# Patient Record
Sex: FEMALE | Race: BLACK OR AFRICAN AMERICAN | Employment: FULL TIME | URBAN - NONMETROPOLITAN AREA
[De-identification: names, ages, dates, MRNs, and addresses within clinical notes are randomized per-mention and may not be internally consistent; named-entity substitution may affect disease eponyms.]

---

## 2017-09-17 ENCOUNTER — HOSPITAL ENCOUNTER (EMERGENCY)
Age: 47
Discharge: HOME | End: 2017-09-17
Attending: EMERGENCY MEDICINE
Payer: MEDICARE

## 2017-09-17 VITALS
TEMPERATURE: 98.8 F | RESPIRATION RATE: 18 BRPM | BODY MASS INDEX: 25.9 KG/M2 | OXYGEN SATURATION: 99 % | HEIGHT: 67 IN | HEART RATE: 101 BPM | DIASTOLIC BLOOD PRESSURE: 93 MMHG | WEIGHT: 165 LBS | SYSTOLIC BLOOD PRESSURE: 110 MMHG

## 2017-09-17 DIAGNOSIS — M54.2 NECK PAIN: ICD-10-CM

## 2017-09-17 DIAGNOSIS — M54.32 SCIATICA OF LEFT SIDE: Primary | ICD-10-CM

## 2017-09-17 DIAGNOSIS — J01.01 ACUTE RECURRENT MAXILLARY SINUSITIS: ICD-10-CM

## 2017-09-17 PROCEDURE — 99283 EMERGENCY DEPT VISIT LOW MDM: CPT

## 2017-09-17 PROCEDURE — 96372 THER/PROPH/DIAG INJ SC/IM: CPT

## 2017-09-17 PROCEDURE — 6360000002 HC RX W HCPCS: Performed by: EMERGENCY MEDICINE

## 2017-09-17 PROCEDURE — 6370000000 HC RX 637 (ALT 250 FOR IP): Performed by: EMERGENCY MEDICINE

## 2017-09-17 RX ORDER — ORPHENADRINE CITRATE 30 MG/ML
60 INJECTION INTRAMUSCULAR; INTRAVENOUS ONCE
Status: COMPLETED | OUTPATIENT
Start: 2017-09-17 | End: 2017-09-17

## 2017-09-17 RX ORDER — PREDNISONE 50 MG/1
50 TABLET ORAL DAILY
Qty: 4 TABLET | Refills: 0 | Status: SHIPPED | OUTPATIENT
Start: 2017-09-17 | End: 2017-09-21

## 2017-09-17 RX ORDER — AMOXICILLIN AND CLAVULANATE POTASSIUM 875; 125 MG/1; MG/1
1 TABLET, FILM COATED ORAL 2 TIMES DAILY
Qty: 20 TABLET | Refills: 0 | Status: SHIPPED | OUTPATIENT
Start: 2017-09-17 | End: 2017-09-27

## 2017-09-17 RX ORDER — OXYCODONE HYDROCHLORIDE AND ACETAMINOPHEN 5; 325 MG/1; MG/1
1 TABLET ORAL ONCE
Status: COMPLETED | OUTPATIENT
Start: 2017-09-17 | End: 2017-09-17

## 2017-09-17 RX ORDER — CYCLOBENZAPRINE HCL 10 MG
10 TABLET ORAL 3 TIMES DAILY PRN
Qty: 30 TABLET | Refills: 0 | Status: SHIPPED | OUTPATIENT
Start: 2017-09-17 | End: 2017-10-07

## 2017-09-17 RX ORDER — OXYCODONE HYDROCHLORIDE AND ACETAMINOPHEN 5; 325 MG/1; MG/1
1 TABLET ORAL EVERY 6 HOURS PRN
Qty: 12 TABLET | Refills: 0 | Status: SHIPPED | OUTPATIENT
Start: 2017-09-17 | End: 2017-09-29

## 2017-09-17 RX ADMIN — ORPHENADRINE CITRATE 60 MG: 30 INJECTION INTRAMUSCULAR; INTRAVENOUS at 13:11

## 2017-09-17 RX ADMIN — PREDNISONE 50 MG: 20 TABLET ORAL at 13:08

## 2017-09-17 RX ADMIN — OXYCODONE HYDROCHLORIDE AND ACETAMINOPHEN 1 TABLET: 5; 325 TABLET ORAL at 13:07

## 2017-09-17 ASSESSMENT — PAIN SCALES - GENERAL
PAINLEVEL_OUTOF10: 6
PAINLEVEL_OUTOF10: 6

## 2017-09-17 ASSESSMENT — PAIN DESCRIPTION - ORIENTATION: ORIENTATION: LEFT

## 2017-09-17 ASSESSMENT — PAIN DESCRIPTION - PAIN TYPE: TYPE: ACUTE PAIN

## 2017-09-17 ASSESSMENT — PAIN DESCRIPTION - FREQUENCY: FREQUENCY: CONTINUOUS

## 2017-10-02 ENCOUNTER — HOSPITAL ENCOUNTER (OUTPATIENT)
Age: 47
Discharge: HOME | End: 2017-10-02
Payer: MEDICARE

## 2017-10-02 LAB
ALBUMIN SERPL-MCNC: 3.9 G/DL (ref 3.5–5.1)
ALP BLD-CCNC: 43 U/L (ref 38–126)
ALT SERPL-CCNC: 36 U/L (ref 11–66)
ANION GAP SERPL CALCULATED.3IONS-SCNC: 12 MEQ/L (ref 8–16)
ANISOCYTOSIS: ABNORMAL
AST SERPL-CCNC: 24 U/L (ref 5–40)
BASOPHILS # BLD: 1.1 %
BASOPHILS ABSOLUTE: 0 THOU/MM3 (ref 0–0.1)
BILIRUB SERPL-MCNC: 0.7 MG/DL (ref 0.3–1.2)
BUN BLDV-MCNC: 12 MG/DL (ref 7–22)
CALCIUM SERPL-MCNC: 9 MG/DL (ref 8.5–10.5)
CHLORIDE BLD-SCNC: 100 MEQ/L (ref 98–111)
CHOLESTEROL, TOTAL: 180 MG/DL (ref 100–199)
CO2: 27 MEQ/L (ref 23–33)
CREAT SERPL-MCNC: 1.1 MG/DL (ref 0.4–1.2)
EOSINOPHIL # BLD: 2 %
EOSINOPHILS ABSOLUTE: 0.1 THOU/MM3 (ref 0–0.4)
GFR SERPL CREATININE-BSD FRML MDRD: 65 ML/MIN/1.73M2
GLUCOSE BLD-MCNC: 92 MG/DL (ref 70–108)
HCT VFR BLD CALC: 38 % (ref 37–47)
HDLC SERPL-MCNC: 66 MG/DL
HEMOGLOBIN: 12.4 GM/DL (ref 12–16)
LDL CHOLESTEROL CALCULATED: 98 MG/DL
LYMPHOCYTES # BLD: 23.4 %
LYMPHOCYTES ABSOLUTE: 1 THOU/MM3 (ref 1–4.8)
MCH RBC QN AUTO: 28.3 PG (ref 27–31)
MCHC RBC AUTO-ENTMCNC: 32.7 GM/DL (ref 33–37)
MCV RBC AUTO: 86.7 FL (ref 81–99)
MONOCYTES # BLD: 11.3 %
MONOCYTES ABSOLUTE: 0.5 THOU/MM3 (ref 0.4–1.3)
NUCLEATED RED BLOOD CELLS: 0 /100 WBC
PDW BLD-RTO: 16.5 % (ref 11.5–14.5)
PLATELET # BLD: 129 THOU/MM3 (ref 130–400)
PMV BLD AUTO: 9.9 MCM (ref 7.4–10.4)
POTASSIUM SERPL-SCNC: 4.8 MEQ/L (ref 3.5–5.2)
RBC # BLD: 4.38 MILL/MM3 (ref 4.2–5.4)
RBC # BLD: NORMAL 10*6/UL
SEG NEUTROPHILS: 62.2 %
SEGMENTED NEUTROPHILS ABSOLUTE COUNT: 2.7 THOU/MM3 (ref 1.8–7.7)
SODIUM BLD-SCNC: 139 MEQ/L (ref 135–145)
TOTAL PROTEIN: 6.8 G/DL (ref 6.1–8)
TRIGL SERPL-MCNC: 80 MG/DL (ref 0–199)
TSH SERPL DL<=0.05 MIU/L-ACNC: 2.3 UIU/ML (ref 0.4–4.2)
WBC # BLD: 4.3 THOU/MM3 (ref 4.8–10.8)

## 2017-10-02 PROCEDURE — 36415 COLL VENOUS BLD VENIPUNCTURE: CPT

## 2017-10-02 PROCEDURE — 80061 LIPID PANEL: CPT

## 2017-10-02 PROCEDURE — 80050 GENERAL HEALTH PANEL: CPT

## 2017-10-05 ENCOUNTER — HOSPITAL ENCOUNTER (OUTPATIENT)
Dept: WOMENS IMAGING | Age: 47
Discharge: HOME | End: 2017-10-05
Payer: MEDICARE

## 2017-10-05 DIAGNOSIS — Z12.31 VISIT FOR SCREENING MAMMOGRAM: ICD-10-CM

## 2017-10-05 PROCEDURE — G0202 SCR MAMMO BI INCL CAD: HCPCS

## 2017-10-07 ENCOUNTER — HOSPITAL ENCOUNTER (EMERGENCY)
Age: 47
Discharge: HOME | End: 2017-10-07
Attending: EMERGENCY MEDICINE
Payer: MEDICARE

## 2017-10-07 ENCOUNTER — APPOINTMENT (OUTPATIENT)
Dept: CT IMAGING | Age: 47
End: 2017-10-07
Payer: MEDICARE

## 2017-10-07 ENCOUNTER — APPOINTMENT (OUTPATIENT)
Dept: INTERVENTIONAL RADIOLOGY/VASCULAR | Age: 47
End: 2017-10-07
Payer: MEDICARE

## 2017-10-07 ENCOUNTER — APPOINTMENT (OUTPATIENT)
Dept: GENERAL RADIOLOGY | Age: 47
End: 2017-10-07
Payer: MEDICARE

## 2017-10-07 VITALS
SYSTOLIC BLOOD PRESSURE: 178 MMHG | HEIGHT: 67 IN | TEMPERATURE: 98.4 F | BODY MASS INDEX: 30.29 KG/M2 | OXYGEN SATURATION: 100 % | RESPIRATION RATE: 17 BRPM | DIASTOLIC BLOOD PRESSURE: 112 MMHG | HEART RATE: 76 BPM | WEIGHT: 193 LBS

## 2017-10-07 DIAGNOSIS — I10 UNCONTROLLED HYPERTENSION: ICD-10-CM

## 2017-10-07 DIAGNOSIS — R07.89 ATYPICAL CHEST PAIN: Primary | ICD-10-CM

## 2017-10-07 DIAGNOSIS — I71.21 ASCENDING AORTIC ANEURYSM: ICD-10-CM

## 2017-10-07 LAB
ANION GAP SERPL CALCULATED.3IONS-SCNC: 11 MEQ/L (ref 8–16)
ANISOCYTOSIS: ABNORMAL
BACTERIA: ABNORMAL /HPF
BASOPHILS # BLD: 1 %
BASOPHILS ABSOLUTE: 0 THOU/MM3 (ref 0–0.1)
BILIRUBIN URINE: NEGATIVE
BLOOD, URINE: NEGATIVE
BUN BLDV-MCNC: 19 MG/DL (ref 7–22)
CALCIUM SERPL-MCNC: 8.9 MG/DL (ref 8.5–10.5)
CASTS 2: ABNORMAL /LPF
CASTS UA: ABNORMAL /LPF
CHARACTER, URINE: CLEAR
CHLORIDE BLD-SCNC: 100 MEQ/L (ref 98–111)
CO2: 26 MEQ/L (ref 23–33)
COLOR: YELLOW
CREAT SERPL-MCNC: 1.3 MG/DL (ref 0.4–1.2)
CRYSTALS, UA: ABNORMAL
EKG ATRIAL RATE: 83 BPM
EKG P AXIS: 34 DEGREES
EKG P-R INTERVAL: 142 MS
EKG Q-T INTERVAL: 354 MS
EKG QRS DURATION: 78 MS
EKG QTC CALCULATION (BAZETT): 415 MS
EKG R AXIS: -71 DEGREES
EKG T AXIS: 74 DEGREES
EKG VENTRICULAR RATE: 83 BPM
EOSINOPHIL # BLD: 2.3 %
EOSINOPHILS ABSOLUTE: 0.1 THOU/MM3 (ref 0–0.4)
EPITHELIAL CELLS, UA: ABNORMAL /HPF
GFR SERPL CREATININE-BSD FRML MDRD: 53 ML/MIN/1.73M2
GLUCOSE BLD-MCNC: 80 MG/DL (ref 70–108)
GLUCOSE URINE: NEGATIVE MG/DL
HCT VFR BLD CALC: 38.3 % (ref 37–47)
HEMOGLOBIN: 12.5 GM/DL (ref 12–16)
KETONES, URINE: NEGATIVE
LEUKOCYTE ESTERASE, URINE: ABNORMAL
LYMPHOCYTES # BLD: 25.1 %
LYMPHOCYTES ABSOLUTE: 0.9 THOU/MM3 (ref 1–4.8)
MCH RBC QN AUTO: 28.2 PG (ref 27–31)
MCHC RBC AUTO-ENTMCNC: 32.7 GM/DL (ref 33–37)
MCV RBC AUTO: 86.2 FL (ref 81–99)
MISCELLANEOUS 2: ABNORMAL
MONOCYTES # BLD: 15.2 %
MONOCYTES ABSOLUTE: 0.5 THOU/MM3 (ref 0.4–1.3)
NITRITE, URINE: NEGATIVE
NUCLEATED RED BLOOD CELLS: 0 /100 WBC
OSMOLALITY CALCULATION: 275.1 MOSMOL/KG (ref 275–300)
PDW BLD-RTO: 16.6 % (ref 11.5–14.5)
PH UA: 7
PLATELET # BLD: 126 THOU/MM3 (ref 130–400)
PMV BLD AUTO: 10.5 MCM (ref 7.4–10.4)
POTASSIUM SERPL-SCNC: 4.6 MEQ/L (ref 3.5–5.2)
PROTEIN UA: NEGATIVE
RBC # BLD: 4.45 MILL/MM3 (ref 4.2–5.4)
RBC # BLD: NORMAL 10*6/UL
RBC URINE: ABNORMAL /HPF
RENAL EPITHELIAL, UA: ABNORMAL
SEG NEUTROPHILS: 56.4 %
SEGMENTED NEUTROPHILS ABSOLUTE COUNT: 1.9 THOU/MM3 (ref 1.8–7.7)
SODIUM BLD-SCNC: 137 MEQ/L (ref 135–145)
SPECIFIC GRAVITY, URINE: 1.03 (ref 1–1.03)
TROPONIN T: < 0.01 NG/ML
TROPONIN T: < 0.01 NG/ML
UROBILINOGEN, URINE: 0.2 EU/DL
WBC # BLD: 3.4 THOU/MM3 (ref 4.8–10.8)
WBC UA: ABNORMAL /HPF
YEAST: ABNORMAL

## 2017-10-07 PROCEDURE — 84484 ASSAY OF TROPONIN QUANT: CPT

## 2017-10-07 PROCEDURE — 93005 ELECTROCARDIOGRAM TRACING: CPT | Performed by: PHYSICIAN ASSISTANT

## 2017-10-07 PROCEDURE — 80048 BASIC METABOLIC PNL TOTAL CA: CPT

## 2017-10-07 PROCEDURE — 93971 EXTREMITY STUDY: CPT

## 2017-10-07 PROCEDURE — 71020 XR CHEST STANDARD TWO VW: CPT

## 2017-10-07 PROCEDURE — 85025 COMPLETE CBC W/AUTO DIFF WBC: CPT

## 2017-10-07 PROCEDURE — 81001 URINALYSIS AUTO W/SCOPE: CPT

## 2017-10-07 PROCEDURE — 71275 CT ANGIOGRAPHY CHEST: CPT

## 2017-10-07 PROCEDURE — 99285 EMERGENCY DEPT VISIT HI MDM: CPT

## 2017-10-07 PROCEDURE — 2580000003 HC RX 258: Performed by: EMERGENCY MEDICINE

## 2017-10-07 PROCEDURE — 87086 URINE CULTURE/COLONY COUNT: CPT

## 2017-10-07 PROCEDURE — 6360000004 HC RX CONTRAST MEDICATION: Performed by: EMERGENCY MEDICINE

## 2017-10-07 PROCEDURE — 36415 COLL VENOUS BLD VENIPUNCTURE: CPT

## 2017-10-07 RX ORDER — PAROXETINE 10 MG/1
20 TABLET, FILM COATED ORAL NIGHTLY
Status: ON HOLD | COMMUNITY
End: 2017-11-14 | Stop reason: DRUGHIGH

## 2017-10-07 RX ORDER — AMLODIPINE BESYLATE 2.5 MG/1
2.5 TABLET ORAL DAILY
COMMUNITY
End: 2017-10-07

## 2017-10-07 RX ORDER — SODIUM CHLORIDE 9 MG/ML
INJECTION, SOLUTION INTRAVENOUS CONTINUOUS
Status: DISCONTINUED | OUTPATIENT
Start: 2017-10-07 | End: 2017-10-07 | Stop reason: HOSPADM

## 2017-10-07 RX ORDER — AMLODIPINE BESYLATE 5 MG/1
5 TABLET ORAL DAILY
Qty: 30 TABLET | Refills: 0 | Status: ON HOLD | OUTPATIENT
Start: 2017-10-07 | End: 2017-11-14 | Stop reason: ALTCHOICE

## 2017-10-07 RX ADMIN — SODIUM CHLORIDE: 9 INJECTION, SOLUTION INTRAVENOUS at 19:30

## 2017-10-07 RX ADMIN — IOPAMIDOL 80 ML: 755 INJECTION, SOLUTION INTRAVENOUS at 16:53

## 2017-10-07 ASSESSMENT — ENCOUNTER SYMPTOMS
COUGH: 0
BACK PAIN: 0
VOMITING: 0
SORE THROAT: 0
WHEEZING: 0
RHINORRHEA: 0
ABDOMINAL PAIN: 0
DIARRHEA: 0
NAUSEA: 0
SHORTNESS OF BREATH: 1

## 2017-10-07 ASSESSMENT — PAIN DESCRIPTION - LOCATION: LOCATION: CHEST

## 2017-10-07 ASSESSMENT — PAIN DESCRIPTION - DESCRIPTORS: DESCRIPTORS: SQUEEZING

## 2017-10-07 ASSESSMENT — PAIN DESCRIPTION - FREQUENCY: FREQUENCY: CONTINUOUS

## 2017-10-07 ASSESSMENT — PAIN SCALES - GENERAL: PAINLEVEL_OUTOF10: 7

## 2017-10-07 ASSESSMENT — HEART SCORE: ECG: 0

## 2017-10-07 ASSESSMENT — PAIN DESCRIPTION - PAIN TYPE: TYPE: ACUTE PAIN

## 2017-10-07 NOTE — ED PROVIDER NOTES
[address] [address] [address]   eMERGENCY dEPARTMENT eNCOUnter        [address] [facility]    Chief Complaint   Patient presents with    Chest Pain    Shortness of Breath       Nurses Notes reviewed and I agree except as noted in the HPI. HPI    Soren Farrar is a 56 y.o. female who presents for evaluation of chest pain and shortness of breath. The patient states that earlier today she developed left sided chest pain and shortness of breath that began yesterday. She describes her chest pain as \"squeezing\" with occasional sharp pain that she currently rates as a 7/10. She states she is only able to walk approximately 10 feet before feeling short of breath. She states yesterday she also developed diaphoresis, fatigue, and decreased activity. She states for the past 3-4 nights she has been having increased frequency. She states she also has left calf tenderness. Additionally, she states she has a MediPort on her left upper chest pain that she had implanted 4 years ago that hasn't been removed since then. The use of Mediport for antibiotics as she had a bad case of mastoiditis. She states she smokes a pack of cigarettes a day. She denies having nausea, emesis, diarrhea, headache, fever, or any other symptoms. REVIEW OF SYSTEMS    Review of Systems   Constitutional: Positive for activity change (decrease), diaphoresis and fatigue. Negative for appetite change, chills and fever. HENT: Negative for congestion, ear pain, rhinorrhea and sore throat. Respiratory: Positive for shortness of breath. Negative for cough and wheezing. Cardiovascular: Positive for chest pain. Negative for palpitations and leg swelling. Gastrointestinal: Negative for abdominal pain, diarrhea, nausea and vomiting. Genitourinary: Positive for frequency. Negative for difficulty urinating, dysuria, hematuria, urgency, vaginal bleeding, vaginal discharge and vaginal pain.    Musculoskeletal: Positive for myalgias (left calf). Negative for arthralgias, back pain, joint swelling and neck pain. Skin: Negative for pallor and rash. Neurological: Negative for dizziness, syncope, weakness, light-headedness and headaches. Hematological: Negative for adenopathy. PAST MEDICAL HISTORY     has a past medical history of Eczema; Hypertension; and Thyroid disease. SURGICAL HISTORY     has a past surgical history that includes  section; salpingectomy; Tubal ligation; Tunneled venous port placement (Left); and Thyroidectomy, partial.    CURRENT MEDICATIONS    Previous Medications    AMLODIPINE (NORVASC) 2.5 MG TABLET    Take 2.5 mg by mouth daily    BUPROPION HCL (WELLBUTRIN PO)    Take 1 tablet by mouth 2 times daily    PAROXETINE (PAXIL) 10 MG TABLET    Take 20 mg by mouth nightly       ALLERGIES    is allergic to fish-derived products. FAMILY HISTORY    indicated that the status of her mother is unknown. She indicated that the status of her maternal grandfather is unknown. She indicated that the status of her paternal grandfather is unknown.    family history includes Cancer in her maternal grandfather and mother; Diabetes in her paternal grandfather; Heart Disease in her paternal grandfather. SOCIAL HISTORY     reports that she has been smoking Cigarettes. She has a 20.00 pack-year smoking history. She has never used smokeless tobacco. She reports that she drinks alcohol. She reports that she does not use drugs. PHYSICAL EXAM      INITIAL VITALS: BP (!) 178/112   Pulse 76   Temp 98.4 °F (36.9 °C) (Oral)   Resp 17   Ht 5' 7\" (1.702 m)   Wt 193 lb (87.5 kg)   LMP 2017   SpO2 100%   BMI 30.23 kg/m²  Estimated body mass index is 30.23 kg/m² as calculated from the following:    Height as of this encounter: 5' 7\" (1.702 m). Weight as of this encounter: 193 lb (87.5 kg). Physical Exam   Constitutional: She is oriented to person, place, and time.  She appears well-developed and

## 2017-10-07 NOTE — ED NOTES
Labs obtained with IV start. Sent to lab a this time.       [id number] [facility]  10/07/17 2167

## 2017-10-07 NOTE — ED TRIAGE NOTES
Patient to room 29 with c/o substernal chest pain radiating under left breast and shortness of breath that started yesterday. Lung sounds clear throughout. Respirations easy and unlabored at this time. Abdomin soft and non-tender. Bowel sounds active all quadrants. No edema noted. Skin warm and dry. Monitor applied. Assessment complete.

## 2017-10-09 LAB
ORGANISM: ABNORMAL
URINE CULTURE REFLEX: ABNORMAL

## 2017-10-10 ENCOUNTER — OFFICE VISIT (OUTPATIENT)
Dept: CARDIOLOGY CLINIC | Age: 47
End: 2017-10-10
Payer: MEDICARE

## 2017-10-10 VITALS
WEIGHT: 191.3 LBS | SYSTOLIC BLOOD PRESSURE: 132 MMHG | DIASTOLIC BLOOD PRESSURE: 82 MMHG | BODY MASS INDEX: 30.02 KG/M2 | HEART RATE: 88 BPM | HEIGHT: 67 IN

## 2017-10-10 DIAGNOSIS — Z72.0 TOBACCO ABUSE: ICD-10-CM

## 2017-10-10 DIAGNOSIS — R07.9 CHEST PAIN, UNSPECIFIED TYPE: Primary | ICD-10-CM

## 2017-10-10 DIAGNOSIS — I71.9 AORTIC ANEURYSM WITHOUT RUPTURE, UNSPECIFIED PORTION OF AORTA (HCC): ICD-10-CM

## 2017-10-10 PROCEDURE — 99203 OFFICE O/P NEW LOW 30 MIN: CPT | Performed by: INTERNAL MEDICINE

## 2017-10-10 RX ORDER — ATORVASTATIN CALCIUM 40 MG/1
20 TABLET, FILM COATED ORAL DAILY
Qty: 30 TABLET | Refills: 3 | Status: ON HOLD | OUTPATIENT
Start: 2017-10-10 | End: 2017-11-14 | Stop reason: DRUGHIGH

## 2017-10-10 RX ORDER — ISOSORBIDE MONONITRATE 30 MG/1
30 TABLET, EXTENDED RELEASE ORAL DAILY
Qty: 30 TABLET | Refills: 3 | Status: ON HOLD | OUTPATIENT
Start: 2017-10-10 | End: 2017-11-14 | Stop reason: HOSPADM

## 2017-10-10 RX ORDER — ASPIRIN 81 MG/1
81 TABLET ORAL DAILY
Qty: 30 TABLET | Refills: 3 | Status: SHIPPED | OUTPATIENT
Start: 2017-10-10 | End: 2018-03-03 | Stop reason: SDUPTHER

## 2017-10-10 ASSESSMENT — ENCOUNTER SYMPTOMS
BLOATING: 0
EYE PAIN: 0
DIARRHEA: 0
DOUBLE VISION: 0
HEMOPTYSIS: 0
COUGH: 0
ABDOMINAL PAIN: 0
SHORTNESS OF BREATH: 1
SPUTUM PRODUCTION: 0
HOARSE VOICE: 0
CHANGE IN BOWEL HABIT: 0
ORTHOPNEA: 0
BOWEL INCONTINENCE: 0
EYE DISCHARGE: 0
CONSTIPATION: 0
BACK PAIN: 0
BLURRED VISION: 0

## 2017-10-10 NOTE — PROGRESS NOTES
Positive for malaise/fatigue. Negative for decreased appetite, diaphoresis, fever and weakness. HENT: Negative for congestion, hearing loss and hoarse voice. Eyes: Negative for blurred vision, discharge, double vision and pain. Cardiovascular: Positive for chest pain and dyspnea on exertion. Negative for claudication, cyanosis, irregular heartbeat, leg swelling, near-syncope, orthopnea, palpitations, paroxysmal nocturnal dyspnea and syncope. Respiratory: Positive for shortness of breath. Negative for cough, hemoptysis and sputum production. Endocrine: Negative for cold intolerance, heat intolerance, polydipsia, polyphagia and polyuria. Musculoskeletal: Negative for arthritis, back pain, falls, gout, joint pain and joint swelling. Gastrointestinal: Negative for bloating, abdominal pain, anorexia, change in bowel habit, bowel incontinence, constipation and diarrhea. Genitourinary: Negative for bladder incontinence, dysuria, flank pain, frequency and hematuria. Neurological: Negative for difficulty with concentration, disturbances in coordination, focal weakness, headaches and numbness. Psychiatric/Behavioral: Negative for altered mental status and depression. Objective:     /82   Pulse 88   Ht 5' 7\" (1.702 m)   Wt 191 lb 4.8 oz (86.8 kg)   LMP 09/22/2017   BMI 29.96 kg/m²     Wt Readings from Last 3 Encounters:   10/10/17 191 lb 4.8 oz (86.8 kg)   10/07/17 193 lb (87.5 kg)   09/17/17 165 lb (74.8 kg)     BP Readings from Last 3 Encounters:   10/10/17 132/82   10/07/17 (!) 178/112   09/17/17 (!) 110/93       Physical Exam   Constitutional: She is oriented to person, place, and time. She appears well-developed and well-nourished. HENT:   Head: Normocephalic and atraumatic. Eyes: EOM are normal. Pupils are equal, round, and reactive to light. Neck: Normal range of motion. Neck supple. No JVD present.    Cardiovascular: Normal rate, regular rhythm, normal heart sounds and intact [phone]    Transthoracic Echocardiogram  2D, M-mode, Doppler, and Color Doppler    Name: Che Doe  : [id number]  MR #: [id number]  Study date: 15-Dec-2012  Account #: [de-identified]  Ht-Wt-BSA: 79 in- 197.6 lb- 2.01 mÂ²    Reading Physician:  Mario Ramachandran MD  Technologist:  Dwayne Monsivais, [facility]  Referring Physician:  Yoni Webster. RICHARD López  Escobar Pancake for study: eval LV function, dyspnea, HTN    HISTORY: PRIOR HISTORY: SOB, HTN, smoker  /PROCEDURE: The procedure was performed at the bedside. The procedure was  color Doppler. Systolic blood pressure was 117 mmHg, at the start of the study. Diastolic blood pressure was 71 mmHg, at the start of the study. Image quality  was adequate. LEFT VENTRICLE: Size was normal. Systolic function was normal. Ejection  fraction was estimated to be 60 %. There were no regional wall motion  abnormalities. Wall thickness was normal. DOPPLER: Left ventricular diastolic  function parameters were normal.    AORTIC VALVE: The valve was trileaflet. Leaflets exhibited normal thickness and  normal cuspal separation. DOPPLER: Transaortic velocity was within the normal  range. There was no evidence for stenosis. There was no regurgitation. AORTA: The root exhibited normal size. MITRAL VALVE: Valve structure was normal. There was normal leaflet separation. DOPPLER: The transmitral velocity was within the normal range. There was no  evidence for stenosis. There was mild regurgitation. LEFT ATRIUM: Size was normal.    RIGHT VENTRICLE: The size was normal. Systolic function was normal. Wall  thickness was normal.    PULMONIC VALVE: Not well visualized. DOPPLER: The transpulmonic velocity was  within the normal range. There was no regurgitation. PULMONARY ARTERY: The size was normal. DOPPLER: Systolic pressure was within  the normal range. TRICUSPID VALVE: The valve structure was normal. There was normal leaflet  separation.  DOPPLER: The transtricuspid velocity was within the normal range. There was no evidence for stenosis. There was mild regurgitation. RIGHT ATRIUM: Size was normal.    SYSTEMIC VEINS: IVC: The inferior vena cava was normal in size. PERICARDIUM: There was no pericardial effusion. The pericardium was normal in  appearance. SYSTEM MEASUREMENT TABLES    2D mode  LA Dimension (2D): 3.3 cm  FS (2D-Cubed): 31 %  FS (2D-Teich): 31 %  IVSd (2D): 1 cm  IVSd; Mean chosen (2D): 1 cm  LVIDd (2D): 3.6 cm  LVIDs (2D): 2.5 cm  LVOT Area (2D): 3.1 cm2  LVPWd (2D): 1 cm  RVIDd (2D): 3 cm    M mode  AoR Diam (MM): 2.9 cm  AV Cusp Sep (MM): 2 cm  MV D-E Exc: 1.7 cm  MV EF Walsh (MM): 9.1 cm/s    Unspecified Scan Mode  VTI; Antegrade Flow: 29.4 cm  Vmax; Antegrade Flow: 1.5 m/s  LVOT Diam: 2 cm  LVOT Vmax: 90.2 cm/s  MV Peak A William; Mean: 52.8 cm/s  MV Peak E William; Antegrade Flow: 82.9 cm/s  Vmax; Antegrade Flow: 82.4 cm/s  TV Peak A William: 52.3 cm/s  TV Peak E William; Antegrade Flow: 71.1 cm/s    SUMMARY:    Left ventricle:  Size was normal.  Systolic function was normal. Ejection fraction was estimated to be 60 %. There were no regional wall motion abnormalities. Mitral valve: There was mild regurgitation. Tricuspid valve: There was mild regurgitation. Prepared and signed by    Sharyle Him, MD  Signed 15-Dec-2012 11:34:16         Assessment/Plan      1. Chest pain, unspecified type     2. Tobacco abuse     3. Aortic aneurysm without rupture, unspecified portion of aorta (HCC)     Underwent evaluation in ED  CT-Chest shows Ascending aortic anuersym of 4.2  Will repeat another CT-Chest in 6 month to see the rate of growth  Patient has a port in the left chest which was apparently placed for IV antibotics for mastoiditis  She reports she neglected to follow up to have it removed.   She has another appointment with Dr. Bobbi Nelson to have it evaluated for removal.  Patient uses cocaine, last use was one month ago, discussed about quitting the habit  EKG shows inferior infarct and poor R wave progression. Will get echo and stress test to evaluate for ischemia  Recently added amlodipine   Will add aspirin, Imdur, statin  Spoke to her about smoking and the dangers of tobacco and the importance of trying to stop smoking. We talked about ways to try to stop such as just stopping \"cold turkey\" (which is usually hard to do and not very successful) nicotine replacement options like the patch and gum and the electronic cigarette. We also talked about Chantix. Total time spent in tobacco counseling was between 3 to 5 minutes. Return in about 4 weeks (around 11/7/2017) for Review testing.            Electronically signed by Delon Nina MD [facility]  10/10/2017 at 11:07 AM

## 2017-10-14 ENCOUNTER — HOSPITAL ENCOUNTER (EMERGENCY)
Age: 47
Discharge: HOME | End: 2017-10-15
Attending: EMERGENCY MEDICINE
Payer: MEDICARE

## 2017-10-14 ENCOUNTER — APPOINTMENT (OUTPATIENT)
Dept: CT IMAGING | Age: 47
End: 2017-10-14
Payer: MEDICARE

## 2017-10-14 DIAGNOSIS — G44.209 ACUTE NON INTRACTABLE TENSION-TYPE HEADACHE: Primary | ICD-10-CM

## 2017-10-14 LAB
ALBUMIN SERPL-MCNC: 4.2 G/DL (ref 3.5–5.1)
ALP BLD-CCNC: 43 U/L (ref 38–126)
ALT SERPL-CCNC: 10 U/L (ref 11–66)
ANION GAP SERPL CALCULATED.3IONS-SCNC: 14 MEQ/L (ref 8–16)
ANISOCYTOSIS: ABNORMAL
APTT: 27.3 SECONDS (ref 22–38)
AST SERPL-CCNC: 13 U/L (ref 5–40)
BASOPHILS # BLD: 1.1 %
BASOPHILS ABSOLUTE: 0 THOU/MM3 (ref 0–0.1)
BILIRUB SERPL-MCNC: 0.2 MG/DL (ref 0.3–1.2)
BUN BLDV-MCNC: 17 MG/DL (ref 7–22)
C-REACTIVE PROTEIN: 0.06 MG/DL (ref 0–1)
CALCIUM SERPL-MCNC: 10 MG/DL (ref 8.5–10.5)
CHLORIDE BLD-SCNC: 98 MEQ/L (ref 98–111)
CO2: 25 MEQ/L (ref 23–33)
CREAT SERPL-MCNC: 1.3 MG/DL (ref 0.4–1.2)
EOSINOPHIL # BLD: 2.2 %
EOSINOPHILS ABSOLUTE: 0.1 THOU/MM3 (ref 0–0.4)
GFR SERPL CREATININE-BSD FRML MDRD: 53 ML/MIN/1.73M2
GLUCOSE BLD-MCNC: 86 MG/DL (ref 70–108)
HCT VFR BLD CALC: 38.5 % (ref 37–47)
HEMOGLOBIN: 12.5 GM/DL (ref 12–16)
INR BLD: 0.88 (ref 0.85–1.13)
LYMPHOCYTES # BLD: 30.8 %
LYMPHOCYTES ABSOLUTE: 1.4 THOU/MM3 (ref 1–4.8)
MCH RBC QN AUTO: 28.2 PG (ref 27–31)
MCHC RBC AUTO-ENTMCNC: 32.5 GM/DL (ref 33–37)
MCV RBC AUTO: 86.8 FL (ref 81–99)
MONOCYTES # BLD: 12.8 %
MONOCYTES ABSOLUTE: 0.6 THOU/MM3 (ref 0.4–1.3)
NUCLEATED RED BLOOD CELLS: 0 /100 WBC
OSMOLALITY CALCULATION: 274.7 MOSMOL/KG (ref 275–300)
PDW BLD-RTO: 16.9 % (ref 11.5–14.5)
PLATELET # BLD: 152 THOU/MM3 (ref 130–400)
PMV BLD AUTO: 10.5 MCM (ref 7.4–10.4)
POTASSIUM SERPL-SCNC: 4.4 MEQ/L (ref 3.5–5.2)
PRO-BNP: 71.6 PG/ML (ref 0–450)
PROCALCITONIN: 0.04 NG/ML (ref 0.01–0.09)
PROLACTIN: 24.2 NG/ML
RBC # BLD: 4.43 MILL/MM3 (ref 4.2–5.4)
RBC # BLD: NORMAL 10*6/UL
SEDIMENTATION RATE, ERYTHROCYTE: 13 MM/HR (ref 0–20)
SEG NEUTROPHILS: 53.1 %
SEGMENTED NEUTROPHILS ABSOLUTE COUNT: 2.4 THOU/MM3 (ref 1.8–7.7)
SODIUM BLD-SCNC: 137 MEQ/L (ref 135–145)
TOTAL PROTEIN: 7.4 G/DL (ref 6.1–8)
TROPONIN T: < 0.01 NG/ML
WBC # BLD: 4.5 THOU/MM3 (ref 4.8–10.8)

## 2017-10-14 PROCEDURE — 96375 TX/PRO/DX INJ NEW DRUG ADDON: CPT

## 2017-10-14 PROCEDURE — 84146 ASSAY OF PROLACTIN: CPT

## 2017-10-14 PROCEDURE — 70450 CT HEAD/BRAIN W/O DYE: CPT

## 2017-10-14 PROCEDURE — 86140 C-REACTIVE PROTEIN: CPT

## 2017-10-14 PROCEDURE — 85651 RBC SED RATE NONAUTOMATED: CPT

## 2017-10-14 PROCEDURE — 85730 THROMBOPLASTIN TIME PARTIAL: CPT

## 2017-10-14 PROCEDURE — 36415 COLL VENOUS BLD VENIPUNCTURE: CPT

## 2017-10-14 PROCEDURE — 84145 PROCALCITONIN (PCT): CPT

## 2017-10-14 PROCEDURE — 84484 ASSAY OF TROPONIN QUANT: CPT

## 2017-10-14 PROCEDURE — 6360000002 HC RX W HCPCS: Performed by: EMERGENCY MEDICINE

## 2017-10-14 PROCEDURE — 2580000003 HC RX 258: Performed by: EMERGENCY MEDICINE

## 2017-10-14 PROCEDURE — 80053 COMPREHEN METABOLIC PANEL: CPT

## 2017-10-14 PROCEDURE — 85610 PROTHROMBIN TIME: CPT

## 2017-10-14 PROCEDURE — 99283 EMERGENCY DEPT VISIT LOW MDM: CPT

## 2017-10-14 PROCEDURE — 96361 HYDRATE IV INFUSION ADD-ON: CPT

## 2017-10-14 PROCEDURE — 83880 ASSAY OF NATRIURETIC PEPTIDE: CPT

## 2017-10-14 PROCEDURE — 96374 THER/PROPH/DIAG INJ IV PUSH: CPT

## 2017-10-14 PROCEDURE — 70486 CT MAXILLOFACIAL W/O DYE: CPT

## 2017-10-14 PROCEDURE — 85025 COMPLETE CBC W/AUTO DIFF WBC: CPT

## 2017-10-14 RX ORDER — DIPHENHYDRAMINE HYDROCHLORIDE 50 MG/ML
25 INJECTION INTRAMUSCULAR; INTRAVENOUS ONCE
Status: COMPLETED | OUTPATIENT
Start: 2017-10-14 | End: 2017-10-14

## 2017-10-14 RX ORDER — 0.9 % SODIUM CHLORIDE 0.9 %
500 INTRAVENOUS SOLUTION INTRAVENOUS ONCE
Status: COMPLETED | OUTPATIENT
Start: 2017-10-14 | End: 2017-10-15

## 2017-10-14 RX ADMIN — PROCHLORPERAZINE EDISYLATE 10 MG: 5 INJECTION INTRAMUSCULAR; INTRAVENOUS at 22:31

## 2017-10-14 RX ADMIN — SODIUM CHLORIDE 500 ML: 9 INJECTION, SOLUTION INTRAVENOUS at 22:34

## 2017-10-14 RX ADMIN — DIPHENHYDRAMINE HYDROCHLORIDE 25 MG: 50 INJECTION, SOLUTION INTRAMUSCULAR; INTRAVENOUS at 22:31

## 2017-10-14 ASSESSMENT — ENCOUNTER SYMPTOMS
BACK PAIN: 0
EYE DISCHARGE: 0
ABDOMINAL PAIN: 0
SORE THROAT: 0
DIARRHEA: 0
RHINORRHEA: 0
NAUSEA: 0
SHORTNESS OF BREATH: 0
COUGH: 0
VOMITING: 0
WHEEZING: 0
PHOTOPHOBIA: 0
EYE PAIN: 0

## 2017-10-14 ASSESSMENT — PAIN SCALES - GENERAL: PAINLEVEL_OUTOF10: 10

## 2017-10-14 ASSESSMENT — PAIN DESCRIPTION - DESCRIPTORS: DESCRIPTORS: ACHING

## 2017-10-14 ASSESSMENT — PAIN DESCRIPTION - LOCATION: LOCATION: HEAD

## 2017-10-14 ASSESSMENT — PAIN DESCRIPTION - PAIN TYPE: TYPE: ACUTE PAIN

## 2017-10-15 VITALS
WEIGHT: 195 LBS | HEART RATE: 71 BPM | SYSTOLIC BLOOD PRESSURE: 148 MMHG | OXYGEN SATURATION: 98 % | HEIGHT: 67 IN | BODY MASS INDEX: 30.61 KG/M2 | DIASTOLIC BLOOD PRESSURE: 100 MMHG | RESPIRATION RATE: 14 BRPM | TEMPERATURE: 97.5 F

## 2017-10-15 NOTE — ED NOTES
Upon first contact with pstient, patient resting in bed with eyes closed. Patient arrouses to voice. VS as noted. Patient updated on POC. Lights off for comfort. Patient updated on POC.  WIll continue to monitor     Dave Suggs RN  10/14/17 0693

## 2017-10-15 NOTE — ED PROVIDER NOTES
[facility]  eMERGENCY dEPARTMENT eNCOUnter          CHIEF COMPLAINT       Chief Complaint   Patient presents with    Headache     severe       Nurses Notes reviewed and I agree except as noted in the HPI. HISTORY OF PRESENT ILLNESS    Bridger Manzo is a 56 y.o. female who presents to the Emergency Department for the evaluation of a headache that has been ongoing for 2 weeks. Patient states that she was seen in the ED on 9/17/17 for her headache, and that she was prescribed Augmentin for Sinusitis at that time. She states that she completed the prescription, but that the headache persisted. She rates her pain at a 10/10 in severity, and states that the pain is continuous in duration. She states that the pain is sharp in quality, and that it radiates from her face into her left ear and neck. Patient states that she is also experiencing intermittent blurred vision. She denies that the has experienced any head injury or trauma, photophobia, nausea, or vomiting. She reports that she has a history of hypertension, anxiety, and mastoiditis which she was diagnosed with roughly 4 years ago by Dr. Gabriella Garcia. She states that she was diagnosed with an ascending aortic aneurysm within the ED on 10/7/17, and that she has a family history of aneurysm. Patient states that she has a history of smoking. Patient denies further complaints at initial encounter. REVIEW OF SYSTEMS     Review of Systems   Constitutional: Negative for appetite change, chills, fatigue and fever. HENT: Positive for ear pain (left-sided). Negative for congestion, rhinorrhea and sore throat. Eyes: Positive for visual disturbance (blurry vision). Negative for photophobia, pain and discharge. Respiratory: Negative for cough, shortness of breath and wheezing. Cardiovascular: Negative for chest pain, palpitations and leg swelling.    Gastrointestinal: Negative for abdominal pain, diarrhea, nausea and following:     Osmolality Calc 274.7 (*)     All other components within normal limits   C-REACTIVE PROTEIN   SEDIMENTATION RATE   PROLACTIN   PROCALCITONIN   TROPONIN   BRAIN NATRIURETIC PEPTIDE   PROTIME-INR   APTT   ANION GAP       EMERGENCY DEPARTMENT COURSE:   Vitals:    Vitals:    10/14/17 2203 10/14/17 2324 10/15/17 0010   BP: (!) 161/115 132/89 (!) 148/100   Pulse: 96 75 71   Resp: 22 16 14   Temp: 97.5 °F (36.4 °C)     TempSrc: Oral     SpO2: 100% 100% 98%   Weight: 195 lb (88.5 kg)     Height: 5' 7\" (1.702 m)         9:59 PM     Patient was seen and evaluated in a timely fashion. Normal saline boluses given. Patient is given the abortive treatment including Compazine 10 g IV and Benadryl 25 mg IV. 12:45 AM    Labs are reviewed which are reassuring. CT brain and CT face have no acute findings. Headache improved significantly on reevaluation. No suspicion patient has acute intracranial pathologies. Discharged with diclofenac prescribed. Given her history of thoracic aneurysm hypertension, she is not a candidate to take Triptans. CRITICAL CARE:   None    CONSULTS:  None    PROCEDURES:  None    FINAL IMPRESSION      1.  Acute non intractable tension-type headache          DISPOSITION/PLAN   Home    PATIENT REFERRED TO:  [address]OV[address] [phone]    In 1 week        DISCHARGE MEDICATIONS:  New Prescriptions    DICLOFENAC (VOLTAREN) 50 MG EC TABLET    Take 1 tablet by mouth 2 times daily as needed for Pain (Headache)       (Please note that portions of this note were completed with a voice recognition program.  Efforts were made to edit the dictations but occasionally words are mis-transcribed.)    Scribe:  Jono Sandoval, 10/14/17 12:56 AM Scribing for and in the presence of Chilo Garcia MD.     Signed by: Logan Spencer, 10/14/17 12:56 AM     Provider:  I personally performed the services described in the documentation, reviewed and edited the documentation which was dictated to the scribe in my presence, and it accurately records my words and actions.     MD Shyann Brooks MD  10/15/17 0137

## 2017-10-15 NOTE — ED NOTES
Patient resting in bed at this time with eyes closed. Even and unlabored respirations observed. VS as noted.  WIll continue to monitor     Durga Haddad RN  10/15/17 [id number]

## 2017-10-18 ENCOUNTER — OFFICE VISIT (OUTPATIENT)
Dept: SURGERY | Age: 47
End: 2017-10-18
Payer: MEDICARE

## 2017-10-18 VITALS
DIASTOLIC BLOOD PRESSURE: 94 MMHG | RESPIRATION RATE: 20 BRPM | OXYGEN SATURATION: 97 % | HEART RATE: 104 BPM | HEIGHT: 67 IN | TEMPERATURE: 97.4 F | WEIGHT: 200.3 LBS | BODY MASS INDEX: 31.44 KG/M2 | SYSTOLIC BLOOD PRESSURE: 148 MMHG

## 2017-10-18 DIAGNOSIS — Z95.828 PORT CATHETER IN PLACE: Primary | ICD-10-CM

## 2017-10-18 PROCEDURE — 99202 OFFICE O/P NEW SF 15 MIN: CPT | Performed by: SURGERY

## 2017-10-18 ASSESSMENT — ENCOUNTER SYMPTOMS
BLOOD IN STOOL: 0
COUGH: 0
SHORTNESS OF BREATH: 0
VOMITING: 0
EYE PAIN: 0
SINUS PRESSURE: 0
ANAL BLEEDING: 0
CONSTIPATION: 0
EYE DISCHARGE: 0
COLOR CHANGE: 0
EYE ITCHING: 0
STRIDOR: 0
ABDOMINAL DISTENTION: 0
ABDOMINAL PAIN: 0
CHOKING: 0
WHEEZING: 0
RECTAL PAIN: 0
RHINORRHEA: 0
TROUBLE SWALLOWING: 0
EYE REDNESS: 0
DIARRHEA: 0
VOICE CHANGE: 0
NAUSEA: 0
PHOTOPHOBIA: 0
BACK PAIN: 0
CHEST TIGHTNESS: 0
APNEA: 0
SORE THROAT: 0
FACIAL SWELLING: 0

## 2017-10-18 NOTE — PROGRESS NOTES
Subjective:      Patient ID: Erin Lawson is a 56 y.o. female. Chief Complaint   Patient presents with    Surgical Consult     New patient--seen in the ER 10/7/17-Mediport removal-orginally placed by Dr Juju Bazan Is a 78-year-old female who presents for initial evaluation of a left subclavian vein single-lumen port insertion about 4 years ago secondary to mastoiditis. Patient states they were not able to place a PIC line. She states she has not used the port since then. She recently was seen in the emergency department and was told that if the port was not in use she should have it removed. She denies having any more issues with mastoiditis. No arm swelling. No pain no neck swelling. No chest pain. Insertion site well-healed. No drainage. No keloid. No history of infection. She states she has not flushed it since she had to use it for years ago. Denies any abdominal pain. No unexplained weight loss. No fever, chills or sweats. She is hoping to have the port removed if at all possible. Review of Systems   Constitutional: Negative for activity change, appetite change, chills, diaphoresis, fatigue, fever and unexpected weight change. HENT: Negative for congestion, dental problem, drooling, ear discharge, ear pain, facial swelling, hearing loss, mouth sores, nosebleeds, postnasal drip, rhinorrhea, sinus pressure, sneezing, sore throat, tinnitus, trouble swallowing and voice change. Eyes: Negative for photophobia, pain, discharge, redness, itching and visual disturbance. Respiratory: Negative for apnea, cough, choking, chest tightness, shortness of breath, wheezing and stridor. Cardiovascular: Negative for chest pain, palpitations and leg swelling. Gastrointestinal: Negative for abdominal distention, abdominal pain, anal bleeding, blood in stool, constipation, diarrhea, nausea, rectal pain and vomiting.    Endocrine: Negative for cold intolerance, heat intolerance, and mucous membranes are normal. No oropharyngeal exudate. Eyes: Conjunctivae and EOM are normal. Pupils are equal, round, and reactive to light. Right eye exhibits no discharge. Left eye exhibits no discharge. No scleral icterus. Neck: Trachea normal, normal range of motion, full passive range of motion without pain and phonation normal. Neck supple. No JVD present. No tracheal tenderness present. No tracheal deviation present. No thyroid mass and no thyromegaly present. Cardiovascular: Normal rate, regular rhythm, S1 normal, S2 normal, normal heart sounds, intact distal pulses and normal pulses. Exam reveals no gallop. No murmur heard. Pulmonary/Chest: Effort normal and breath sounds normal. No stridor. No respiratory distress. She has no decreased breath sounds. She has no wheezes. She has no rales. She exhibits no tenderness and no deformity. Abdominal: Soft. Bowel sounds are normal. She exhibits no distension, no fluid wave, no abdominal bruit, no pulsatile midline mass and no mass. There is no hepatosplenomegaly. There is no tenderness. There is no rigidity, no rebound, no guarding and no CVA tenderness. No hernia. Hernia confirmed negative in the ventral area, confirmed negative in the right inguinal area and confirmed negative in the left inguinal area. Musculoskeletal: Normal range of motion. She exhibits no edema or tenderness. Lymphadenopathy:     She has no cervical adenopathy. She has no axillary adenopathy. Right: No inguinal and no supraclavicular adenopathy present. Left: No inguinal and no supraclavicular adenopathy present. Neurological: She is alert and oriented to person, place, and time. She has normal strength. No cranial nerve deficit or sensory deficit. Gait normal.   Skin: Skin is warm and dry. No abrasion, no bruising, no burn, no laceration and no rash noted. She is not diaphoretic. No cyanosis or erythema. No pallor. Nails show no clubbing.

## 2017-10-20 ENCOUNTER — HOSPITAL ENCOUNTER (OUTPATIENT)
Dept: NON INVASIVE DIAGNOSTICS | Age: 47
Discharge: HOME | End: 2017-10-20
Payer: MEDICARE

## 2017-10-20 DIAGNOSIS — R07.9 CHEST PAIN, UNSPECIFIED TYPE: ICD-10-CM

## 2017-10-20 LAB
LV EF: 57 %
LVEF MODALITY: NORMAL

## 2017-10-20 PROCEDURE — A9500 TC99M SESTAMIBI: HCPCS | Performed by: INTERNAL MEDICINE

## 2017-10-20 PROCEDURE — 6360000002 HC RX W HCPCS

## 2017-10-20 PROCEDURE — 78452 HT MUSCLE IMAGE SPECT MULT: CPT

## 2017-10-20 PROCEDURE — 3430000000 HC RX DIAGNOSTIC RADIOPHARMACEUTICAL: Performed by: INTERNAL MEDICINE

## 2017-10-20 PROCEDURE — 93017 CV STRESS TEST TRACING ONLY: CPT | Performed by: INTERNAL MEDICINE

## 2017-10-20 PROCEDURE — 93306 TTE W/DOPPLER COMPLETE: CPT

## 2017-10-20 RX ADMIN — Medication 8.9 MILLICURIE: at 09:25

## 2017-10-20 RX ADMIN — Medication 33.2 MILLICURIE: at 10:25

## 2017-10-25 ENCOUNTER — TELEPHONE (OUTPATIENT)
Dept: CARDIOLOGY CLINIC | Age: 47
End: 2017-10-25

## 2017-11-01 ENCOUNTER — PROCEDURE VISIT (OUTPATIENT)
Dept: SURGERY | Age: 47
End: 2017-11-01
Payer: MEDICARE

## 2017-11-01 VITALS
WEIGHT: 200 LBS | HEART RATE: 110 BPM | SYSTOLIC BLOOD PRESSURE: 130 MMHG | RESPIRATION RATE: 16 BRPM | OXYGEN SATURATION: 99 % | DIASTOLIC BLOOD PRESSURE: 84 MMHG | HEIGHT: 67 IN | TEMPERATURE: 97.7 F | BODY MASS INDEX: 31.39 KG/M2

## 2017-11-01 DIAGNOSIS — Z95.828 PORT CATHETER IN PLACE: Primary | ICD-10-CM

## 2017-11-01 PROCEDURE — 36590 REMOVAL TUNNELED CV CATH: CPT | Performed by: SURGERY

## 2017-11-01 RX ORDER — HYDROCODONE BITARTRATE AND ACETAMINOPHEN 5; 325 MG/1; MG/1
1 TABLET ORAL EVERY 8 HOURS PRN
Qty: 15 TABLET | Refills: 0 | Status: ON HOLD | OUTPATIENT
Start: 2017-11-01 | End: 2017-11-14 | Stop reason: ALTCHOICE

## 2017-11-01 NOTE — PROGRESS NOTES
Subjective:      Patient ID: Chitra Dejesus is a 56 y.o. female. Chief Complaint   Patient presents with    Procedure     Left subclavian port removal     HPI  Ally Norris Is a 60-year-old female who presents for follow-up in office to have left subclavian vein single-lumen tunneled port removed. She no longer is in need of it. She required this to be placed due to previous mastoiditis requiring long-term IV antibiotics. She denies any current chest pain or shortness of breath. No unexplained weight loss. Tolerating diet. No fever, chills or sweats. She is ready to have the port removed today. Review of Systems   Constitutional: Negative for activity change, appetite change, chills, diaphoresis, fatigue, fever and unexpected weight change. HENT: Negative for congestion, ear discharge, ear pain, facial swelling, hearing loss, mouth sores, nosebleeds, postnasal drip, rhinorrhea, sinus pressure, tinnitus, trouble swallowing and voice change. Eyes: Negative for photophobia, pain, discharge, redness, itching and visual disturbance. Respiratory: Negative for apnea, cough, choking, chest tightness, shortness of breath, wheezing and stridor. Cardiovascular: Negative for chest pain, palpitations and leg swelling. Gastrointestinal: Negative for abdominal distention, abdominal pain, anal bleeding, blood in stool, constipation, diarrhea, nausea, rectal pain and vomiting. Endocrine: Negative. Genitourinary: Negative for decreased urine volume, difficulty urinating, dysuria, flank pain, frequency, genital sores, hematuria, pelvic pain and urgency. Musculoskeletal: Negative for arthralgias, back pain, gait problem, joint swelling, myalgias, neck pain and neck stiffness. Skin: Negative for color change, pallor, rash and wound. Allergic/Immunologic: Negative.     Neurological: Negative for dizziness, tremors, seizures, syncope, facial asymmetry, speech difficulty, weakness, light-headedness, numbness and headaches. Hematological: Negative for adenopathy. Does not bruise/bleed easily. Psychiatric/Behavioral: Negative for agitation, behavioral problems, confusion, decreased concentration, dysphoric mood, hallucinations, self-injury, sleep disturbance and suicidal ideas. The patient is not nervous/anxious and is not hyperactive. Past Medical History:   Diagnosis Date    Eczema     all her life    Hypertension     Thyroid disease     a mass is on trache, having thyroid removed 13       Past Surgical History:   Procedure Laterality Date     SECTION  86 87 90    SALPINGECTOMY      tubal pregnancy    THYROIDECTOMY, PARTIAL  2011    TUBAL LIGATION      TUNNELED VENOUS PORT PLACEMENT Left 2013    mediport in chest       Current Outpatient Prescriptions   Medication Sig Dispense Refill    HYDROcodone-acetaminophen (NORCO) 5-325 MG per tablet Take 1 tablet by mouth every 8 hours as needed for Pain . 15 tablet 0    diclofenac (VOLTAREN) 50 MG EC tablet Take 1 tablet by mouth 2 times daily as needed for Pain (Headache) 30 tablet 0    aspirin EC 81 MG EC tablet Take 1 tablet by mouth daily 30 tablet 3    isosorbide mononitrate (IMDUR) 30 MG extended release tablet Take 1 tablet by mouth daily 30 tablet 3    atorvastatin (LIPITOR) 40 MG tablet Take 0.5 tablets by mouth daily 30 tablet 3    PARoxetine (PAXIL) 10 MG tablet Take 20 mg by mouth nightly      amLODIPine (NORVASC) 5 MG tablet Take 1 tablet by mouth daily (Patient taking differently: Take 10 mg by mouth daily ) 30 tablet 0     No current facility-administered medications for this visit.         Allergies   Allergen Reactions    Fish-Derived Products        Family History   Problem Relation Age of Onset    Cancer Mother     Cancer Maternal Grandfather     Diabetes Paternal Grandfather     Heart Disease Paternal Grandfather        Social History     Social History    Marital status: Single     Spouse name: N/A    Number of children: 3    Years of education: 12     Occupational History    unemployed      Social History Main Topics    Smoking status: Current Every Day Smoker     Packs/day: 1.00     Years: 20.00     Types: Cigarettes    Smokeless tobacco: Never Used    Alcohol use Yes      Comment: social, occasionally    Drug use: No    Sexual activity: Not on file     Other Topics Concern    Not on file     Social History Narrative    No narrative on file     /84 (Site: Left Arm, Position: Sitting, Cuff Size: Medium Adult)   Pulse 110   Temp 97.7 °F (36.5 °C) (Tympanic)   Resp 16   Ht 5' 7\" (1.702 m)   Wt 200 lb (90.7 kg)   LMP 10/17/2017   SpO2 99%   Breastfeeding? No   BMI 31.32 kg/m²     Objective:   Physical Exam   Constitutional: She is oriented to person, place, and time. She appears well-developed and well-nourished. No distress. HENT:   Head: Normocephalic and atraumatic. Right Ear: External ear normal.   Left Ear: External ear normal.   Nose: Nose normal.   Mouth/Throat: Oropharynx is clear and moist and mucous membranes are normal. No oropharyngeal exudate. Eyes: Conjunctivae and EOM are normal. Pupils are equal, round, and reactive to light. Right eye exhibits no discharge. Left eye exhibits no discharge. No scleral icterus. Neck: Trachea normal, normal range of motion, full passive range of motion without pain and phonation normal. Neck supple. No JVD present. Cardiovascular: Normal rate, regular rhythm, S1 normal, S2 normal and normal heart sounds. Exam reveals no gallop and no friction rub. No murmur heard. Pulmonary/Chest: Effort normal and breath sounds normal. No respiratory distress. She has no decreased breath sounds. She has no wheezes. She has no rhonchi. She has no rales. She exhibits no tenderness and no deformity. Abdominal: Soft. Bowel sounds are normal. She exhibits no distension, no abdominal bruit and no mass. There is no hepatosplenomegaly.  There is no

## 2017-11-03 ENCOUNTER — OFFICE VISIT (OUTPATIENT)
Dept: CARDIOLOGY CLINIC | Age: 47
End: 2017-11-03
Payer: MEDICARE

## 2017-11-03 VITALS
SYSTOLIC BLOOD PRESSURE: 123 MMHG | HEIGHT: 67 IN | BODY MASS INDEX: 32.49 KG/M2 | HEART RATE: 84 BPM | WEIGHT: 207 LBS | DIASTOLIC BLOOD PRESSURE: 83 MMHG

## 2017-11-03 DIAGNOSIS — R07.9 CHEST PAIN, UNSPECIFIED TYPE: Primary | ICD-10-CM

## 2017-11-03 PROCEDURE — G8427 DOCREV CUR MEDS BY ELIG CLIN: HCPCS | Performed by: INTERNAL MEDICINE

## 2017-11-03 PROCEDURE — 4004F PT TOBACCO SCREEN RCVD TLK: CPT | Performed by: INTERNAL MEDICINE

## 2017-11-03 PROCEDURE — G8417 CALC BMI ABV UP PARAM F/U: HCPCS | Performed by: INTERNAL MEDICINE

## 2017-11-03 PROCEDURE — 99213 OFFICE O/P EST LOW 20 MIN: CPT | Performed by: INTERNAL MEDICINE

## 2017-11-03 PROCEDURE — G8484 FLU IMMUNIZE NO ADMIN: HCPCS | Performed by: INTERNAL MEDICINE

## 2017-11-03 ASSESSMENT — ENCOUNTER SYMPTOMS
DIARRHEA: 0
COLOR CHANGE: 0
DIARRHEA: 0
TROUBLE SWALLOWING: 0
EYE ITCHING: 0
EYE PAIN: 0
EYE DISCHARGE: 0
NAUSEA: 0
EYE DISCHARGE: 0
BLURRED VISION: 0
SINUS PRESSURE: 0
CHOKING: 0
ABDOMINAL PAIN: 0
VOMITING: 0
ORTHOPNEA: 0
BLOATING: 0
CONSTIPATION: 0
FACIAL SWELLING: 0
BACK PAIN: 0
EYE REDNESS: 0
BACK PAIN: 0
ANAL BLEEDING: 0
CONSTIPATION: 0
BOWEL INCONTINENCE: 0
WHEEZING: 0
ALLERGIC/IMMUNOLOGIC NEGATIVE: 1
HOARSE VOICE: 0
ABDOMINAL DISTENTION: 0
HEMOPTYSIS: 0
DOUBLE VISION: 0
STRIDOR: 0
SPUTUM PRODUCTION: 0
COUGH: 0
ABDOMINAL PAIN: 0
RHINORRHEA: 0
PHOTOPHOBIA: 0
APNEA: 0
CHANGE IN BOWEL HABIT: 0
COUGH: 0
BLOOD IN STOOL: 0
EYE PAIN: 0
RECTAL PAIN: 0
CHEST TIGHTNESS: 0
SHORTNESS OF BREATH: 0
VOICE CHANGE: 0
SHORTNESS OF BREATH: 1

## 2017-11-03 NOTE — PROGRESS NOTES
LABALBU 4.2 10/14/2017       Lab Results   Component Value Date    TRIG 80 10/02/2017    HDL 66 10/02/2017    LDLCALC 98 10/02/2017       Lab Results   Component Value Date    TSH 2.300 10/02/2017         Testing Reviewed:      I have individually reviewed the below cardiac tests    EKG: Sinus rhythm, Inferior infact, poor R wave progression    ECHO:   Results for orders placed during the hospital encounter of 12   ECHO Complete 2D W Doppler W Color    Narrative [address], [address]  Phone: [phone]  Fax: [phone]    Transthoracic Echocardiogram  2D, M-mode, Doppler, and Color Doppler    Name: Selena Webster  : [id number]  MR #: [id number]  Study date: 15-Dec-2012  Account #: [de-identified]  Ht-Wt-BSA: 79 in- 197.6 lb- 2.01 mÂ²    Reading Physician:  Bernabe Bhatia MD  Technologist:  Jyothi Graves, [address] [place] [address]  Referring Physician:  Homer López CNP  Candace [place] for study: eval LV function, dyspnea, HTN    HISTORY: PRIOR HISTORY: SOB, HTN, smoker  /PROCEDURE: The procedure was performed at the bedside. The procedure was  color Doppler. Systolic blood pressure was 117 mmHg, at the start of the study. Diastolic blood pressure was 71 mmHg, at the start of the study. Image quality  was adequate. LEFT VENTRICLE: Size was normal. Systolic function was normal. Ejection  fraction was estimated to be 60 %. There were no regional wall motion  abnormalities. Wall thickness was normal. DOPPLER: Left ventricular diastolic  function parameters were normal.    AORTIC VALVE: The valve was trileaflet. Leaflets exhibited normal thickness and  normal cuspal separation. DOPPLER: Transaortic velocity was within the normal  range. There was no evidence for stenosis. There was no regurgitation. AORTA: The root exhibited normal size. MITRAL VALVE: Valve structure was normal. There was normal leaflet separation.   DOPPLER: The transmitral velocity was within the normal range. There was no  evidence for stenosis. There was mild regurgitation. LEFT ATRIUM: Size was normal.    RIGHT VENTRICLE: The size was normal. Systolic function was normal. Wall  thickness was normal.    PULMONIC VALVE: Not well visualized. DOPPLER: The transpulmonic velocity was  within the normal range. There was no regurgitation. PULMONARY ARTERY: The size was normal. DOPPLER: Systolic pressure was within  the normal range. TRICUSPID VALVE: The valve structure was normal. There was normal leaflet  separation. DOPPLER: The transtricuspid velocity was within the normal range. There was no evidence for stenosis. There was mild regurgitation. RIGHT ATRIUM: Size was normal.    SYSTEMIC VEINS: IVC: The inferior vena cava was normal in size. PERICARDIUM: There was no pericardial effusion. The pericardium was normal in  appearance. SYSTEM MEASUREMENT TABLES    2D mode  LA Dimension (2D): 3.3 cm  FS (2D-Cubed): 31 %  FS (2D-Teich): 31 %  IVSd (2D): 1 cm  IVSd; Mean chosen (2D): 1 cm  LVIDd (2D): 3.6 cm  LVIDs (2D): 2.5 cm  LVOT Area (2D): 3.1 cm2  LVPWd (2D): 1 cm  RVIDd (2D): 3 cm    M mode  AoR Diam (MM): 2.9 cm  AV Cusp Sep (MM): 2 cm  MV D-E Exc: 1.7 cm  MV EF [place] (MM): 9.1 cm/s    Unspecified Scan Mode  VTI; Antegrade Flow: 29.4 cm  Vmax; Antegrade Flow: 1.5 m/s  LVOT Diam: 2 cm  LVOT Vmax: 90.2 cm/s  MV Peak A William; Mean: 52.8 cm/s  MV Peak E William; Antegrade Flow: 82.9 cm/s  Vmax; Antegrade Flow: 82.4 cm/s  TV Peak A William: 52.3 cm/s  TV Peak E William; Antegrade Flow: 71.1 cm/s    SUMMARY:    Left ventricle:  Size was normal.  Systolic function was normal. Ejection fraction was estimated to be 60 %. There were no regional wall motion abnormalities. Mitral valve: There was mild regurgitation. Tricuspid valve: There was mild regurgitation.     Prepared and signed by    Steven Amin MD  Signed 15-Dec-2012 11:34:16       Stress test:   Summary   Lexiscan EKG stress test is not suggestive for ischemia.  Maninder Panda was a moderate sized, moderate in intensity, reversible myocardial   perfusion defect of the carlos-basilar wall.   Attenuation artifact related abnormality can not be excluded with   certainty.      Recommendation   Aggressive risk factor management.   Clinical correlation is recommended due to poor image quality.      Signatures      ----------------------------------------------------------------   Electronically signed by Mariela Wolfe MD (Interpreting   Cardiologist) on 10/20/2017 at 20:27  Assessment/Plan    Chest pains  Abnormal Stress test  HTN  Smoker  Substance abuse    Will schedule for [facility] to evlauate coronaries  On Imdur, asprin, statin and CCB  Underwent evaluation in ED  CT-Chest shows Ascending aortic anuersym of 4.2  Will repeat another CT-Chest in 6 month to see the rate of growth    Patient uses cocaine, last use was one month ago, discussed about quitting the habit  EKG shows inferior infarct and poor R wave progression. Spoke to her about smoking and the dangers of tobacco and the importance of trying to stop smoking. We talked about ways to try to stop such as just stopping \"cold turkey\" (which is usually hard to do and not very successful) nicotine replacement options like the patch and gum and the electronic cigarette. We also talked about Chantix. Total time spent in tobacco counseling was between 3 to 5 minutes. Return in about 3 months (around 2/3/2018) for Regular follow up.            Electronically signed by Lala Gracia MD [facility]  11/3/2017 at 11:07 AM

## 2017-11-13 ENCOUNTER — PREP FOR PROCEDURE (OUTPATIENT)
Dept: CARDIOLOGY | Age: 47
End: 2017-11-13

## 2017-11-13 RX ORDER — DIPHENHYDRAMINE HYDROCHLORIDE 50 MG/ML
50 INJECTION INTRAMUSCULAR; INTRAVENOUS ONCE
Status: CANCELLED | OUTPATIENT
Start: 2017-11-13 | End: 2017-11-13

## 2017-11-13 RX ORDER — SODIUM CHLORIDE 0.9 % (FLUSH) 0.9 %
10 SYRINGE (ML) INJECTION PRN
Status: CANCELLED | OUTPATIENT
Start: 2017-11-13

## 2017-11-13 RX ORDER — ASPIRIN 325 MG
325 TABLET ORAL ONCE
Status: CANCELLED | OUTPATIENT
Start: 2017-11-13 | End: 2017-11-13

## 2017-11-13 RX ORDER — SODIUM CHLORIDE 0.9 % (FLUSH) 0.9 %
10 SYRINGE (ML) INJECTION EVERY 12 HOURS SCHEDULED
Status: CANCELLED | OUTPATIENT
Start: 2017-11-13

## 2017-11-13 RX ORDER — SODIUM CHLORIDE 9 MG/ML
INJECTION, SOLUTION INTRAVENOUS CONTINUOUS
Status: CANCELLED | OUTPATIENT
Start: 2017-11-13

## 2017-11-14 ENCOUNTER — HOSPITAL ENCOUNTER (OUTPATIENT)
Dept: INPATIENT UNIT | Age: 47
Discharge: HOME | End: 2017-11-14
Attending: INTERNAL MEDICINE | Admitting: INTERNAL MEDICINE
Payer: MEDICARE

## 2017-11-14 VITALS
TEMPERATURE: 98 F | HEART RATE: 85 BPM | WEIGHT: 207 LBS | BODY MASS INDEX: 32.49 KG/M2 | SYSTOLIC BLOOD PRESSURE: 138 MMHG | RESPIRATION RATE: 16 BRPM | DIASTOLIC BLOOD PRESSURE: 83 MMHG | OXYGEN SATURATION: 99 % | HEIGHT: 67 IN

## 2017-11-14 LAB
ABO: NORMAL
ANION GAP SERPL CALCULATED.3IONS-SCNC: 9 MEQ/L (ref 8–16)
ANTIBODY SCREEN: NORMAL
APTT: 26.2 SECONDS (ref 22–38)
BUN BLDV-MCNC: 13 MG/DL (ref 7–22)
CALCIUM SERPL-MCNC: 8.6 MG/DL (ref 8.5–10.5)
CHLORIDE BLD-SCNC: 102 MEQ/L (ref 98–111)
CO2: 25 MEQ/L (ref 23–33)
CREAT SERPL-MCNC: 1 MG/DL (ref 0.4–1.2)
EKG ATRIAL RATE: 81 BPM
EKG ATRIAL RATE: 89 BPM
EKG P AXIS: 32 DEGREES
EKG P AXIS: 48 DEGREES
EKG P-R INTERVAL: 152 MS
EKG P-R INTERVAL: 164 MS
EKG Q-T INTERVAL: 368 MS
EKG Q-T INTERVAL: 392 MS
EKG QRS DURATION: 78 MS
EKG QRS DURATION: 80 MS
EKG QTC CALCULATION (BAZETT): 447 MS
EKG QTC CALCULATION (BAZETT): 455 MS
EKG R AXIS: -46 DEGREES
EKG R AXIS: -72 DEGREES
EKG T AXIS: 61 DEGREES
EKG T AXIS: 84 DEGREES
EKG VENTRICULAR RATE: 81 BPM
EKG VENTRICULAR RATE: 89 BPM
GFR SERPL CREATININE-BSD FRML MDRD: 72 ML/MIN/1.73M2
GLUCOSE BLD-MCNC: 93 MG/DL (ref 70–108)
HCT VFR BLD CALC: 37.4 % (ref 37–47)
HEMOGLOBIN: 12.1 GM/DL (ref 12–16)
INR BLD: 1 (ref 0.85–1.13)
MCH RBC QN AUTO: 28.2 PG (ref 27–31)
MCHC RBC AUTO-ENTMCNC: 32.3 GM/DL (ref 33–37)
MCV RBC AUTO: 87.5 FL (ref 81–99)
PDW BLD-RTO: 16.2 % (ref 11.5–14.5)
PLATELET # BLD: 138 THOU/MM3 (ref 130–400)
PMV BLD AUTO: 10.6 MCM (ref 7.4–10.4)
POTASSIUM SERPL-SCNC: 4.2 MEQ/L (ref 3.5–5.2)
PREGNANCY, SERUM: NEGATIVE
RBC # BLD: 4.27 MILL/MM3 (ref 4.2–5.4)
RH FACTOR: NORMAL
SODIUM BLD-SCNC: 136 MEQ/L (ref 135–145)
WBC # BLD: 3.7 THOU/MM3 (ref 4.8–10.8)

## 2017-11-14 PROCEDURE — 6360000002 HC RX W HCPCS: Performed by: NURSE PRACTITIONER

## 2017-11-14 PROCEDURE — 93458 L HRT ARTERY/VENTRICLE ANGIO: CPT

## 2017-11-14 PROCEDURE — 2580000003 HC RX 258: Performed by: NURSE PRACTITIONER

## 2017-11-14 PROCEDURE — 85027 COMPLETE CBC AUTOMATED: CPT

## 2017-11-14 PROCEDURE — 80048 BASIC METABOLIC PNL TOTAL CA: CPT

## 2017-11-14 PROCEDURE — C1894 INTRO/SHEATH, NON-LASER: HCPCS

## 2017-11-14 PROCEDURE — 96367 TX/PROPH/DG ADDL SEQ IV INF: CPT

## 2017-11-14 PROCEDURE — 2720000010 HC SURG SUPPLY STERILE

## 2017-11-14 PROCEDURE — 85610 PROTHROMBIN TIME: CPT

## 2017-11-14 PROCEDURE — 86901 BLOOD TYPING SEROLOGIC RH(D): CPT

## 2017-11-14 PROCEDURE — 93005 ELECTROCARDIOGRAM TRACING: CPT

## 2017-11-14 PROCEDURE — 2780000010 HC IMPLANT OTHER

## 2017-11-14 PROCEDURE — A6257 TRANSPARENT FILM <= 16 SQ IN: HCPCS

## 2017-11-14 PROCEDURE — 86900 BLOOD TYPING SEROLOGIC ABO: CPT

## 2017-11-14 PROCEDURE — A6258 TRANSPARENT FILM >16<=48 IN: HCPCS

## 2017-11-14 PROCEDURE — 86850 RBC ANTIBODY SCREEN: CPT

## 2017-11-14 PROCEDURE — 84703 CHORIONIC GONADOTROPIN ASSAY: CPT

## 2017-11-14 PROCEDURE — 85730 THROMBOPLASTIN TIME PARTIAL: CPT

## 2017-11-14 PROCEDURE — 96365 THER/PROPH/DIAG IV INF INIT: CPT

## 2017-11-14 PROCEDURE — C1769 GUIDE WIRE: HCPCS

## 2017-11-14 PROCEDURE — 36415 COLL VENOUS BLD VENIPUNCTURE: CPT

## 2017-11-14 RX ORDER — DIPHENHYDRAMINE HYDROCHLORIDE 50 MG/ML
50 INJECTION INTRAMUSCULAR; INTRAVENOUS ONCE
Status: COMPLETED | OUTPATIENT
Start: 2017-11-14 | End: 2017-11-14

## 2017-11-14 RX ORDER — SODIUM CHLORIDE 0.9 % (FLUSH) 0.9 %
10 SYRINGE (ML) INJECTION PRN
Status: DISCONTINUED | OUTPATIENT
Start: 2017-11-14 | End: 2017-11-14 | Stop reason: SDUPTHER

## 2017-11-14 RX ORDER — AMLODIPINE BESYLATE 10 MG/1
10 TABLET ORAL DAILY
COMMUNITY
End: 2019-02-25 | Stop reason: SDUPTHER

## 2017-11-14 RX ORDER — ASPIRIN 325 MG
325 TABLET ORAL ONCE
Status: DISCONTINUED | OUTPATIENT
Start: 2017-11-14 | End: 2017-11-14 | Stop reason: HOSPADM

## 2017-11-14 RX ORDER — SODIUM CHLORIDE 0.9 % (FLUSH) 0.9 %
10 SYRINGE (ML) INJECTION EVERY 12 HOURS SCHEDULED
Status: DISCONTINUED | OUTPATIENT
Start: 2017-11-14 | End: 2017-11-14 | Stop reason: SDUPTHER

## 2017-11-14 RX ORDER — SODIUM CHLORIDE 0.9 % (FLUSH) 0.9 %
10 SYRINGE (ML) INJECTION PRN
Status: DISCONTINUED | OUTPATIENT
Start: 2017-11-14 | End: 2017-11-14 | Stop reason: HOSPADM

## 2017-11-14 RX ORDER — ACETAMINOPHEN 325 MG/1
650 TABLET ORAL EVERY 4 HOURS PRN
Status: DISCONTINUED | OUTPATIENT
Start: 2017-11-14 | End: 2017-11-14 | Stop reason: HOSPADM

## 2017-11-14 RX ORDER — SODIUM CHLORIDE 9 MG/ML
INJECTION, SOLUTION INTRAVENOUS CONTINUOUS
Status: DISCONTINUED | OUTPATIENT
Start: 2017-11-14 | End: 2017-11-14 | Stop reason: HOSPADM

## 2017-11-14 RX ORDER — PAROXETINE HYDROCHLORIDE 20 MG/1
20 TABLET, FILM COATED ORAL NIGHTLY
COMMUNITY
End: 2018-02-02 | Stop reason: ALTCHOICE

## 2017-11-14 RX ORDER — ONDANSETRON 2 MG/ML
4 INJECTION INTRAMUSCULAR; INTRAVENOUS EVERY 6 HOURS PRN
Status: DISCONTINUED | OUTPATIENT
Start: 2017-11-14 | End: 2017-11-14 | Stop reason: HOSPADM

## 2017-11-14 RX ORDER — ATORVASTATIN CALCIUM 20 MG/1
20 TABLET, FILM COATED ORAL DAILY
COMMUNITY
End: 2019-02-25 | Stop reason: SDUPTHER

## 2017-11-14 RX ORDER — SODIUM CHLORIDE 9 MG/ML
75 INJECTION, SOLUTION INTRAVENOUS CONTINUOUS
Status: ACTIVE | OUTPATIENT
Start: 2017-11-14 | End: 2017-11-14

## 2017-11-14 RX ORDER — AMLODIPINE BESYLATE 5 MG/1
10 TABLET ORAL DAILY
Status: ON HOLD | COMMUNITY
End: 2017-11-14 | Stop reason: DRUGHIGH

## 2017-11-14 RX ORDER — SODIUM CHLORIDE 0.9 % (FLUSH) 0.9 %
10 SYRINGE (ML) INJECTION EVERY 12 HOURS SCHEDULED
Status: DISCONTINUED | OUTPATIENT
Start: 2017-11-14 | End: 2017-11-14 | Stop reason: HOSPADM

## 2017-11-14 RX ADMIN — DIPHENHYDRAMINE HYDROCHLORIDE 50 MG: 50 INJECTION, SOLUTION INTRAMUSCULAR; INTRAVENOUS at 12:59

## 2017-11-14 RX ADMIN — HYDROCORTISONE SODIUM SUCCINATE 200 MG: 100 INJECTION, POWDER, FOR SOLUTION INTRAMUSCULAR; INTRAVENOUS at 12:53

## 2017-11-14 RX ADMIN — SODIUM CHLORIDE: 9 INJECTION, SOLUTION INTRAVENOUS at 12:01

## 2017-11-14 NOTE — PROGRESS NOTES
[facility]  Sedation/Analgesia History & Physical      Pt Name: Erin Lawson  MRN: [id number]  YOB: 1970  Provider Performing Procedure: Arnulfo Shafer MD  Primary Care Physician: Juvenal Muñiz NP      PRE-PROCEDURE   DNR-CCA/DNR-CC []Yes [x]No      PLANNED PROCEDURE     [x]Cath  []PCI                []Pacemaker/AICD  []SAMANTHA             []Cardioversion []Peripheral angiography/PTA  []Other:        Consent: I have discussed with the patient risks, benefits, and alternatives (and relevant risks, benefits, and side effects related to alternatives or not receiving care), and likelihood of the success. The patient and/or patient representative appear to understand and agree to proceed. The discussion encompasses risks, benefits, and side effects related to the alternatives and the risks related to not receiving the proposed care, treatment, and services. Indications for the Procedure:   CAD Presentation:  Stable Angina -  Angina without a change in frequency or pattern for the 6 weeks prior to CCL visit.   Anginal Classification within 2 weeks:  CCS II - Slight limitation, with angina only during vigorous physical activity  NYHA Heart Failure Class within 2 weeks: No symptoms        Anti- Anginal Meds within 2 weeks:   ANTI-ANGINAL MEDS: Yes: Ca Channel Blockers      Stress or Imaging Studies Performed:  Stress Test with SPECT Result: Positive:  anterior Risk/Extent of Ischemia:  Intermediate    CABG:no          MEDICAL HISTORY        Past Medical History:   Diagnosis Date    Eczema     all her life    Hypertension     Thyroid disease     having thyroid removed 13         Past Surgical History:   Procedure Laterality Date     SECTION  86 87 90    HEEL SPUR SURGERY Right     SALPINGECTOMY      tubal pregnancy    THYROIDECTOMY, PARTIAL  2011    TUBAL LIGATION  2010    TUNNELED VENOUS PORT PLACEMENT Left     mediport in chest           Allergies   Allergen Reactions    Fish-Derived Products Itching         MEDICATIONS   Coumadin Use Last 5 Days [x]No []Yes  Antiplatelet drug therapy use last 5 days  []No [x]Yes  Other anticoagulant use last 5 days  [x]No []Yes      No current facility-administered medications on file prior to encounter. Current Outpatient Prescriptions on File Prior to Encounter   Medication Sig Dispense Refill    aspirin EC 81 MG EC tablet Take 1 tablet by mouth daily 30 tablet 3    isosorbide mononitrate (IMDUR) 30 MG extended release tablet Take 1 tablet by mouth daily 30 tablet 3       Current Facility-Administered Medications   Medication Dose Route Frequency Provider Last Rate Last Dose    0.9 % sodium chloride infusion   Intravenous Continuous Salinas Perez CNP 75 mL/hr at 11/14/17 1201      aspirin tablet 325 mg  325 mg Oral Once [place] RICHARD Perez        sodium chloride flush 0.9 % injection 10 mL  10 mL Intravenous 2 times per day Salinas Perez CNP        sodium chloride flush 0.9 % injection 10 mL  10 mL Intravenous PRN Salinas Perez CNP                 PHYSICAL:     /80 Comment: 106 86 right   Pulse 99   Temp 97.6 °F (36.4 °C) (Oral)   Resp 18   Ht 5' 7\" (1.702 m)   Wt 207 lb (93.9 kg)   LMP 10/17/2017   SpO2 99%   BMI 32.42 kg/m²     Heart:  [x]Regular rate and rhythm  []Other:    Lungs:  [x]Clear    []Other:    Abdomen: [x]Soft    []Other:    Mental Status: [x]Alert & Oriented  []Other:   Ext:                [x]No edema       []Other:         No results for input(s): CKTOTAL, CKMB, CKMBINDEX, TROPONINI in the last 72 hours.     Lab Results   Component Value Date    WBC 3.7 11/14/2017    RBC 4.27 11/14/2017    HGB 12.1 11/14/2017    HCT 37.4 11/14/2017    MCV 87.5 11/14/2017    MCH 28.2 11/14/2017    MCHC 32.3 11/14/2017    RDW 16.2 11/14/2017     11/14/2017    MPV 10.6 11/14/2017       Lab Results   Component Value Date     11/14/2017    K 4.2 11/14/2017     11/14/2017    CO2 25 11/14/2017    BUN 13 11/14/2017    LABALBU 4.2 10/14/2017    CREATININE 1.0 11/14/2017    CALCIUM 8.6 11/14/2017    LABGLOM 72 11/14/2017    GLUCOSE 93 11/14/2017       Lab Results   Component Value Date    ALKPHOS 43 10/14/2017    ALT 10 10/14/2017    AST 13 10/14/2017    PROT 7.4 10/14/2017    BILITOT 0.2 10/14/2017    BILIDIR <0.2 06/11/2014    LABALBU 4.2 10/14/2017       No results found for: MG    No components found for: PTPATWAR, PTINRWAR    No results found for: LABA1C    Lab Results   Component Value Date    TRIG 80 10/02/2017    HDL 66 10/02/2017    LDLCALC 98 10/02/2017       Lab Results   Component Value Date    TSH 2.300 10/02/2017            SEDATION  Planned agent:[x]Midazolam []Meperidine [x]Sublimaze []Morphine []Diazepam  []Other:         ASA Classification:  []1 []2 [x]3 []4 []5  Class 1: A normal healthy patient  Class 2: Pt with mild to moderate systemic disease  Class 3: Severe systemic disease or disturbance  Class 4: Severe systemic disorders that are already life threatening. Class 5: Moribund pt with little chances of survival, for more than 24 hours. Mallampati I Airway Classification:   []1 []2 [x]3 []4      [x]Pre-procedure diagnostic studies complete and results available. Comment:    [x]Previous sedation/anesthesia experiences assessed. Comment:    [x]The patient is an appropriate candidate to undergo the planned procedure sedation and anesthesia. (Refer to nursing sedation/analgesia documentation record)  [x]Formulation and discussion of sedation/procedure plan, risks, and expectations with patient and/or responsible adult completed. [x]Patient examined immediately prior to the procedure.  (Refer to nursing sedation/analgesia documentation record)      Arnulfo Shafer MD  Electronically signed 11/14/2017 at 1:52 PM

## 2017-11-15 NOTE — PLAN OF CARE
Problem: Discharge Planning:  Goal: Discharged to appropriate level of care  Discharged to appropriate level of care   2000 discharge instructions given to pt, \"voices understanding\"    \"ready to go home\"      Problem: Tissue Perfusion - Peripheral, Altered:  Goal: Absence of hematoma at arterial access site  Absence of hematoma at arterial access site   2000 right femoral site looks good with no bleeding or swelling

## 2017-11-19 ENCOUNTER — HOSPITAL ENCOUNTER (EMERGENCY)
Age: 47
Discharge: HOME | End: 2017-11-19
Attending: EMERGENCY MEDICINE
Payer: MEDICARE

## 2017-11-19 ENCOUNTER — APPOINTMENT (OUTPATIENT)
Dept: GENERAL RADIOLOGY | Age: 47
End: 2017-11-19
Payer: MEDICARE

## 2017-11-19 VITALS
SYSTOLIC BLOOD PRESSURE: 105 MMHG | HEART RATE: 100 BPM | DIASTOLIC BLOOD PRESSURE: 73 MMHG | RESPIRATION RATE: 20 BRPM | OXYGEN SATURATION: 98 % | TEMPERATURE: 97.7 F

## 2017-11-19 DIAGNOSIS — R07.89 OTHER CHEST PAIN: Primary | ICD-10-CM

## 2017-11-19 DIAGNOSIS — R07.89 CHEST WALL PAIN: ICD-10-CM

## 2017-11-19 LAB
ANION GAP SERPL CALCULATED.3IONS-SCNC: 10 MEQ/L (ref 8–16)
ANISOCYTOSIS: ABNORMAL
BASOPHILS # BLD: 0.7 %
BASOPHILS ABSOLUTE: 0 THOU/MM3 (ref 0–0.1)
BUN BLDV-MCNC: 20 MG/DL (ref 7–22)
CALCIUM SERPL-MCNC: 8.8 MG/DL (ref 8.5–10.5)
CHLORIDE BLD-SCNC: 100 MEQ/L (ref 98–111)
CO2: 26 MEQ/L (ref 23–33)
CREAT SERPL-MCNC: 1.2 MG/DL (ref 0.4–1.2)
D-DIMER QUANTITATIVE: 322 NG/ML FEU (ref 0–500)
EKG ATRIAL RATE: 105 BPM
EKG P AXIS: 30 DEGREES
EKG P-R INTERVAL: 144 MS
EKG Q-T INTERVAL: 338 MS
EKG QRS DURATION: 76 MS
EKG QTC CALCULATION (BAZETT): 446 MS
EKG R AXIS: -66 DEGREES
EKG T AXIS: 78 DEGREES
EKG VENTRICULAR RATE: 105 BPM
EOSINOPHIL # BLD: 2.9 %
EOSINOPHILS ABSOLUTE: 0.1 THOU/MM3 (ref 0–0.4)
GFR SERPL CREATININE-BSD FRML MDRD: 58 ML/MIN/1.73M2
GLUCOSE BLD-MCNC: 94 MG/DL (ref 70–108)
HCT VFR BLD CALC: 38.9 % (ref 37–47)
HEMOGLOBIN: 12.4 GM/DL (ref 12–16)
LYMPHOCYTES # BLD: 30.1 %
LYMPHOCYTES ABSOLUTE: 1.4 THOU/MM3 (ref 1–4.8)
MCH RBC QN AUTO: 27.8 PG (ref 27–31)
MCHC RBC AUTO-ENTMCNC: 32 GM/DL (ref 33–37)
MCV RBC AUTO: 87 FL (ref 81–99)
MONOCYTES # BLD: 9 %
MONOCYTES ABSOLUTE: 0.4 THOU/MM3 (ref 0.4–1.3)
NUCLEATED RED BLOOD CELLS: 0 /100 WBC
OSMOLALITY CALCULATION: 274.3 MOSMOL/KG (ref 275–300)
PDW BLD-RTO: 16.7 % (ref 11.5–14.5)
PLATELET # BLD: 188 THOU/MM3 (ref 130–400)
PLATELET ESTIMATE: ADEQUATE
PMV BLD AUTO: 10.1 MCM (ref 7.4–10.4)
POTASSIUM SERPL-SCNC: 4.5 MEQ/L (ref 3.5–5.2)
PRO-BNP: 14.8 PG/ML (ref 0–450)
RBC # BLD: 4.48 MILL/MM3 (ref 4.2–5.4)
SCAN OF BLOOD SMEAR: NORMAL
SEG NEUTROPHILS: 57.3 %
SEGMENTED NEUTROPHILS ABSOLUTE COUNT: 2.6 THOU/MM3 (ref 1.8–7.7)
SODIUM BLD-SCNC: 136 MEQ/L (ref 135–145)
TROPONIN T: < 0.01 NG/ML
WBC # BLD: 4.5 THOU/MM3 (ref 4.8–10.8)

## 2017-11-19 PROCEDURE — 99285 EMERGENCY DEPT VISIT HI MDM: CPT

## 2017-11-19 PROCEDURE — 84484 ASSAY OF TROPONIN QUANT: CPT

## 2017-11-19 PROCEDURE — 85379 FIBRIN DEGRADATION QUANT: CPT

## 2017-11-19 PROCEDURE — 36415 COLL VENOUS BLD VENIPUNCTURE: CPT

## 2017-11-19 PROCEDURE — 80048 BASIC METABOLIC PNL TOTAL CA: CPT

## 2017-11-19 PROCEDURE — 93005 ELECTROCARDIOGRAM TRACING: CPT

## 2017-11-19 PROCEDURE — 83880 ASSAY OF NATRIURETIC PEPTIDE: CPT

## 2017-11-19 PROCEDURE — 71010 XR CHEST PORTABLE: CPT

## 2017-11-19 PROCEDURE — 85025 COMPLETE CBC W/AUTO DIFF WBC: CPT

## 2017-11-19 ASSESSMENT — PAIN DESCRIPTION - LOCATION: LOCATION: CHEST

## 2017-11-19 ASSESSMENT — PAIN DESCRIPTION - PAIN TYPE: TYPE: ACUTE PAIN

## 2017-11-19 ASSESSMENT — PAIN SCALES - GENERAL: PAINLEVEL_OUTOF10: 6

## 2017-11-19 ASSESSMENT — PAIN DESCRIPTION - ORIENTATION: ORIENTATION: RIGHT

## 2017-11-19 NOTE — ED PROVIDER NOTES
[facility]      CHIEF COMPLAINT       Chief Complaint   Patient presents with    Chest Pain     began earlier today, heart cath last week. Nurses Notes reviewed and I agree except as noted in the HPI. HISTORY OF PRESENT ILLNESS    Ruel Ruff is a 56 y.o. female who presents with complaint of chest pain, chest wall. Nonradiating. Came on while at rest.  Pain made worse with movement. Patient has no history of CAD. She recently had a heart catheterization, negative studies. Patient states that the pain is like the one she had which led to the heart catheterization. She has no shortness of breath, no history of DVT/PE, no history of coagulopathy  Onset: acute  Duration: prior to arrival  Timing: constant  Location of Pain: right chest pain  Intesity/severity: moderate  Modifying Factors: none  Relieved by; none  Previous Episodes; Tx Before arrival: none  REVIEW OF SYSTEMS      Review of Systems   Constitutional: Negative for fever, chills, diaphoresis and fatigue. HENT: Negative for congestion, drooling, facial swelling and sore throat. Eyes: Negative for photophobia, pain and discharge. Respiratory: Negative for cough, shortness of breath, wheezing and stridor. Cardiovascular: Positive for chest pain; Neg for palpitations and leg swelling. Gastrointestinal: Negative for abdominal pain, blood in stool and abdominal distention. Endocrine: Negative for cold intolerance, heat intolerance, polydipsia and polyuria. Genitourinary: Negative for dysuria, urgency, hematuria and difficulty urinating. Musculoskeletal: Negative for gait problem, neck pain and neck stiffness. Skin; No rash, No itching  Neurological: Negative for seizures, weakness and numbness. Hematological: Negative for adenopathy. Does not bruise/bleed easily. PAST MEDICAL HISTORY    has a past medical history of Eczema; Hypertension; and Thyroid disease.     SURGICAL HISTORY      has a past reveals no gallop. Pulmonary/Chest: Effort normal and breath sounds normal. No accessory muscle usage or stridor. No respiratory distress. no wheezes. has no rales. exhibits no tenderness. Abdominal: Soft. Bowel sounds are normal.  exhibits no distension. There is no tenderness. There is no rebound and no guarding. Extremities: No edema, no tenderness, no cyanosis, no clubbing. Musculoskeletal: Good range of motion in major joints is observed. No major deformities noted. Neurological: Alert and oriented ×3, normal motor function, normal sensory function, no focal deficits. GCS 15  Skin: Skin is warm, dry and intact. No rash noted. No erythema. Psychiatric: Affect normal, judgment normal, mood normal.  DIFFERENTIAL DIAGNOSIS:       DIAGNOSTIC RESULTS     EKG: All EKG's are interpreted by the Emergency Department Physician who either signs or Co-signs this chart in the absence of a cardiologist.      RADIOLOGY: non-plain film images(s) such as CT, Ultrasound and MRI are read by the radiologist.  Plain radiographic images are visualized and preliminarily interpreted by the emergency physician unless otherwise stated below.       LABS:   Labs Reviewed   CBC WITH AUTO DIFFERENTIAL - Abnormal; Notable for the following:        Result Value    WBC 4.5 (*)     MCHC 32.0 (*)     RDW 16.7 (*)     All other components within normal limits   GLOMERULAR FILTRATION RATE, ESTIMATED - Abnormal; Notable for the following:     Est, Glom Filt Rate 58 (*)     All other components within normal limits   OSMOLALITY - Abnormal; Notable for the following:     Osmolality Calc 274.3 (*)     All other components within normal limits   BASIC METABOLIC PANEL   TROPONIN   BRAIN NATRIURETIC PEPTIDE   D-DIMER, QUANTITATIVE   ANION GAP   SCAN OF BLOOD SMEAR       EMERGENCY DEPARTMENT COURSE:   Vitals:    Vitals:    11/19/17 1525   BP: 105/73   Pulse: 100   Resp: 20   Temp: 97.7 °F (36.5 °C)   TempSrc: Oral   SpO2: 98% CRITICAL CARE:       CONSULTS:  None    PROCEDURES:  None    FINAL IMPRESSION      1. Other chest pain    2. Chest wall pain          DISPOSITION/PLAN   Decision To Discharge patient presenting with complaint of chest pain, right chest wall. Made worse with movement. Recent heart catheterization, negative study. The patient initial EKG, nonacute, slight tachycardia noted. The tachycardia is improved since patient has been settling in the ED, heart rate in the 80s at this time. Negative cardiac study, d-dimer within normal limits. Patient's pain is reducible on palpation, discharged home with instruction to remedy pending nonsteroidals. Discharged to follow-up with primary care physician.     PATIENT REFERRED TO:  JOVANY Delcid [id number] [phone]    Call   RE-CHECK AND FURTHER TESTING AS NEEDED      DISCHARGE MEDICATIONS:  Discharge Medication List as of 11/19/2017  5:13 PM          (Please note that portions of this note were completed with a voice recognition program.  Efforts were made to edit the dictations but occasionally words are mis-transcribed.)    DO Yony Brantley DO  11/19/17 1951

## 2018-01-04 ENCOUNTER — HOSPITAL ENCOUNTER (EMERGENCY)
Age: 48
Discharge: HOME | End: 2018-01-04
Payer: MEDICARE

## 2018-01-04 ENCOUNTER — APPOINTMENT (OUTPATIENT)
Dept: GENERAL RADIOLOGY | Age: 48
End: 2018-01-04
Payer: MEDICARE

## 2018-01-04 ENCOUNTER — APPOINTMENT (OUTPATIENT)
Dept: CT IMAGING | Age: 48
End: 2018-01-04
Payer: MEDICARE

## 2018-01-04 VITALS
OXYGEN SATURATION: 95 % | TEMPERATURE: 98.7 F | SYSTOLIC BLOOD PRESSURE: 129 MMHG | HEART RATE: 98 BPM | RESPIRATION RATE: 16 BRPM | DIASTOLIC BLOOD PRESSURE: 82 MMHG

## 2018-01-04 DIAGNOSIS — B34.9 VIRAL ILLNESS: Primary | ICD-10-CM

## 2018-01-04 DIAGNOSIS — N83.202 CYST OF LEFT OVARY: ICD-10-CM

## 2018-01-04 LAB
ALBUMIN SERPL-MCNC: 4.1 G/DL (ref 3.5–5.1)
ALP BLD-CCNC: 50 U/L (ref 38–126)
ALT SERPL-CCNC: 15 U/L (ref 11–66)
ANION GAP SERPL CALCULATED.3IONS-SCNC: 12 MEQ/L (ref 8–16)
ANISOCYTOSIS: ABNORMAL
AST SERPL-CCNC: 21 U/L (ref 5–40)
BACTERIA: ABNORMAL /HPF
BASOPHILS # BLD: 0.3 %
BASOPHILS ABSOLUTE: 0 THOU/MM3 (ref 0–0.1)
BILIRUB SERPL-MCNC: 0.2 MG/DL (ref 0.3–1.2)
BILIRUBIN URINE: NEGATIVE
BLOOD, URINE: ABNORMAL
BUN BLDV-MCNC: 12 MG/DL (ref 7–22)
CALCIUM SERPL-MCNC: 8.7 MG/DL (ref 8.5–10.5)
CASTS 2: ABNORMAL /LPF
CASTS UA: ABNORMAL /LPF
CHARACTER, URINE: CLEAR
CHLORIDE BLD-SCNC: 98 MEQ/L (ref 98–111)
CO2: 26 MEQ/L (ref 23–33)
COLOR: YELLOW
CREAT SERPL-MCNC: 1.4 MG/DL (ref 0.4–1.2)
CRYSTALS, UA: ABNORMAL
EOSINOPHIL # BLD: 0.3 %
EOSINOPHILS ABSOLUTE: 0 THOU/MM3 (ref 0–0.4)
EPITHELIAL CELLS, UA: ABNORMAL /HPF
FLU A ANTIGEN: NEGATIVE
FLU B ANTIGEN: NEGATIVE
GFR SERPL CREATININE-BSD FRML MDRD: 49 ML/MIN/1.73M2
GLUCOSE BLD-MCNC: 138 MG/DL (ref 70–108)
GLUCOSE URINE: NEGATIVE MG/DL
HCT VFR BLD CALC: 37 % (ref 37–47)
HEMOGLOBIN: 12 GM/DL (ref 12–16)
KETONES, URINE: NEGATIVE
LEUKOCYTE ESTERASE, URINE: NEGATIVE
LYMPHOCYTES # BLD: 5.3 %
LYMPHOCYTES ABSOLUTE: 0.3 THOU/MM3 (ref 1–4.8)
MCH RBC QN AUTO: 28.5 PG (ref 27–31)
MCHC RBC AUTO-ENTMCNC: 32.3 GM/DL (ref 33–37)
MCV RBC AUTO: 88 FL (ref 81–99)
MISCELLANEOUS 2: ABNORMAL
MONOCYTES # BLD: 6.3 %
MONOCYTES ABSOLUTE: 0.3 THOU/MM3 (ref 0.4–1.3)
NITRITE, URINE: NEGATIVE
NUCLEATED RED BLOOD CELLS: 0 /100 WBC
OSMOLALITY CALCULATION: 273.9 MOSMOL/KG (ref 275–300)
PDW BLD-RTO: 15.6 % (ref 11.5–14.5)
PH UA: 6
PLATELET # BLD: 155 THOU/MM3 (ref 130–400)
PMV BLD AUTO: 10.1 MCM (ref 7.4–10.4)
POTASSIUM SERPL-SCNC: 4.2 MEQ/L (ref 3.5–5.2)
PROTEIN UA: NEGATIVE
RBC # BLD: 4.2 MILL/MM3 (ref 4.2–5.4)
RBC URINE: ABNORMAL /HPF
RENAL EPITHELIAL, UA: ABNORMAL
SEG NEUTROPHILS: 87.8 %
SEGMENTED NEUTROPHILS ABSOLUTE COUNT: 4.3 THOU/MM3 (ref 1.8–7.7)
SODIUM BLD-SCNC: 136 MEQ/L (ref 135–145)
SPECIFIC GRAVITY, URINE: 1.01 (ref 1–1.03)
TOTAL CK: 117 U/L (ref 30–135)
TOTAL PROTEIN: 6.9 G/DL (ref 6.1–8)
UROBILINOGEN, URINE: 1 EU/DL
WBC # BLD: 4.9 THOU/MM3 (ref 4.8–10.8)
WBC UA: ABNORMAL /HPF
YEAST: ABNORMAL

## 2018-01-04 PROCEDURE — 82550 ASSAY OF CK (CPK): CPT

## 2018-01-04 PROCEDURE — 99285 EMERGENCY DEPT VISIT HI MDM: CPT

## 2018-01-04 PROCEDURE — 96372 THER/PROPH/DIAG INJ SC/IM: CPT

## 2018-01-04 PROCEDURE — 6360000002 HC RX W HCPCS: Performed by: PHYSICIAN ASSISTANT

## 2018-01-04 PROCEDURE — 6370000000 HC RX 637 (ALT 250 FOR IP): Performed by: PHYSICIAN ASSISTANT

## 2018-01-04 PROCEDURE — 71046 X-RAY EXAM CHEST 2 VIEWS: CPT

## 2018-01-04 PROCEDURE — 80053 COMPREHEN METABOLIC PANEL: CPT

## 2018-01-04 PROCEDURE — 85025 COMPLETE CBC W/AUTO DIFF WBC: CPT

## 2018-01-04 PROCEDURE — 81001 URINALYSIS AUTO W/SCOPE: CPT

## 2018-01-04 PROCEDURE — 36415 COLL VENOUS BLD VENIPUNCTURE: CPT

## 2018-01-04 PROCEDURE — 74176 CT ABD & PELVIS W/O CONTRAST: CPT

## 2018-01-04 PROCEDURE — 87804 INFLUENZA ASSAY W/OPTIC: CPT

## 2018-01-04 RX ORDER — KETOROLAC TROMETHAMINE 30 MG/ML
60 INJECTION, SOLUTION INTRAMUSCULAR; INTRAVENOUS ONCE
Status: COMPLETED | OUTPATIENT
Start: 2018-01-04 | End: 2018-01-04

## 2018-01-04 RX ORDER — BENZONATATE 100 MG/1
100 CAPSULE ORAL 3 TIMES DAILY PRN
Qty: 20 CAPSULE | Refills: 0 | Status: SHIPPED | OUTPATIENT
Start: 2018-01-04 | End: 2018-02-02 | Stop reason: ALTCHOICE

## 2018-01-04 RX ORDER — PREDNISONE 20 MG/1
40 TABLET ORAL DAILY
Qty: 10 TABLET | Refills: 0 | Status: SHIPPED | OUTPATIENT
Start: 2018-01-04 | End: 2018-01-09

## 2018-01-04 RX ORDER — BENZONATATE 100 MG/1
200 CAPSULE ORAL ONCE
Status: COMPLETED | OUTPATIENT
Start: 2018-01-04 | End: 2018-01-04

## 2018-01-04 RX ADMIN — KETOROLAC TROMETHAMINE 60 MG: 30 INJECTION, SOLUTION INTRAMUSCULAR at 16:21

## 2018-01-04 RX ADMIN — BENZONATATE 200 MG: 100 CAPSULE ORAL at 16:21

## 2018-01-04 RX ADMIN — Medication 5 ML: at 16:21

## 2018-01-04 ASSESSMENT — ENCOUNTER SYMPTOMS
WHEEZING: 0
NAUSEA: 1
BACK PAIN: 1
EYE DISCHARGE: 0
SHORTNESS OF BREATH: 0
VOMITING: 1
RHINORRHEA: 0
DIARRHEA: 0
SINUS PRESSURE: 1
ABDOMINAL PAIN: 1
SORE THROAT: 0
EYE PAIN: 0
COUGH: 1

## 2018-01-04 ASSESSMENT — PAIN DESCRIPTION - PAIN TYPE: TYPE: ACUTE PAIN

## 2018-01-04 ASSESSMENT — PAIN SCALES - GENERAL: PAINLEVEL_OUTOF10: 9

## 2018-01-04 ASSESSMENT — PAIN DESCRIPTION - LOCATION: LOCATION: GENERALIZED

## 2018-01-04 NOTE — ED PROVIDER NOTES
pallor and rash. Neurological: Positive for weakness. Negative for dizziness, syncope, light-headedness and headaches. Hematological: Negative for adenopathy. Psychiatric/Behavioral: Negative for confusion, dysphoric mood and suicidal ideas. The patient is not nervous/anxious. PAST MEDICAL HISTORY    has a past medical history of Eczema; Hypertension; and Thyroid disease. SURGICAL HISTORY      has a past surgical history that includes  section (86 87 90); salpingectomy; Tubal ligation (); Tunneled venous port placement (Left, ); Thyroidectomy, partial (); and Heel spur surgery (Right). CURRENT MEDICATIONS       Discharge Medication List as of 2018  7:42 PM      CONTINUE these medications which have NOT CHANGED    Details   amLODIPine (NORVASC) 10 MG tablet Take 10 mg by mouth dailyHistorical Med      PARoxetine (PAXIL) 20 MG tablet Take 20 mg by mouth nightlyHistorical Med      atorvastatin (LIPITOR) 20 MG tablet Take 20 mg by mouth dailyHistorical Med      aspirin EC 81 MG EC tablet Take 1 tablet by mouth daily, Disp-30 tablet, R-3Normal             ALLERGIES     is allergic to fish-derived products. FAMILY HISTORY     indicated that the status of her mother is unknown. She indicated that the status of her maternal grandfather is unknown. She indicated that the status of her paternal grandfather is unknown.    family history includes Cancer in her maternal grandfather and mother; Diabetes in her paternal grandfather; Heart Disease in her paternal grandfather. SOCIAL HISTORY      reports that she has been smoking Cigarettes. She has a 5.00 pack-year smoking history. She has never used smokeless tobacco. She reports that she drinks alcohol. She reports that she does not use drugs. PHYSICAL EXAM     INITIAL VITALS:  oral temperature is 98.7 °F (37.1 °C). Her blood pressure is 129/82 and her pulse is 98. Her respiration is 16 and oxygen saturation is 95%.     Physical scribe in my presence, and it accurately records my words and actions.     Rahda Whyte PA-C 1/4/18 1:13 AM        Radha Whyte PA-C  01/24/18 0040

## 2018-02-02 ENCOUNTER — OFFICE VISIT (OUTPATIENT)
Dept: CARDIOLOGY CLINIC | Age: 48
End: 2018-02-02
Payer: MEDICARE

## 2018-02-02 VITALS
HEIGHT: 67 IN | BODY MASS INDEX: 34.4 KG/M2 | WEIGHT: 219.2 LBS | HEART RATE: 80 BPM | DIASTOLIC BLOOD PRESSURE: 82 MMHG | SYSTOLIC BLOOD PRESSURE: 130 MMHG

## 2018-02-02 DIAGNOSIS — I25.10 ASCVD (ARTERIOSCLEROTIC CARDIOVASCULAR DISEASE): Primary | ICD-10-CM

## 2018-02-02 PROCEDURE — 4004F PT TOBACCO SCREEN RCVD TLK: CPT | Performed by: INTERNAL MEDICINE

## 2018-02-02 PROCEDURE — G8484 FLU IMMUNIZE NO ADMIN: HCPCS | Performed by: INTERNAL MEDICINE

## 2018-02-02 PROCEDURE — G8417 CALC BMI ABV UP PARAM F/U: HCPCS | Performed by: INTERNAL MEDICINE

## 2018-02-02 PROCEDURE — 99213 OFFICE O/P EST LOW 20 MIN: CPT | Performed by: INTERNAL MEDICINE

## 2018-02-02 PROCEDURE — G8598 ASA/ANTIPLAT THER USED: HCPCS | Performed by: INTERNAL MEDICINE

## 2018-02-02 PROCEDURE — G8427 DOCREV CUR MEDS BY ELIG CLIN: HCPCS | Performed by: INTERNAL MEDICINE

## 2018-02-02 ASSESSMENT — ENCOUNTER SYMPTOMS
DOUBLE VISION: 0
BACK PAIN: 0
CHANGE IN BOWEL HABIT: 0
BLOATING: 0
CONSTIPATION: 0
EYE DISCHARGE: 0
HOARSE VOICE: 0
COUGH: 0
SPUTUM PRODUCTION: 0
BOWEL INCONTINENCE: 0
HEMOPTYSIS: 0
ABDOMINAL PAIN: 0
DIARRHEA: 0
EYE PAIN: 0

## 2018-02-02 NOTE — PROGRESS NOTES
[phone]    Transthoracic Echocardiogram  2D, M-mode, Doppler, and Color Doppler    Name: Katy Oar  : [id number]  MR #: [id number]  Study date: 15-Dec-2012  Account #: [de-identified]  Ht-Wt-BSA: 79 in- 197.6 lb- 2.01 mÂ²    Reading Physician:  Tatyana Leigh MD  Technologist:  Korin Carmona, [facility]  Referring Physician:  Nicola Abebe. RICHARD López for study: eval LV function, dyspnea, HTN    HISTORY: PRIOR HISTORY: SOB, HTN, smoker  /PROCEDURE: The procedure was performed at the bedside. The procedure was  color Doppler. Systolic blood pressure was 117 mmHg, at the start of the study. Diastolic blood pressure was 71 mmHg, at the start of the study. Image quality  was adequate. LEFT VENTRICLE: Size was normal. Systolic function was normal. Ejection  fraction was estimated to be 60 %. There were no regional wall motion  abnormalities. Wall thickness was normal. DOPPLER: Left ventricular diastolic  function parameters were normal.    AORTIC VALVE: The valve was trileaflet. Leaflets exhibited normal thickness and  normal cuspal separation. DOPPLER: Transaortic velocity was within the normal  range. There was no evidence for stenosis. There was no regurgitation. AORTA: The root exhibited normal size. MITRAL VALVE: Valve structure was normal. There was normal leaflet separation. DOPPLER: The transmitral velocity was within the normal range. There was no  evidence for stenosis. There was mild regurgitation. LEFT ATRIUM: Size was normal.    RIGHT VENTRICLE: The size was normal. Systolic function was normal. Wall  thickness was normal.    PULMONIC VALVE: Not well visualized. DOPPLER: The transpulmonic velocity was  within the normal range. There was no regurgitation. PULMONARY ARTERY: The size was normal. DOPPLER: Systolic pressure was within  the normal range. TRICUSPID VALVE: The valve structure was normal. There was normal leaflet  separation.  DOPPLER: The transtricuspid

## 2018-03-03 DIAGNOSIS — R07.9 CHEST PAIN, UNSPECIFIED TYPE: ICD-10-CM

## 2018-03-05 RX ORDER — ACETAMINOPHEN/DIPHENHYDRAMINE 500MG-25MG
TABLET ORAL
Qty: 30 TABLET | Refills: 6 | Status: SHIPPED | OUTPATIENT
Start: 2018-03-05 | End: 2019-02-25 | Stop reason: SDUPTHER

## 2018-04-03 ENCOUNTER — HOSPITAL ENCOUNTER (EMERGENCY)
Age: 48
Discharge: HOME | End: 2018-04-03
Attending: FAMILY MEDICINE
Payer: MEDICARE

## 2018-04-03 ENCOUNTER — APPOINTMENT (OUTPATIENT)
Dept: CT IMAGING | Age: 48
End: 2018-04-03
Payer: MEDICARE

## 2018-04-03 VITALS
OXYGEN SATURATION: 99 % | RESPIRATION RATE: 18 BRPM | HEART RATE: 81 BPM | BODY MASS INDEX: 34.06 KG/M2 | TEMPERATURE: 97.9 F | WEIGHT: 217 LBS | HEIGHT: 67 IN | SYSTOLIC BLOOD PRESSURE: 162 MMHG | DIASTOLIC BLOOD PRESSURE: 95 MMHG

## 2018-04-03 DIAGNOSIS — R06.89 DYSPNEA AND RESPIRATORY ABNORMALITIES: Primary | ICD-10-CM

## 2018-04-03 DIAGNOSIS — R06.00 DYSPNEA AND RESPIRATORY ABNORMALITIES: Primary | ICD-10-CM

## 2018-04-03 DIAGNOSIS — R03.0 ELEVATED BLOOD PRESSURE READING: ICD-10-CM

## 2018-04-03 DIAGNOSIS — R82.5 POSITIVE URINE DRUG SCREEN: ICD-10-CM

## 2018-04-03 LAB
AMPHETAMINE+METHAMPHETAMINE URINE SCREEN: NEGATIVE
ANION GAP SERPL CALCULATED.3IONS-SCNC: 9 MEQ/L (ref 8–16)
ANISOCYTOSIS: ABNORMAL
BARBITURATE QUANTITATIVE URINE: NEGATIVE
BASOPHILS # BLD: 1.1 %
BASOPHILS ABSOLUTE: 0 THOU/MM3 (ref 0–0.1)
BENZODIAZEPINE QUANTITATIVE URINE: NEGATIVE
BILIRUBIN URINE: NEGATIVE
BLOOD, URINE: NEGATIVE
BUN BLDV-MCNC: 20 MG/DL (ref 7–22)
CALCIUM SERPL-MCNC: 8.9 MG/DL (ref 8.5–10.5)
CANNABINOID QUANTITATIVE URINE: NEGATIVE
CHARACTER, URINE: CLEAR
CHLORIDE BLD-SCNC: 105 MEQ/L (ref 98–111)
CO2: 28 MEQ/L (ref 23–33)
COCAINE METABOLITE QUANTITATIVE URINE: POSITIVE
COLOR: YELLOW
CREAT SERPL-MCNC: 1.4 MG/DL (ref 0.4–1.2)
EOSINOPHIL # BLD: 2.6 %
EOSINOPHILS ABSOLUTE: 0.1 THOU/MM3 (ref 0–0.4)
ETHYL ALCOHOL, SERUM: < 0.01 %
GFR SERPL CREATININE-BSD FRML MDRD: 49 ML/MIN/1.73M2
GLUCOSE BLD-MCNC: 86 MG/DL (ref 70–108)
GLUCOSE URINE: NEGATIVE MG/DL
HCT VFR BLD CALC: 37.7 % (ref 37–47)
HEMOGLOBIN: 12.4 GM/DL (ref 12–16)
KETONES, URINE: NEGATIVE
LEUKOCYTE ESTERASE, URINE: NEGATIVE
LYMPHOCYTES # BLD: 32.8 %
LYMPHOCYTES ABSOLUTE: 1 THOU/MM3 (ref 1–4.8)
MCH RBC QN AUTO: 29.4 PG (ref 27–31)
MCHC RBC AUTO-ENTMCNC: 32.9 GM/DL (ref 33–37)
MCV RBC AUTO: 89.3 FL (ref 81–99)
MONOCYTES # BLD: 11.1 %
MONOCYTES ABSOLUTE: 0.3 THOU/MM3 (ref 0.4–1.3)
NITRITE, URINE: NEGATIVE
NUCLEATED RED BLOOD CELLS: 0 /100 WBC
OPIATES, URINE: NEGATIVE
OSMOLALITY CALCULATION: 285 MOSMOL/KG (ref 275–300)
OXYCODONE: NEGATIVE
PDW BLD-RTO: 14.6 % (ref 11.5–14.5)
PH UA: 7
PHENCYCLIDINE QUANTITATIVE URINE: NEGATIVE
PLATELET # BLD: 161 THOU/MM3 (ref 130–400)
PMV BLD AUTO: 10.7 FL (ref 7.4–10.4)
POTASSIUM SERPL-SCNC: 4.4 MEQ/L (ref 3.5–5.2)
PREGNANCY, SERUM: NEGATIVE
PRO-BNP: 41 PG/ML (ref 0–450)
PROTEIN UA: NEGATIVE
RBC # BLD: 4.22 MILL/MM3 (ref 4.2–5.4)
SEG NEUTROPHILS: 52.4 %
SEGMENTED NEUTROPHILS ABSOLUTE COUNT: 1.6 THOU/MM3 (ref 1.8–7.7)
SODIUM BLD-SCNC: 142 MEQ/L (ref 135–145)
SPECIFIC GRAVITY, URINE: > 1.03 (ref 1–1.03)
TROPONIN T: < 0.01 NG/ML
TSH SERPL DL<=0.05 MIU/L-ACNC: 3.24 UIU/ML (ref 0.4–4.2)
UROBILINOGEN, URINE: 0.2 EU/DL
WBC # BLD: 3 THOU/MM3 (ref 4.8–10.8)

## 2018-04-03 PROCEDURE — 36415 COLL VENOUS BLD VENIPUNCTURE: CPT

## 2018-04-03 PROCEDURE — 96374 THER/PROPH/DIAG INJ IV PUSH: CPT

## 2018-04-03 PROCEDURE — 85025 COMPLETE CBC W/AUTO DIFF WBC: CPT

## 2018-04-03 PROCEDURE — 84703 CHORIONIC GONADOTROPIN ASSAY: CPT

## 2018-04-03 PROCEDURE — G0480 DRUG TEST DEF 1-7 CLASSES: HCPCS

## 2018-04-03 PROCEDURE — 84443 ASSAY THYROID STIM HORMONE: CPT

## 2018-04-03 PROCEDURE — 80307 DRUG TEST PRSMV CHEM ANLYZR: CPT

## 2018-04-03 PROCEDURE — 81003 URINALYSIS AUTO W/O SCOPE: CPT

## 2018-04-03 PROCEDURE — 80048 BASIC METABOLIC PNL TOTAL CA: CPT

## 2018-04-03 PROCEDURE — 84484 ASSAY OF TROPONIN QUANT: CPT

## 2018-04-03 PROCEDURE — 83880 ASSAY OF NATRIURETIC PEPTIDE: CPT

## 2018-04-03 PROCEDURE — 71275 CT ANGIOGRAPHY CHEST: CPT

## 2018-04-03 PROCEDURE — 2580000003 HC RX 258: Performed by: FAMILY MEDICINE

## 2018-04-03 PROCEDURE — 96375 TX/PRO/DX INJ NEW DRUG ADDON: CPT

## 2018-04-03 PROCEDURE — 6360000004 HC RX CONTRAST MEDICATION: Performed by: FAMILY MEDICINE

## 2018-04-03 PROCEDURE — 6360000002 HC RX W HCPCS: Performed by: FAMILY MEDICINE

## 2018-04-03 PROCEDURE — 99285 EMERGENCY DEPT VISIT HI MDM: CPT

## 2018-04-03 RX ORDER — DIPHENHYDRAMINE HYDROCHLORIDE 50 MG/ML
25 INJECTION INTRAMUSCULAR; INTRAVENOUS ONCE
Status: COMPLETED | OUTPATIENT
Start: 2018-04-03 | End: 2018-04-03

## 2018-04-03 RX ORDER — 0.9 % SODIUM CHLORIDE 0.9 %
1000 INTRAVENOUS SOLUTION INTRAVENOUS ONCE
Status: COMPLETED | OUTPATIENT
Start: 2018-04-03 | End: 2018-04-03

## 2018-04-03 RX ORDER — METHYLPREDNISOLONE SODIUM SUCCINATE 125 MG/2ML
80 INJECTION, POWDER, LYOPHILIZED, FOR SOLUTION INTRAMUSCULAR; INTRAVENOUS ONCE
Status: COMPLETED | OUTPATIENT
Start: 2018-04-03 | End: 2018-04-03

## 2018-04-03 RX ADMIN — METHYLPREDNISOLONE SODIUM SUCCINATE 80 MG: 125 INJECTION, POWDER, FOR SOLUTION INTRAMUSCULAR; INTRAVENOUS at 15:14

## 2018-04-03 RX ADMIN — IOPAMIDOL 80 ML: 755 INJECTION, SOLUTION INTRAVENOUS at 15:48

## 2018-04-03 RX ADMIN — SODIUM CHLORIDE 1000 ML: 9 INJECTION, SOLUTION INTRAVENOUS at 15:31

## 2018-04-03 RX ADMIN — SODIUM CHLORIDE 1000 ML: 9 INJECTION, SOLUTION INTRAVENOUS at 17:52

## 2018-04-03 RX ADMIN — DIPHENHYDRAMINE HYDROCHLORIDE 25 MG: 50 INJECTION, SOLUTION INTRAMUSCULAR; INTRAVENOUS at 15:14

## 2018-04-03 ASSESSMENT — ENCOUNTER SYMPTOMS
DIARRHEA: 0
SHORTNESS OF BREATH: 1
COUGH: 0
WHEEZING: 0
EYE PAIN: 0
SORE THROAT: 0
VOMITING: 1
BACK PAIN: 0
EYE DISCHARGE: 0
NAUSEA: 0
ABDOMINAL PAIN: 0
RHINORRHEA: 0

## 2018-06-30 ENCOUNTER — APPOINTMENT (OUTPATIENT)
Dept: ULTRASOUND IMAGING | Age: 48
DRG: 469 | End: 2018-06-30
Payer: MEDICARE

## 2018-06-30 ENCOUNTER — HOSPITAL ENCOUNTER (INPATIENT)
Age: 48
LOS: 2 days | Discharge: HOME | DRG: 469 | End: 2018-07-02
Attending: EMERGENCY MEDICINE | Admitting: HOSPITALIST
Payer: MEDICARE

## 2018-06-30 ENCOUNTER — APPOINTMENT (OUTPATIENT)
Dept: CT IMAGING | Age: 48
DRG: 469 | End: 2018-06-30
Payer: MEDICARE

## 2018-06-30 DIAGNOSIS — N17.9 ACUTE KIDNEY INJURY SUPERIMPOSED ON CHRONIC KIDNEY DISEASE (HCC): Primary | ICD-10-CM

## 2018-06-30 DIAGNOSIS — R10.9 FLANK PAIN: ICD-10-CM

## 2018-06-30 DIAGNOSIS — N18.9 ACUTE KIDNEY INJURY SUPERIMPOSED ON CHRONIC KIDNEY DISEASE (HCC): Primary | ICD-10-CM

## 2018-06-30 LAB
ALBUMIN SERPL-MCNC: 4.5 G/DL (ref 3.5–5.1)
ALP BLD-CCNC: 44 U/L (ref 38–126)
ALT SERPL-CCNC: 10 U/L (ref 11–66)
ANION GAP SERPL CALCULATED.3IONS-SCNC: 13 MEQ/L (ref 8–16)
ANION GAP SERPL CALCULATED.3IONS-SCNC: 14 MEQ/L (ref 8–16)
AST SERPL-CCNC: 22 U/L (ref 5–40)
BACTERIA: ABNORMAL /HPF
BASOPHILS # BLD: 0.3 %
BASOPHILS ABSOLUTE: 0 THOU/MM3 (ref 0–0.1)
BILIRUB SERPL-MCNC: 0.4 MG/DL (ref 0.3–1.2)
BILIRUBIN DIRECT: < 0.2 MG/DL (ref 0–0.3)
BILIRUBIN URINE: NEGATIVE
BLOOD, URINE: NEGATIVE
BUN BLDV-MCNC: 23 MG/DL (ref 7–22)
BUN BLDV-MCNC: 28 MG/DL (ref 7–22)
CALCIUM SERPL-MCNC: 8.4 MG/DL (ref 8.5–10.5)
CALCIUM SERPL-MCNC: 9.7 MG/DL (ref 8.5–10.5)
CASTS 2: ABNORMAL /LPF
CASTS UA: ABNORMAL /LPF
CHARACTER, URINE: ABNORMAL
CHLAMYDIA TRACHOMATIS BY RT-PCR: NOT DETECTED
CHLORIDE BLD-SCNC: 100 MEQ/L (ref 98–111)
CHLORIDE BLD-SCNC: 103 MEQ/L (ref 98–111)
CO2: 20 MEQ/L (ref 23–33)
CO2: 22 MEQ/L (ref 23–33)
COLOR: YELLOW
CREAT SERPL-MCNC: 2.1 MG/DL (ref 0.4–1.2)
CREAT SERPL-MCNC: 2.8 MG/DL (ref 0.4–1.2)
CREATININE URINE: 242 MG/DL
CRYSTALS, UA: ABNORMAL
CT/NG SOURCE: NORMAL
EKG ATRIAL RATE: 89 BPM
EKG P AXIS: 24 DEGREES
EKG P-R INTERVAL: 154 MS
EKG Q-T INTERVAL: 366 MS
EKG QRS DURATION: 76 MS
EKG QTC CALCULATION (BAZETT): 445 MS
EKG R AXIS: -70 DEGREES
EKG T AXIS: 67 DEGREES
EKG VENTRICULAR RATE: 89 BPM
EOSINOPHIL # BLD: 0 %
EOSINOPHILS ABSOLUTE: 0 THOU/MM3 (ref 0–0.4)
EPITHELIAL CELLS, UA: ABNORMAL /HPF
ERYTHROCYTE [DISTWIDTH] IN BLOOD BY AUTOMATED COUNT: 14.2 % (ref 11.5–14.5)
ERYTHROCYTE [DISTWIDTH] IN BLOOD BY AUTOMATED COUNT: 44.9 FL (ref 35–45)
GFR SERPL CREATININE-BSD FRML MDRD: 22 ML/MIN/1.73M2
GFR SERPL CREATININE-BSD FRML MDRD: 30 ML/MIN/1.73M2
GLUCOSE BLD-MCNC: 80 MG/DL (ref 70–108)
GLUCOSE BLD-MCNC: 88 MG/DL (ref 70–108)
GLUCOSE URINE: NEGATIVE MG/DL
HCT VFR BLD CALC: 40.8 % (ref 37–47)
HEMOGLOBIN: 13.3 GM/DL (ref 12–16)
IMMATURE GRANS (ABS): 0.01 THOU/MM3 (ref 0–0.07)
IMMATURE GRANULOCYTES: 0.3 %
KETONES, URINE: ABNORMAL
LACTIC ACID: 0.6 MMOL/L (ref 0.5–2.2)
LEUKOCYTE ESTERASE, URINE: NEGATIVE
LIPASE: 63.8 U/L (ref 5.6–51.3)
LYMPHOCYTES # BLD: 16.9 %
LYMPHOCYTES ABSOLUTE: 0.6 THOU/MM3 (ref 1–4.8)
MCH RBC QN AUTO: 28.3 PG (ref 26–33)
MCHC RBC AUTO-ENTMCNC: 32.6 GM/DL (ref 32.2–35.5)
MCV RBC AUTO: 86.8 FL (ref 81–99)
MONOCYTES # BLD: 9.5 %
MONOCYTES ABSOLUTE: 0.3 THOU/MM3 (ref 0.4–1.3)
MUCUS: ABNORMAL
NEISSERIA GONORRHOEAE BY RT-PCR: NOT DETECTED
NITRITE, URINE: NEGATIVE
NUCLEATED RED BLOOD CELLS: 0 /100 WBC
OSMOLALITY CALCULATION: 276.4 MOSMOL/KG (ref 275–300)
PH UA: 5
PLATELET # BLD: 200 THOU/MM3 (ref 130–400)
PMV BLD AUTO: 12.6 FL (ref 9.4–12.4)
POTASSIUM SERPL-SCNC: 3.8 MEQ/L (ref 3.5–5.2)
POTASSIUM SERPL-SCNC: 4.2 MEQ/L (ref 3.5–5.2)
PREGNANCY, SERUM: NEGATIVE
PROTEIN UA: 100
RBC # BLD: 4.7 MILL/MM3 (ref 4.2–5.4)
RBC URINE: ABNORMAL /HPF
RENAL EPITHELIAL, UA: ABNORMAL
SEG NEUTROPHILS: 73 %
SEGMENTED NEUTROPHILS ABSOLUTE COUNT: 2.4 THOU/MM3 (ref 1.8–7.7)
SODIUM BLD-SCNC: 136 MEQ/L (ref 135–145)
SODIUM BLD-SCNC: 136 MEQ/L (ref 135–145)
SODIUM URINE: 58 MEQ/L
SPECIFIC GRAVITY, URINE: 1.02 (ref 1–1.03)
TOTAL PROTEIN: 8 G/DL (ref 6.1–8)
UROBILINOGEN, URINE: 0.2 EU/DL
WBC # BLD: 3.3 THOU/MM3 (ref 4.8–10.8)
WBC UA: ABNORMAL /HPF
YEAST: ABNORMAL

## 2018-06-30 PROCEDURE — 83690 ASSAY OF LIPASE: CPT

## 2018-06-30 PROCEDURE — 6360000002 HC RX W HCPCS: Performed by: EMERGENCY MEDICINE

## 2018-06-30 PROCEDURE — 2580000003 HC RX 258: Performed by: EMERGENCY MEDICINE

## 2018-06-30 PROCEDURE — 36415 COLL VENOUS BLD VENIPUNCTURE: CPT

## 2018-06-30 PROCEDURE — 83605 ASSAY OF LACTIC ACID: CPT

## 2018-06-30 PROCEDURE — 80048 BASIC METABOLIC PNL TOTAL CA: CPT

## 2018-06-30 PROCEDURE — 6360000002 HC RX W HCPCS: Performed by: INTERNAL MEDICINE

## 2018-06-30 PROCEDURE — 6370000000 HC RX 637 (ALT 250 FOR IP): Performed by: INTERNAL MEDICINE

## 2018-06-30 PROCEDURE — 96374 THER/PROPH/DIAG INJ IV PUSH: CPT

## 2018-06-30 PROCEDURE — 80076 HEPATIC FUNCTION PANEL: CPT

## 2018-06-30 PROCEDURE — 87491 CHLMYD TRACH DNA AMP PROBE: CPT

## 2018-06-30 PROCEDURE — 87591 N.GONORRHOEAE DNA AMP PROB: CPT

## 2018-06-30 PROCEDURE — 93005 ELECTROCARDIOGRAM TRACING: CPT | Performed by: EMERGENCY MEDICINE

## 2018-06-30 PROCEDURE — 81001 URINALYSIS AUTO W/SCOPE: CPT

## 2018-06-30 PROCEDURE — 99285 EMERGENCY DEPT VISIT HI MDM: CPT

## 2018-06-30 PROCEDURE — 85025 COMPLETE CBC W/AUTO DIFF WBC: CPT

## 2018-06-30 PROCEDURE — 84300 ASSAY OF URINE SODIUM: CPT

## 2018-06-30 PROCEDURE — 87086 URINE CULTURE/COLONY COUNT: CPT

## 2018-06-30 PROCEDURE — 2580000003 HC RX 258: Performed by: INTERNAL MEDICINE

## 2018-06-30 PROCEDURE — 99222 1ST HOSP IP/OBS MODERATE 55: CPT | Performed by: INTERNAL MEDICINE

## 2018-06-30 PROCEDURE — 96375 TX/PRO/DX INJ NEW DRUG ADDON: CPT

## 2018-06-30 PROCEDURE — 76770 US EXAM ABDO BACK WALL COMP: CPT

## 2018-06-30 PROCEDURE — 93010 ELECTROCARDIOGRAM REPORT: CPT | Performed by: NUCLEAR MEDICINE

## 2018-06-30 PROCEDURE — 87040 BLOOD CULTURE FOR BACTERIA: CPT

## 2018-06-30 PROCEDURE — 86304 IMMUNOASSAY TUMOR CA 125: CPT

## 2018-06-30 PROCEDURE — 1200000000 HC SEMI PRIVATE

## 2018-06-30 PROCEDURE — 74176 CT ABD & PELVIS W/O CONTRAST: CPT

## 2018-06-30 PROCEDURE — 84703 CHORIONIC GONADOTROPIN ASSAY: CPT

## 2018-06-30 PROCEDURE — 76830 TRANSVAGINAL US NON-OB: CPT

## 2018-06-30 PROCEDURE — 96376 TX/PRO/DX INJ SAME DRUG ADON: CPT

## 2018-06-30 PROCEDURE — 82570 ASSAY OF URINE CREATININE: CPT

## 2018-06-30 PROCEDURE — 93976 VASCULAR STUDY: CPT

## 2018-06-30 RX ORDER — MORPHINE SULFATE 2 MG/ML
2 INJECTION, SOLUTION INTRAMUSCULAR; INTRAVENOUS EVERY 4 HOURS PRN
Status: DISCONTINUED | OUTPATIENT
Start: 2018-06-30 | End: 2018-06-30

## 2018-06-30 RX ORDER — FENTANYL CITRATE 50 UG/ML
50 INJECTION, SOLUTION INTRAMUSCULAR; INTRAVENOUS
Status: COMPLETED | OUTPATIENT
Start: 2018-06-30 | End: 2018-06-30

## 2018-06-30 RX ORDER — AMLODIPINE BESYLATE 10 MG/1
10 TABLET ORAL DAILY
Status: DISCONTINUED | OUTPATIENT
Start: 2018-06-30 | End: 2018-07-02 | Stop reason: HOSPADM

## 2018-06-30 RX ORDER — SODIUM CHLORIDE 9 MG/ML
INJECTION, SOLUTION INTRAVENOUS CONTINUOUS
Status: DISCONTINUED | OUTPATIENT
Start: 2018-06-30 | End: 2018-07-02 | Stop reason: HOSPADM

## 2018-06-30 RX ORDER — SODIUM CHLORIDE 0.9 % (FLUSH) 0.9 %
10 SYRINGE (ML) INJECTION EVERY 12 HOURS SCHEDULED
Status: DISCONTINUED | OUTPATIENT
Start: 2018-06-30 | End: 2018-07-02 | Stop reason: HOSPADM

## 2018-06-30 RX ORDER — DOCUSATE SODIUM 100 MG/1
100 CAPSULE, LIQUID FILLED ORAL 2 TIMES DAILY
Status: DISCONTINUED | OUTPATIENT
Start: 2018-06-30 | End: 2018-07-02 | Stop reason: HOSPADM

## 2018-06-30 RX ORDER — 0.9 % SODIUM CHLORIDE 0.9 %
1000 INTRAVENOUS SOLUTION INTRAVENOUS ONCE
Status: COMPLETED | OUTPATIENT
Start: 2018-06-30 | End: 2018-06-30

## 2018-06-30 RX ORDER — SODIUM CHLORIDE 0.9 % (FLUSH) 0.9 %
10 SYRINGE (ML) INJECTION PRN
Status: DISCONTINUED | OUTPATIENT
Start: 2018-06-30 | End: 2018-07-02 | Stop reason: HOSPADM

## 2018-06-30 RX ORDER — BUPROPION HYDROCHLORIDE 150 MG/1
150 TABLET, EXTENDED RELEASE ORAL DAILY
Status: DISCONTINUED | OUTPATIENT
Start: 2018-06-30 | End: 2018-07-02 | Stop reason: HOSPADM

## 2018-06-30 RX ORDER — SODIUM CHLORIDE 9 MG/ML
INJECTION, SOLUTION INTRAVENOUS CONTINUOUS
Status: DISCONTINUED | OUTPATIENT
Start: 2018-06-30 | End: 2018-06-30

## 2018-06-30 RX ORDER — ATORVASTATIN CALCIUM 20 MG/1
20 TABLET, FILM COATED ORAL DAILY
Status: DISCONTINUED | OUTPATIENT
Start: 2018-06-30 | End: 2018-07-02 | Stop reason: HOSPADM

## 2018-06-30 RX ORDER — POLYETHYLENE GLYCOL 3350 17 G/17G
17 POWDER, FOR SOLUTION ORAL DAILY
Status: DISCONTINUED | OUTPATIENT
Start: 2018-07-01 | End: 2018-07-02 | Stop reason: HOSPADM

## 2018-06-30 RX ORDER — KETOROLAC TROMETHAMINE 30 MG/ML
30 INJECTION, SOLUTION INTRAMUSCULAR; INTRAVENOUS ONCE
Status: COMPLETED | OUTPATIENT
Start: 2018-06-30 | End: 2018-06-30

## 2018-06-30 RX ORDER — ASPIRIN 81 MG/1
81 TABLET ORAL DAILY
Status: DISCONTINUED | OUTPATIENT
Start: 2018-06-30 | End: 2018-07-02 | Stop reason: HOSPADM

## 2018-06-30 RX ORDER — ONDANSETRON 2 MG/ML
4 INJECTION INTRAMUSCULAR; INTRAVENOUS EVERY 6 HOURS PRN
Status: DISCONTINUED | OUTPATIENT
Start: 2018-06-30 | End: 2018-06-30

## 2018-06-30 RX ORDER — MORPHINE SULFATE 2 MG/ML
2 INJECTION, SOLUTION INTRAMUSCULAR; INTRAVENOUS
Status: DISCONTINUED | OUTPATIENT
Start: 2018-06-30 | End: 2018-07-02 | Stop reason: HOSPADM

## 2018-06-30 RX ORDER — HYDROCODONE BITARTRATE AND ACETAMINOPHEN 5; 325 MG/1; MG/1
2 TABLET ORAL EVERY 6 HOURS PRN
Status: DISCONTINUED | OUTPATIENT
Start: 2018-06-30 | End: 2018-07-02 | Stop reason: HOSPADM

## 2018-06-30 RX ORDER — ONDANSETRON 2 MG/ML
4 INJECTION INTRAMUSCULAR; INTRAVENOUS ONCE
Status: COMPLETED | OUTPATIENT
Start: 2018-06-30 | End: 2018-06-30

## 2018-06-30 RX ORDER — HYDROCODONE BITARTRATE AND ACETAMINOPHEN 5; 325 MG/1; MG/1
1 TABLET ORAL EVERY 6 HOURS PRN
Status: DISCONTINUED | OUTPATIENT
Start: 2018-06-30 | End: 2018-07-02 | Stop reason: HOSPADM

## 2018-06-30 RX ORDER — BUPROPION HYDROCHLORIDE 150 MG/1
150 TABLET, EXTENDED RELEASE ORAL DAILY
COMMUNITY
End: 2019-04-29 | Stop reason: ALTCHOICE

## 2018-06-30 RX ORDER — TRAMADOL HYDROCHLORIDE 50 MG/1
50 TABLET ORAL EVERY 6 HOURS PRN
Status: DISCONTINUED | OUTPATIENT
Start: 2018-06-30 | End: 2018-06-30

## 2018-06-30 RX ORDER — NICOTINE 21 MG/24HR
1 PATCH, TRANSDERMAL 24 HOURS TRANSDERMAL DAILY
Status: DISCONTINUED | OUTPATIENT
Start: 2018-06-30 | End: 2018-07-02 | Stop reason: HOSPADM

## 2018-06-30 RX ORDER — ONDANSETRON 2 MG/ML
4 INJECTION INTRAMUSCULAR; INTRAVENOUS EVERY 4 HOURS PRN
Status: DISCONTINUED | OUTPATIENT
Start: 2018-06-30 | End: 2018-07-02 | Stop reason: HOSPADM

## 2018-06-30 RX ORDER — PANTOPRAZOLE SODIUM 40 MG/1
40 TABLET, DELAYED RELEASE ORAL
Status: DISCONTINUED | OUTPATIENT
Start: 2018-06-30 | End: 2018-07-02 | Stop reason: HOSPADM

## 2018-06-30 RX ADMIN — ONDANSETRON 4 MG: 2 INJECTION INTRAMUSCULAR; INTRAVENOUS at 02:40

## 2018-06-30 RX ADMIN — SODIUM CHLORIDE: 9 INJECTION, SOLUTION INTRAVENOUS at 11:07

## 2018-06-30 RX ADMIN — AMLODIPINE BESYLATE 10 MG: 10 TABLET ORAL at 11:07

## 2018-06-30 RX ADMIN — FENTANYL CITRATE 50 MCG: 50 INJECTION, SOLUTION INTRAMUSCULAR; INTRAVENOUS at 03:22

## 2018-06-30 RX ADMIN — ATORVASTATIN CALCIUM 20 MG: 20 TABLET, FILM COATED ORAL at 11:07

## 2018-06-30 RX ADMIN — PIPERACILLIN SODIUM AND TAZOBACTAM SODIUM 3.38 G: 3; .375 INJECTION, POWDER, LYOPHILIZED, FOR SOLUTION INTRAVENOUS at 11:08

## 2018-06-30 RX ADMIN — SODIUM CHLORIDE: 9 INJECTION, SOLUTION INTRAVENOUS at 21:09

## 2018-06-30 RX ADMIN — FENTANYL CITRATE 50 MCG: 50 INJECTION, SOLUTION INTRAMUSCULAR; INTRAVENOUS at 05:51

## 2018-06-30 RX ADMIN — PANTOPRAZOLE SODIUM 40 MG: 40 TABLET, DELAYED RELEASE ORAL at 11:07

## 2018-06-30 RX ADMIN — ASPIRIN 81 MG: 81 TABLET ORAL at 11:07

## 2018-06-30 RX ADMIN — SODIUM CHLORIDE 1000 ML: 9 INJECTION, SOLUTION INTRAVENOUS at 02:41

## 2018-06-30 RX ADMIN — HYDROCODONE BITARTRATE AND ACETAMINOPHEN 2 TABLET: 5; 325 TABLET ORAL at 23:58

## 2018-06-30 RX ADMIN — MORPHINE SULFATE 2 MG: 2 INJECTION, SOLUTION INTRAMUSCULAR; INTRAVENOUS at 11:06

## 2018-06-30 RX ADMIN — DOCUSATE SODIUM 100 MG: 100 CAPSULE, LIQUID FILLED ORAL at 11:07

## 2018-06-30 RX ADMIN — KETOROLAC TROMETHAMINE 30 MG: 30 INJECTION, SOLUTION INTRAMUSCULAR at 02:40

## 2018-06-30 RX ADMIN — HYDROCODONE BITARTRATE AND ACETAMINOPHEN 1 TABLET: 5; 325 TABLET ORAL at 17:49

## 2018-06-30 RX ADMIN — SODIUM CHLORIDE: 9 INJECTION, SOLUTION INTRAVENOUS at 05:43

## 2018-06-30 RX ADMIN — BUPROPION HYDROCHLORIDE 150 MG: 150 TABLET, EXTENDED RELEASE ORAL at 11:08

## 2018-06-30 RX ADMIN — TRAMADOL HYDROCHLORIDE 50 MG: 50 TABLET, FILM COATED ORAL at 16:22

## 2018-06-30 RX ADMIN — PIPERACILLIN SODIUM AND TAZOBACTAM SODIUM 3.38 G: 3; .375 INJECTION, POWDER, LYOPHILIZED, FOR SOLUTION INTRAVENOUS at 21:08

## 2018-06-30 RX ADMIN — SODIUM CHLORIDE: 9 INJECTION, SOLUTION INTRAVENOUS at 16:20

## 2018-06-30 ASSESSMENT — PAIN DESCRIPTION - ORIENTATION
ORIENTATION: LEFT

## 2018-06-30 ASSESSMENT — ENCOUNTER SYMPTOMS
SORE THROAT: 0
WHEEZING: 0
EYE DISCHARGE: 0
BACK PAIN: 0
NAUSEA: 1
RHINORRHEA: 0
ABDOMINAL PAIN: 1
SINUS PRESSURE: 1
DIARRHEA: 0
SHORTNESS OF BREATH: 1
COUGH: 0
EYE PAIN: 0
VOMITING: 1

## 2018-06-30 ASSESSMENT — PAIN SCALES - GENERAL
PAINLEVEL_OUTOF10: 8
PAINLEVEL_OUTOF10: 6
PAINLEVEL_OUTOF10: 3
PAINLEVEL_OUTOF10: 8
PAINLEVEL_OUTOF10: 5
PAINLEVEL_OUTOF10: 8
PAINLEVEL_OUTOF10: 5
PAINLEVEL_OUTOF10: 5
PAINLEVEL_OUTOF10: 8
PAINLEVEL_OUTOF10: 4
PAINLEVEL_OUTOF10: 5
PAINLEVEL_OUTOF10: 6
PAINLEVEL_OUTOF10: 6
PAINLEVEL_OUTOF10: 8

## 2018-06-30 ASSESSMENT — PAIN DESCRIPTION - LOCATION
LOCATION: ABDOMEN
LOCATION: ABDOMEN;FLANK

## 2018-06-30 ASSESSMENT — PAIN DESCRIPTION - DESCRIPTORS
DESCRIPTORS: CRAMPING;DISCOMFORT;SHARP
DESCRIPTORS: CRAMPING;ACHING

## 2018-06-30 ASSESSMENT — PAIN DESCRIPTION - PAIN TYPE
TYPE: ACUTE PAIN

## 2018-06-30 NOTE — ED PROVIDER NOTES
back pain, joint swelling and neck pain. Skin: Negative for pallor and rash. Neurological: Positive for light-headedness. Negative for dizziness, syncope, weakness, numbness and headaches. Hematological: Negative for adenopathy. Psychiatric/Behavioral: Negative for confusion and suicidal ideas. The patient is not nervous/anxious. PAST MEDICAL HISTORY    has a past medical history of Aneurysm ([address] Eczema; Hypertension; and Thyroid disease. SURGICAL HISTORY      has a past surgical history that includes  section (86 87 90); salpingectomy; Tubal ligation (); Tunneled venous port placement (Left, 2013); Thyroidectomy, partial (); and Heel spur surgery (Right). CURRENT MEDICATIONS       Current Discharge Medication List      CONTINUE these medications which have NOT CHANGED    Details   buPROPion (WELLBUTRIN SR) 150 MG extended release tablet Take 150 mg by mouth daily      RA ASPIRIN EC 81 MG EC tablet take 1 tablet by mouth once daily  Qty: 30 tablet, Refills: 6    Associated Diagnoses: Chest pain, unspecified type      amLODIPine (NORVASC) 10 MG tablet Take 10 mg by mouth daily      atorvastatin (LIPITOR) 20 MG tablet Take 20 mg by mouth daily             ALLERGIES     is allergic to fish-derived products. FAMILY HISTORY     indicated that the status of her mother is unknown. She indicated that the status of her maternal grandfather is unknown. She indicated that the status of her paternal grandfather is unknown.    family history includes Cancer in her maternal grandfather and mother; Diabetes in her paternal grandfather; Heart Disease in her paternal grandfather. SOCIAL HISTORY      reports that she has been smoking Cigarettes. She has a 8.25 pack-year smoking history. She has never used smokeless tobacco. She reports that she drinks alcohol. She reports that she uses drugs, including Marijuana.     PHYSICAL EXAM       ED Triage Vitals [18 0206]   BP Temp Temp Source Pulse Resp SpO2 Height Weight   (!) 133/95 97.8 °F (36.6 °C) Oral 95 18 99 % 5' 7\" (1.702 m) 201 lb (91.2 kg)      Physical Exam   Constitutional: She is oriented to person, place, and time. She appears well-developed and well-nourished. HENT:   Head: Normocephalic and atraumatic. Right Ear: External ear normal.   Left Ear: External ear normal.   Eyes: Conjunctivae are normal. Right eye exhibits no discharge. Left eye exhibits no discharge. No scleral icterus. Neck: Normal range of motion. Neck supple. No JVD present. Cardiovascular: Normal rate, regular rhythm and normal heart sounds. Exam reveals no gallop and no friction rub. No murmur heard. Pulmonary/Chest: Effort normal and breath sounds normal. No stridor. No respiratory distress. Abdominal: Soft. She exhibits no distension. There is tenderness in the left lower quadrant. There is CVA tenderness (left). Musculoskeletal: Normal range of motion. She exhibits no edema. Neurological: She is alert and oriented to person, place, and time. She exhibits normal muscle tone. GCS eye subscore is 4. GCS verbal subscore is 5. GCS motor subscore is 6. Skin: Skin is warm and dry. She is not diaphoretic. No erythema. Psychiatric: She has a normal mood and affect. Her behavior is normal.   Nursing note and vitals reviewed. DIFFERENTIAL DIAGNOSIS:   Including but not limited to: left flank pain, UTI, renal colic, colitis, DVT    DIAGNOSTIC RESULTS     EKG: All EKG's are interpreted by the Emergency Department Physician who either signs or Co-signs this chart in the absence of a cardiologistGianna Keene. Rate: 89 bpm  LA interval: 154 ms  QRS duration: 76 ms  QTc: 445 ms  P-R-T axes: 24, -70, 67    Compared to old EKG on 11-      RADIOLOGY: non-plain film images(s) such as CT, Ultrasound and MRI are read by the radiologist.    US DUP ABD PEL RETRO SCROT LIMITED   Final Result      No findings to suggest ovarian torsion.    Enlarged left ovary mill/mm3    Hemoglobin 13.3 12.0 - 16.0 gm/dl    Hematocrit 40.8 37.0 - 47.0 %    MCV 86.8 81.0 - 99.0 fL    MCH 28.3 26.0 - 33.0 pg    MCHC 32.6 32.2 - 35.5 gm/dl    RDW-CV 14.2 11.5 - 14.5 %    RDW-SD 44.9 35.0 - 45.0 fL    Platelets 670 349 - 286 thou/mm3    MPV 12.6 (H) 9.4 - 12.4 fL    Seg Neutrophils 73.0 %    Lymphocytes 16.9 %    Monocytes 9.5 %    Eosinophils 0.0 %    Basophils 0.3 %    Immature Granulocytes 0.3 %    Segs Absolute 2.4 1.8 - 7.7 thou/mm3    Lymphocytes # 0.6 (L) 1.0 - 4.8 thou/mm3    Monocytes # 0.3 (L) 0.4 - 1.3 thou/mm3    Eosinophils # 0.0 0.0 - 0.4 thou/mm3    Basophils # 0.0 0.0 - 0.1 thou/mm3    Immature Grans (Abs) 0.01 0.00 - 0.07 thou/mm3    nRBC 0 /100 wbc   Basic Metabolic Panel   Result Value Ref Range    Sodium 136 135 - 145 meq/L    Potassium 4.2 3.5 - 5.2 meq/L    Chloride 100 98 - 111 meq/L    CO2 22 (L) 23 - 33 meq/L    Glucose 80 70 - 108 mg/dL    BUN 28 (H) 7 - 22 mg/dL    CREATININE 2.8 (H) 0.4 - 1.2 mg/dL    Calcium 9.7 8.5 - 10.5 mg/dL   HCG Qualitative, Serum   Result Value Ref Range    Preg, Serum NEGATIVE NEGATIVE   Anion Gap   Result Value Ref Range    Anion Gap 14.0 8.0 - 16.0 meq/L   Glomerular Filtration Rate, Estimated   Result Value Ref Range    Est, Glom Filt Rate 22 (A) ml/min/1.73m2   Osmolality   Result Value Ref Range    Osmolality Calc 276.4 275.0 - 300 mOsmol/kg   EKG Chest Pain   Result Value Ref Range    Ventricular Rate 89 BPM    Atrial Rate 89 BPM    P-R Interval 154 ms    QRS Duration 76 ms    Q-T Interval 366 ms    QTc Calculation (Bazett) 445 ms    P Axis 24 degrees    R Axis -70 degrees    T Axis 67 degrees       EMERGENCY DEPARTMENT COURSE:   Vitals:    Vitals:    06/30/18 0206 06/30/18 0331 06/30/18 0557 06/30/18 0625   BP: (!) 133/95 (!) 128/96 (!) 146/101 (!) 132/90   Pulse: 95 74 67 68   Resp: 18 18 18 18   Temp: 97.8 °F (36.6 °C)   97.6 °F (36.4 °C)   TempSrc: Oral   Oral   SpO2: 99% 99% 99% 100%   Weight: 201 lb (91.2 kg)      Height: 5' scribe in my presence, and it accurately records my words and actions.     Dr. Connor Madison M.D 6/30/18 6:48 AM        Connor Madison MD  06/30/18 9877

## 2018-06-30 NOTE — PLAN OF CARE
Problem: Pain:  Goal: Pain level will decrease  Pain level will decrease   Outcome: Ongoing  Complains of left sided flank pain. Prn morphine given with some relief. Problem: Tissue Perfusion - Renal, Altered:  Goal: Serum creatinine will be within specified parameters  Serum creatinine will be within specified parameters  Outcome: Ongoing  Monitoring labs, creatinine 2.8 today. IV fluids infusing. Problem: Discharge Planning:  Goal: Participates in care planning  Participates in care planning  Outcome: Ongoing  Pt kept up to date on plan of care  Goal: Discharged to appropriate level of care  Discharged to appropriate level of care  Outcome: Ongoing  Plans on returning home at discharge. Problem: Cardiac Output - Decreased:  Goal: Hemodynamic stability will improve  Hemodynamic stability will improve  Outcome: Ongoing  Vital signs stable, no telemetry ordered    Problem: Skin Integrity - Impaired:  Goal: Will show no infection signs and symptoms  Will show no infection signs and symptoms  Outcome: Ongoing  No new skin breakdown noted. Pt turns and repositions self frequently. Comments: Care plan reviewed with patient. Patient verbalize understanding of the plan of care and contribute to goal setting.

## 2018-06-30 NOTE — ED NOTES
Pt reminded of need for a urine sample, pt verbalizes understanding and states that she will provide one as soon as possible     Prabhu Tay RN  06/30/18 [id number]

## 2018-06-30 NOTE — CONSULTS
Department of Gynecology  Consult Note  Date of Consult:  2018    Reason for Consult:  Abdominal pain, left ovarian cystic mass    CHIEF COMPLAINT:   Abdominal pain    History obtained from patient    HISTORY OF PRESENT ILLNESS:     The patient is a 53 y.o. female with significant past medical history of Renal disease and HTN who presents with Left ovarian cystic mass and abdominal pain. Pt states that the pain began about 2 days ago like menstrual cramps all lower abdomen and progressed to sharp left sided abdominal pain. Denies fever or chills. She had her normal menstrual cycle as expected. Last pap smear Nov or Dec of 2017 and was normal but +HPV. Recently tx for Trichomonas from PCP. She has had the same partner for approx 7years. She does admit to being non-compliant with her meds but states that she has recently restarted everything. She admits to cocaine use about 4 days ago and uses intermittently usually no more than once a month and had been clean for about 8months at one point. She does attend NA from time to time.     Past Medical History:        Diagnosis Date    Aneurysm ([address] 2017    Eczema     all her life    Hypertension     Thyroid disease     having thyroid removed 13     Past Surgical History:        Procedure Laterality Date     SECTION  86 87 90    HEEL SPUR SURGERY Right     SALPINGECTOMY      tubal pregnancy    THYROIDECTOMY, PARTIAL  2011    TUBAL LIGATION  2010    TUNNELED VENOUS PORT PLACEMENT Left     mediport in chest         meds:  Current Facility-Administered Medications:     0.9 % sodium chloride infusion, , Intravenous, Continuous, Darian Moss MD, Last Rate: 200 mL/hr at 18 1107    sodium chloride flush 0.9 % injection 10 mL, 10 mL, Intravenous, 2 times per day, Jannette Leggett MD    sodium chloride flush 0.9 % injection 10 mL, 10 mL, Intravenous, PRN, Jannette Leggett MD    magnesium hydroxide (MILK OF MAGNESIA) 400 MG/5ML suspension 30 mL, 30 mL, Oral, Daily PRN, Zoë Palacio MD    [facility]) injection 4 mg, 4 mg, Intravenous, Q4H PRN, Zoë Palacio MD    amLODIPine (NORVASC) tablet 10 mg, 10 mg, Oral, Daily, Zoë Palacio MD, 10 mg at 06/30/18 1107    atorvastatin (LIPITOR) tablet 20 mg, 20 mg, Oral, Daily, Zoë Palacio MD, 20 mg at 06/30/18 1107    aspirin EC tablet 81 mg, 81 mg, Oral, Daily, Zoë Palacio MD, 81 mg at 06/30/18 1107    buPROPion [facility]) extended release tablet 150 mg, 150 mg, Oral, Daily, Zoë Palacio MD, 150 mg at 06/30/18 1108    piperacillin-tazobactam (ZOSYN) 3.375 g in dextrose 5 % 50 mL IVPB extended infusion (mini-bag), 3.375 g, Intravenous, Q8H, Zoë Palacio MD, Last Rate: 12.5 mL/hr at 06/30/18 1108, 3.375 g at 06/30/18 1108    morphine injection 2 mg, 2 mg, Intravenous, Q3H PRN, Zoë Palacio MD, 2 mg at 06/30/18 1106    nicotine (NICODERM CQ) 14 MG/24HR 1 patch, 1 patch, Transdermal, Daily, Zoë Palacio MD, 1 patch at 06/30/18 1108    docusate sodium (COLACE) capsule 100 mg, 100 mg, Oral, BID, Zoë Palacio MD, 100 mg at 06/30/18 1107    [START ON 7/1/2018] polyethylene glycol (GLYCOLAX) packet 17 g, 17 g, Oral, Daily, Zoë Palacio MD    pantoprazole (PROTONIX) tablet 40 mg, 40 mg, Oral, QAM AC, Zoë Palacio MD, 40 mg at 06/30/18 1107    HYDROcodone-acetaminophen (NORCO) 5-325 MG per tablet 1 tablet, 1 tablet, Oral, Q6H PRN **OR** HYDROcodone-acetaminophen (NORCO) 5-325 MG per tablet 2 tablet, 2 tablet, Oral, Q6H PRN, Zoë Palacio MD    traMADol (ULTRAM) tablet 50 mg, 50 mg, Oral, Q6H PRN, Zoë Palacio MD       Allergies:  Fish-derived products     Social History:  TOBACCO:   reports that she has been smoking Cigarettes. She has a 8.25 pack-year smoking history. She has never used smokeless tobacco.  ETOH:   reports that she drinks alcohol. DRUGS:   reports that she uses drugs, including Marijuana.     Family History:   Family History   Problem Laxmi Fall MD

## 2018-06-30 NOTE — H&P
History & Physical        Patient:  Sekou Munson  YOB: 1970  MRN: [id number]   PCP: KORIN Leyva CNP         Acct: [de-identified]    Date of Admission: 2018  Date of Service: Patient seen and examined on 2018      Chief Complaint     Left sided and lower abdominal pain. History of Present Illness     The patient is a 53 y.o.  female with a history of essential hypertension who presented to the hospital with concerns for a 10 day history of  lower abdominal pain and sharp intermittent left  abdominal pain which she initially rated at 8/10 associated with symptoms of nausea, vomiting, abdominal pain and decreased oral intake. She also reported symptoms of alternating subjective warmth and chills, diaphoresis and dyspareunia, lightheadedness as well as shortness of breath with exertion, and more frequent urination. The patient otherwise denied any coughing, chest pain. She does use cocaine and her last use was approximately 3 to 4 days ago. On initial evaluation in the emergency room, the patient was hemodynamically stable. Her WBC was decreased to 3.3 and it has been up to 4.9 in the past. Her creatinine was significantly elevated from baseline of 1.4 in 2018 to 2.8 today. Her urinalysis demonstrated signs of dehydration and pre-renal azotemia with granular and hyaline casts and possible progression to ATN. A CT scan of the abdomen and pelvis incidentally demonstrated an enlarging adnexal cystic lesion. Subsequent transvaginal ultrasound did not show evidence for ovarian torsion but demonstrated an enlarged left ovary containing a complex 6.5 x 5.2 x 3.7 cm complicated cyst with mobile internal debris and a 1.7 x 1.6 x 1.5 cm left lateral mid uterine intramural fibroid.      The CT scan of the abdomen and pelvis also revealed moderate stool in the colon with mild colonic diverticulosis without signs of diverticulitis and punctuate calcifications in unemployed      Social History Main Topics    Smoking status: Current Every Day Smoker     Packs/day: 0.25     Years: 33.00     Types: Cigarettes    Smokeless tobacco: Never Used    Alcohol use Yes      Comment: social, occasionally    Drug use: Yes     Types: Marijuana    Sexual activity: Yes     Partners: Male     Other Topics Concern    None     Social History Narrative    None         TOBACCO:   reports that she has been smoking Cigarettes. She has a 8.25 pack-year smoking history. She has never used smokeless tobacco.  ETOH:   reports that she drinks alcohol. Review of systems     Pertinent positives as noted in the HPI. All other systems reviewed and negative. ROS      Physical examination     BP (!) 132/90   Pulse 68   Temp 97.6 °F (36.4 °C) (Oral)   Resp 18   Ht 5' 7\" (1.702 m)   Wt 201 lb (91.2 kg)   SpO2 100%   BMI 31.48 kg/m²     General appearance:  No apparent distress, appears stated age and cooperative. Obese. HEENT:  Normocephalic. Pupils equal, round, and reactive to light. Extraocular motion intact. Conjunctivae/corneas clear. Nose symmetric without evidence of discharge. Oral mucosa moist without erythema or exudate. Neck: Supple, with full range of motion. Cardiovascular:  Regular rate and rhythm with normal S1/S2 without murmurs, rubs or gallops. Respiratory:  Normal respiratory effort. Clear to auscultation, bilaterally without Rales/Wheezes/Rhonchi. Abdomen: Soft, diffuse tenderness to palpation pronounced at the left flank area and lower abdominal region. + bowel sounds. Non-distended but with increased abdominal girth. Musculoskeletal:  No clubbing, cyanosis or edema bilaterally. Full range of motion without deformity. Neurologic:  No focal sensory/motor deficits. Cranial nerves: II-XII intact, grossly non-focal.  Lymphatic: No cervical lymphadenopathy. Psychiatric:  Alert and oriented. Normal judgement.  Conversational.  Vascular: Dorsalis pedis and Date: 6/30/2018  PROCEDURE: US NON OB TRANSVAGINAL, US DUP ABD PEL RETRO SCROT LIMITED CLINICAL INFORMATION: Possible ovarian cyst rule out torsion. COMPARISON: Pelvic ultrasound dated 7/21/2009 TECHNIQUE: Transvaginal pelvic ultrasound was performed. Color flow and spectral Doppler ultrasound of the ovaries were performed given the concern for ovarian torsion. FINDINGS: Transvaginal: Uterus:   Position: anteverted   Size: Normal, measures 9.2 x 5.9 x 5.4 cm. Echogenicity/morphology: Heterogeneous   Fibroids: There is a 1.7 x 1.6 x 1.5 cm intramural fibroid in the left lateral mid uterine body. Cervix: There are subcentimeter nabothian cysts in the cervix. Endometrium: normal thickness measuring 9.3 mm Ovaries/adnexa:   RIGHT ovary: Normal size measuring 4.3 x 1.7 x 1.6 cm   LEFT ovary: Enlarged measuring 7.2 x 6.2 x 5.1 cm   Mass/cyst: There is a 6.5 x 5.2 x 3.7 cm complicated left ovarian cyst with mobile internal debris. There is a 1.6 x 1 x 1.1 cm dominant follicle in the right ovary. Blood flow: There is arterial and venous blood flow in both ovaries and there are no findings to suggest ovarian torsion. Free fluid: none Urinary bladder: Not visualized. No findings to suggest ovarian torsion. Enlarged left ovary containing a 6.5 x 5.2 x 3.7 cm complicated cyst with mobile internal debris. 1.7 x 1.6 x 1.5 cm left lateral mid uterine intramural fibroid. **This report has been created using voice recognition software. It may contain minor errors which are inherent in voice recognition technology. ** Final report electronically signed by Dr. Karla Wesley on 6/30/2018 6:09 AM    Us Dup Abd Pel Retro Scrot Limited    Result Date: 6/30/2018  PROCEDURE: US NON OB TRANSVAGINAL, US DUP ABD PEL RETRO SCROT LIMITED CLINICAL INFORMATION: Possible ovarian cyst rule out torsion. COMPARISON: Pelvic ultrasound dated 7/21/2009 TECHNIQUE: Transvaginal pelvic ultrasound was performed.  Color flow and spectral Doppler hypertension  2. Obesity  3. Polysubstance abuse including cocaine and marijuana  4. Tobacco depndence  5. Hypothyroidism s/p thyroidectomy    PLAN:  1. Generous hydration with IV fluids at 200 ml/hr. Repeat BMP at 1500 and re-adjust fluids accordingly. Continue to monitor volume status with daily weights. 2. Renal ultrasound to check for signs of obstruction. 3. Urine studies including creatinine and sodium. 4. Consider Nephrology consult if creatinine does not continue to trend downwards with fluids. 5. OB/GYN, Dr. Lilly Rodriguez consulted for evaluation of left ovarian cyst and for continued monitoring of intrauterine fibroid as well as dyspareunia. 6. Zosyn ordered pending OB/GYN evaluation secondary to the presence of pain and leukopenia. Unclear if patient is infected at the moment, however Zosyn emprically ordered. Blood cultures and lactic acid ordered. Will monitor WBC to determine if antibiotics should be continued. 7. Urine chlamydia and gonorrhea  8. Check LFTs and lipase  9. Nicotine patch ordered. 10. Docusate/ Miralax for constipation  11. Protonix for stomach thickening. Gastroenterology to be consulted prior to discharge. Will defer consultation for now. DVT prophylaxis: [x] Lovenox deferred pending OB/GYN evaluation                                 [x] SCDs                                 [] SQ Heparin                                 [] Encourage ambulation           [] Already on Anticoagulation    Disposition:    [x] Home       [] TCU       [] Rehab       [] Psych       [] SNF       [] [place]       [] Other-    PT/OT Eval Status: None needed    Code Status: Full Code    Admitted to Inpatient with expected LOS greater than two midnights due to medical therapy. Thank you KORIN Florez CNP for the opportunity to be involved in this patient's care.     Electronically signed by Brittany Bonilla MD on 6/30/2018 at 9:10 AM

## 2018-07-01 LAB
ALBUMIN SERPL-MCNC: 3.2 G/DL (ref 3.5–5.1)
ANION GAP SERPL CALCULATED.3IONS-SCNC: 11 MEQ/L (ref 8–16)
BUN BLDV-MCNC: 19 MG/DL (ref 7–22)
CA 125: 6 U/ML
CALCIUM SERPL-MCNC: 8.2 MG/DL (ref 8.5–10.5)
CHLORIDE BLD-SCNC: 107 MEQ/L (ref 98–111)
CO2: 18 MEQ/L (ref 23–33)
CREAT SERPL-MCNC: 1.7 MG/DL (ref 0.4–1.2)
ERYTHROCYTE [DISTWIDTH] IN BLOOD BY AUTOMATED COUNT: 14.4 % (ref 11.5–14.5)
ERYTHROCYTE [DISTWIDTH] IN BLOOD BY AUTOMATED COUNT: 48.3 FL (ref 35–45)
GFR SERPL CREATININE-BSD FRML MDRD: 39 ML/MIN/1.73M2
GLUCOSE BLD-MCNC: 82 MG/DL (ref 70–108)
HCT VFR BLD CALC: 37.4 % (ref 37–47)
HEMOGLOBIN: 11.5 GM/DL (ref 12–16)
MCH RBC QN AUTO: 28.1 PG (ref 26–33)
MCHC RBC AUTO-ENTMCNC: 30.7 GM/DL (ref 32.2–35.5)
MCV RBC AUTO: 91.4 FL (ref 81–99)
ORGANISM: ABNORMAL
PHOSPHORUS: 3.5 MG/DL (ref 2.4–4.7)
PLATELET # BLD: 138 THOU/MM3 (ref 130–400)
PMV BLD AUTO: 12.7 FL (ref 9.4–12.4)
POTASSIUM SERPL-SCNC: 4.6 MEQ/L (ref 3.5–5.2)
RBC # BLD: 4.09 MILL/MM3 (ref 4.2–5.4)
SODIUM BLD-SCNC: 136 MEQ/L (ref 135–145)
URINE CULTURE REFLEX: ABNORMAL
WBC # BLD: 3 THOU/MM3 (ref 4.8–10.8)

## 2018-07-01 PROCEDURE — 1200000000 HC SEMI PRIVATE

## 2018-07-01 PROCEDURE — 6360000002 HC RX W HCPCS: Performed by: INTERNAL MEDICINE

## 2018-07-01 PROCEDURE — 99233 SBSQ HOSP IP/OBS HIGH 50: CPT | Performed by: INTERNAL MEDICINE

## 2018-07-01 PROCEDURE — 99152 MOD SED SAME PHYS/QHP 5/>YRS: CPT | Performed by: INTERNAL MEDICINE

## 2018-07-01 PROCEDURE — 36415 COLL VENOUS BLD VENIPUNCTURE: CPT

## 2018-07-01 PROCEDURE — 2580000003 HC RX 258: Performed by: INTERNAL MEDICINE

## 2018-07-01 PROCEDURE — 88305 TISSUE EXAM BY PATHOLOGIST: CPT

## 2018-07-01 PROCEDURE — 85027 COMPLETE CBC AUTOMATED: CPT

## 2018-07-01 PROCEDURE — 3609012400 HC EGD TRANSORAL BIOPSY SINGLE/MULTIPLE: Performed by: INTERNAL MEDICINE

## 2018-07-01 PROCEDURE — 0DB68ZX EXCISION OF STOMACH, VIA NATURAL OR ARTIFICIAL OPENING ENDOSCOPIC, DIAGNOSTIC: ICD-10-PCS | Performed by: INTERNAL MEDICINE

## 2018-07-01 PROCEDURE — 6370000000 HC RX 637 (ALT 250 FOR IP): Performed by: INTERNAL MEDICINE

## 2018-07-01 PROCEDURE — 80069 RENAL FUNCTION PANEL: CPT

## 2018-07-01 RX ORDER — FENTANYL CITRATE 50 UG/ML
INJECTION, SOLUTION INTRAMUSCULAR; INTRAVENOUS PRN
Status: DISCONTINUED | OUTPATIENT
Start: 2018-07-01 | End: 2018-07-01 | Stop reason: HOSPADM

## 2018-07-01 RX ORDER — MIDAZOLAM HYDROCHLORIDE 1 MG/ML
INJECTION INTRAMUSCULAR; INTRAVENOUS PRN
Status: DISCONTINUED | OUTPATIENT
Start: 2018-07-01 | End: 2018-07-01 | Stop reason: HOSPADM

## 2018-07-01 RX ADMIN — BUPROPION HYDROCHLORIDE 150 MG: 150 TABLET, EXTENDED RELEASE ORAL at 08:50

## 2018-07-01 RX ADMIN — HYDROCODONE BITARTRATE AND ACETAMINOPHEN 2 TABLET: 5; 325 TABLET ORAL at 21:05

## 2018-07-01 RX ADMIN — PIPERACILLIN SODIUM AND TAZOBACTAM SODIUM 3.38 G: 3; .375 INJECTION, POWDER, LYOPHILIZED, FOR SOLUTION INTRAVENOUS at 13:44

## 2018-07-01 RX ADMIN — HYDROCODONE BITARTRATE AND ACETAMINOPHEN 2 TABLET: 5; 325 TABLET ORAL at 13:58

## 2018-07-01 RX ADMIN — PIPERACILLIN SODIUM AND TAZOBACTAM SODIUM 3.38 G: 3; .375 INJECTION, POWDER, LYOPHILIZED, FOR SOLUTION INTRAVENOUS at 20:12

## 2018-07-01 RX ADMIN — DOCUSATE SODIUM 100 MG: 100 CAPSULE, LIQUID FILLED ORAL at 20:12

## 2018-07-01 RX ADMIN — SODIUM CHLORIDE: 9 INJECTION, SOLUTION INTRAVENOUS at 16:08

## 2018-07-01 RX ADMIN — ONDANSETRON 4 MG: 2 INJECTION INTRAMUSCULAR; INTRAVENOUS at 04:24

## 2018-07-01 RX ADMIN — ASPIRIN 81 MG: 81 TABLET ORAL at 08:50

## 2018-07-01 RX ADMIN — SODIUM CHLORIDE: 9 INJECTION, SOLUTION INTRAVENOUS at 23:37

## 2018-07-01 RX ADMIN — ONDANSETRON 4 MG: 2 INJECTION INTRAMUSCULAR; INTRAVENOUS at 08:55

## 2018-07-01 RX ADMIN — SODIUM CHLORIDE: 9 INJECTION, SOLUTION INTRAVENOUS at 04:23

## 2018-07-01 RX ADMIN — PIPERACILLIN SODIUM AND TAZOBACTAM SODIUM 3.38 G: 3; .375 INJECTION, POWDER, LYOPHILIZED, FOR SOLUTION INTRAVENOUS at 04:23

## 2018-07-01 RX ADMIN — ATORVASTATIN CALCIUM 20 MG: 20 TABLET, FILM COATED ORAL at 08:50

## 2018-07-01 RX ADMIN — AMLODIPINE BESYLATE 10 MG: 10 TABLET ORAL at 08:50

## 2018-07-01 ASSESSMENT — PAIN SCALES - GENERAL
PAINLEVEL_OUTOF10: 7
PAINLEVEL_OUTOF10: 7
PAINLEVEL_OUTOF10: 0
PAINLEVEL_OUTOF10: 2
PAINLEVEL_OUTOF10: 0
PAINLEVEL_OUTOF10: 4

## 2018-07-01 ASSESSMENT — PAIN DESCRIPTION - LOCATION: LOCATION: ABDOMEN

## 2018-07-01 ASSESSMENT — PAIN DESCRIPTION - ORIENTATION: ORIENTATION: LEFT

## 2018-07-01 ASSESSMENT — PAIN DESCRIPTION - PAIN TYPE: TYPE: ACUTE PAIN

## 2018-07-01 NOTE — PLAN OF CARE
Problem: Pain:  Goal: Pain level will decrease  Pain level will decrease   Outcome: Ongoing  Pt continues to have pain, will continue to assess. Receiving 1-2 tablets norco.     Problem: Tissue Perfusion - Renal, Altered:  Goal: Serum creatinine will be within specified parameters  Serum creatinine will be within specified parameters   Outcome: Ongoing  Creatinine remained elevated, will continue to assess with morning labs for improvement. Problem: Discharge Planning:  Goal: Participates in care planning  Participates in care planning   Outcome: Ongoing  Pt is active in plan of care, will continue to update as needed. Goal: Discharged to appropriate level of care  Discharged to appropriate level of care   Outcome: Ongoing  Pt plans to return home upon discharge with significant other. Problem: Cardiac Output - Decreased:  Goal: Hemodynamic stability will improve  Hemodynamic stability will improve   Outcome: Ongoing  Pt remains hemodynamically stable at this time, continue to assess. Refer to flowsheet. Problem: Skin Integrity - Impaired:  Goal: Will show no infection signs and symptoms  Will show no infection signs and symptoms   Outcome: Ongoing  Pt receiving IV zosyn, will continue to assess for increased signs of infection. Goal: Absence of new skin breakdown  Absence of new skin breakdown   Outcome: Ongoing  No skin breakdown at this time, continue to assess and encourage frequent turning. Comments: Care plan reviewed with patient. Patient verbalize understanding of the plan of care and contribute to goal setting.

## 2018-07-01 NOTE — PROGRESS NOTES
6/30/2018 6:09 AM      US NON OB TRANSVAGINAL   Final Result      No findings to suggest ovarian torsion. Enlarged left ovary containing a 6.5 x 5.2 x 3.7 cm complicated cyst with mobile internal debris. 1.7 x 1.6 x 1.5 cm left lateral mid uterine intramural fibroid. **This report has been created using voice recognition software. It may contain minor errors which are inherent in voice recognition technology. **       Final report electronically signed by Dr. Bessie John on 6/30/2018 6:09 AM      CT ABDOMEN PELVIS WO CONTRAST   Final Result   Thickening of the wall of the stomach, see discussion and recommendations above. Mild colonic diverticulosis without diverticulitis. Moderate stool in the colon. No hydroureteronephrosis. Punctate calcifications in both kidneys which may represent nephrocalcinosis rather than nonobstructing calculi. Enlarging left adnexal cystic lesion, consider nonemergent pelvic ultrasound for further evaluation. Stable small pericardial effusion. **This report has been created using voice recognition software. It may contain minor errors which are inherent in voice recognition technology. **      Final report electronically signed by Dr. Bessie John on 6/30/2018 4:15 AM        Ct Abdomen Pelvis Wo Contrast    Result Date: 6/30/2018  PROCEDURE: CT ABDOMEN PELVIS WO CONTRAST CLINICAL INFORMATION: Left flank pain . COMPARISON: Noncontrast abdomen pelvis CT dated 1/4/2018 TECHNIQUE: 5 mm images were obtained through the abdomen and pelvis. Sagittal and coronal reconstructions were obtained and reviewed. No oral or IV contrast was administered. All CT scans at this facility use dose modulation, iterative reconstruction, and/or weight-based dosing when appropriate to reduce radiation dose to as low as reasonably achievable.  FINDINGS: Limitations: Evaluation of the solid and hollow viscera is limited due to the lack of IV contrast. Lower chest: There is mild may represent nephrocalcinosis rather than nonobstructing calculi. Enlarging left adnexal cystic lesion, consider nonemergent pelvic ultrasound for further evaluation. Stable small pericardial effusion. **This report has been created using voice recognition software. It may contain minor errors which are inherent in voice recognition technology. ** Final report electronically signed by Dr. Grace Decker on 6/30/2018 4:15 AM    Us Renal Complete    Result Date: 6/30/2018  PROCEDURE: US RENAL COMPLETE CLINICAL INFORMATION: Abdominal pain TECHNIQUE: Ultrasound of the kidneys and urinary bladder was performed. Grayscale and color images were obtained. COMPARISON: Renal ultrasound 12/15/2012 FINDINGS: The right kidney measures 9.0 x 5.2 x 4.7 cm and the left kidney measures 10.3 x 3.9 x 5.1 cm. Renal cortical thickness and echogenicity are normal bilaterally. There is no evidence of hydronephrosis, calculi or intrarenal mass on either side. Color Doppler demonstrates spectral waveforms in the bilateral renal arteries. Arcuate resistive indices are normal bilaterally. The distended urinary bladder is unremarkable. The patient did not void at the conclusion of the study. 1. Normal bilateral renal ultrasound. 2. Unremarkable urinary bladder. Final report electronically signed by Dr. Chuck Guzman on 6/30/2018 11:09 AM    Us Non Ob Transvaginal    Result Date: 6/30/2018  PROCEDURE: US NON OB TRANSVAGINAL, US DUP ABD PEL RETRO SCROT LIMITED CLINICAL INFORMATION: Possible ovarian cyst rule out torsion. COMPARISON: Pelvic ultrasound dated 7/21/2009 TECHNIQUE: Transvaginal pelvic ultrasound was performed. Color flow and spectral Doppler ultrasound of the ovaries were performed given the concern for ovarian torsion. FINDINGS: Transvaginal: Uterus:   Position: anteverted   Size: Normal, measures 9.2 x 5.9 x 5.4 cm. Echogenicity/morphology: Heterogeneous   Fibroids:  There is a 1.7 x 1.6 x 1.5 cm intramural fibroid in the left lateral mid uterine body. Cervix: There are subcentimeter nabothian cysts in the cervix. Endometrium: normal thickness measuring 9.3 mm Ovaries/adnexa:   RIGHT ovary: Normal size measuring 4.3 x 1.7 x 1.6 cm   LEFT ovary: Enlarged measuring 7.2 x 6.2 x 5.1 cm   Mass/cyst: There is a 6.5 x 5.2 x 3.7 cm complicated left ovarian cyst with mobile internal debris. There is a 1.6 x 1 x 1.1 cm dominant follicle in the right ovary. Blood flow: There is arterial and venous blood flow in both ovaries and there are no findings to suggest ovarian torsion. Free fluid: none Urinary bladder: Not visualized. No findings to suggest ovarian torsion. Enlarged left ovary containing a 6.5 x 5.2 x 3.7 cm complicated cyst with mobile internal debris. 1.7 x 1.6 x 1.5 cm left lateral mid uterine intramural fibroid. **This report has been created using voice recognition software. It may contain minor errors which are inherent in voice recognition technology. ** Final report electronically signed by Dr. Salvatore Ward on 6/30/2018 6:09 AM    Us Dup Abd Pel Retro Scrot Limited    Result Date: 6/30/2018  PROCEDURE: US NON OB TRANSVAGINAL, US DUP ABD PEL RETRO SCROT LIMITED CLINICAL INFORMATION: Possible ovarian cyst rule out torsion. COMPARISON: Pelvic ultrasound dated 7/21/2009 TECHNIQUE: Transvaginal pelvic ultrasound was performed. Color flow and spectral Doppler ultrasound of the ovaries were performed given the concern for ovarian torsion. FINDINGS: Transvaginal: Uterus:   Position: anteverted   Size: Normal, measures 9.2 x 5.9 x 5.4 cm. Echogenicity/morphology: Heterogeneous   Fibroids: There is a 1.7 x 1.6 x 1.5 cm intramural fibroid in the left lateral mid uterine body. Cervix: There are subcentimeter nabothian cysts in the cervix.    Endometrium: normal thickness measuring 9.3 mm Ovaries/adnexa:   RIGHT ovary: Normal size measuring 4.3 x 1.7 x 1.6 cm   LEFT ovary: Enlarged measuring 7.2 x 6.2 x 5.1 cm   Mass/cyst: There is a 6.5 x 5.2 x 3.7 cm complicated left ovarian cyst with mobile internal debris. There is a 1.6 x 1 x 1.1 cm dominant follicle in the right ovary. Blood flow: There is arterial and venous blood flow in both ovaries and there are no findings to suggest ovarian torsion. Free fluid: none Urinary bladder: Not visualized. No findings to suggest ovarian torsion. Enlarged left ovary containing a 6.5 x 5.2 x 3.7 cm complicated cyst with mobile internal debris. 1.7 x 1.6 x 1.5 cm left lateral mid uterine intramural fibroid. **This report has been created using voice recognition software. It may contain minor errors which are inherent in voice recognition technology. ** Final report electronically signed by Dr. Hilary Cruz on 6/30/2018 6:09 AM          Assessment/Plan:    Active Problems:    TERESA (acute kidney injury) ([facility] [address]    Abdominal pain  Resolved Problems:    * No resolved hospital problems. *      ASSESSMENT:  1. Acute kidney injury on chronic kidney disease stage III with signs of pre-renal azotemia with progression to ATN in setting of decreased oral intake. 2. Left sided and lower abdominal pain with findings of a complex left ovarian cyst with internal debris  3. Left mid uterine intramural fibroid. 4. Dyspareunia. 5. Constipation. 6. Thickening of the stomach of unclear significance  7. Chronic co-morbidities  1. Essential hypertension  2. Obesity  3. Polysubstance abuse including cocaine and marijuana  4. Tobacco depndence  5. Hypothyroidism s/p thyroidectomy    PLAN:  1. Continue IV fluids for now will check BMP tomorrow she is almost back to her baseline with creatinine   2. Renal ultrasound to check for signs of obstruction. 3. Urine studies including creatinine and sodium. 4. Seen by a OB GYN for ovarian mass will follow up as an outpatient in the clinic.- Infection per the recommendation    5.  GERD status post endoscopy on 07/01/2018 -has gastritis and sliding hiatal hernia will continue Protonix for now seen by GI     Code Status: Full Code      Marc Manzano MD

## 2018-07-01 NOTE — CONSULTS
the cervix. Endometrium: normal thickness measuring 9.3 mm Ovaries/adnexa:   RIGHT ovary: Normal size measuring 4.3 x 1.7 x 1.6 cm   LEFT ovary: Enlarged measuring 7.2 x 6.2 x 5.1 cm   Mass/cyst: There is a 6.5 x 5.2 x 3.7 cm complicated left ovarian cyst with mobile internal debris. There is a 1.6 x 1 x 1.1 cm dominant follicle in the right ovary. Blood flow: There is arterial and venous blood flow in both ovaries and there are no findings to suggest ovarian torsion. Free fluid: none Urinary bladder: Not visualized. No findings to suggest ovarian torsion. Enlarged left ovary containing a 6.5 x 5.2 x 3.7 cm complicated cyst with mobile internal debris. 1.7 x 1.6 x 1.5 cm left lateral mid uterine intramural fibroid. **This report has been created using voice recognition software. It may contain minor errors which are inherent in voice recognition technology. ** Final report electronically signed by Dr. Jose Hanson on 6/30/2018 6:09 AM    Us Dup Abd Pel Retro Scrot Limited    Result Date: 6/30/2018  PROCEDURE: US NON OB TRANSVAGINAL, US DUP ABD PEL RETRO SCROT LIMITED CLINICAL INFORMATION: Possible ovarian cyst rule out torsion. COMPARISON: Pelvic ultrasound dated 7/21/2009 TECHNIQUE: Transvaginal pelvic ultrasound was performed. Color flow and spectral Doppler ultrasound of the ovaries were performed given the concern for ovarian torsion. FINDINGS: Transvaginal: Uterus:   Position: anteverted   Size: Normal, measures 9.2 x 5.9 x 5.4 cm. Echogenicity/morphology: Heterogeneous   Fibroids: There is a 1.7 x 1.6 x 1.5 cm intramural fibroid in the left lateral mid uterine body. Cervix: There are subcentimeter nabothian cysts in the cervix. Endometrium: normal thickness measuring 9.3 mm Ovaries/adnexa:   RIGHT ovary: Normal size measuring 4.3 x 1.7 x 1.6 cm   LEFT ovary: Enlarged measuring 7.2 x 6.2 x 5.1 cm   Mass/cyst: There is a 6.5 x 5.2 x 3.7 cm complicated left ovarian cyst with mobile internal debris. There is a 1.6 x 1 x 1.1 cm dominant follicle in the right ovary. Blood flow: There is arterial and venous blood flow in both ovaries and there are no findings to suggest ovarian torsion. Free fluid: none Urinary bladder: Not visualized. No findings to suggest ovarian torsion. Enlarged left ovary containing a 6.5 x 5.2 x 3.7 cm complicated cyst with mobile internal debris. 1.7 x 1.6 x 1.5 cm left lateral mid uterine intramural fibroid. **This report has been created using voice recognition software. It may contain minor errors which are inherent in voice recognition technology. ** Final report electronically signed by Dr. Danette Aguirre on 6/30/2018 6:09 AM      Impression and plan : Abdominal pain , thick wall stomach on CT scan , EGD today . Patient Active Problem List   Diagnosis    Hypertension    Eczema    TERESA (acute kidney injury) ([address]    Dyspnea    Obesity    History of URI (upper respiratory infection)    Hyponatremia    Nicotine dependence    Angina, class II ([address]    Abdominal pain           PLANNED PROCEDURE   [x]EGD  []Colonoscopy []Flex Sigmoid      Thanks for consultation .     Juvenal Khan MD, MS  Electronically signed 7/1/2018 at 10:49 AM

## 2018-07-01 NOTE — OP NOTE
Sedation/Analgesia History & Physical    Patient: Sherryle Newport : 1970  Med Rec#: [id number] Acc#: [id number]   Provider Performing Procedure: Osiris Boateng  Primary Care Physician: KORIN Palacios CNP    PRE-PROCEDURE   Full CODE [x]Yes  DNR-CCA/DNR-CC []Yes   Brief History/Pre-Procedure Diagnosis:abdominal pain , thick wall stomach on CT scan . MEDICAL HISTORY         has a past medical history of Aneurysm ([address] Eczema; Hypertension; and Thyroid disease. SURGICAL HISTORY   has a past surgical history that includes  section (86 [phone] 90); salpingectomy; Tubal ligation (); Tunneled venous port placement (Left, ); Thyroidectomy, partial (); and Heel spur surgery (Right).   Additional information:       ALLERGIES   Allergies as of 2018 - Review Complete 2018   Allergen Reaction Noted    Fish-derived products Itching 2017     Additional information:       MEDICATIONS   Coumadin Use Last 7 Days [x]No []Yes  Antiplatelet drug therapy use last 7 days  [x]No []Yes  Other anticoagulant use last 7 days  [x]No []Yes    Current Facility-Administered Medications:     fentaNYL (SUBLIMAZE) injection, , , PRN, Roddy Fields MD, 50 mcg at 18 1030    midazolam (VERSED) injection, , , PRN, Roddy Fields MD, 2 mg at 18 1031    0.9 % sodium chloride infusion, , Intravenous, Continuous, Conrado Littlejohn MD, Last Rate: 125 mL/hr at 18 0423    sodium chloride flush 0.9 % injection 10 mL, 10 mL, Intravenous, 2 times per day, Conrado Littlejohn MD    sodium chloride flush 0.9 % injection 10 mL, 10 mL, Intravenous, PRN, Conardo Littlejohn MD    magnesium hydroxide (MILK OF MAGNESIA) 400 MG/5ML suspension 30 mL, 30 mL, Oral, Daily PRN, Conrado Littlejohn MD    ondansetron [facility]) injection 4 mg, 4 mg, Intravenous, Q4H PRN, Conrado Littlejohn MD, 4 mg at 18 0855    amLODIPine (NORVASC) tablet 10 mg, 10 mg, Oral, Daily, Conrado Littlejohn MD, 10 mg at 18 18 100 % -   06/30/18 1141 (!) 143/106 97.5 °F (36.4 °C) Oral 61 18 95 % -   06/30/18 1055 130/80 97.4 °F (36.3 °C) Oral 60 18 96 % -       PLANNED PROCEDURE   [x]EGD  []Colonoscopy []Flex Sigmoid     Consent: I have discussed with the patient and/or the patient representative the indication, alternatives, and the possible risks and/or complications of the planned procedure and the anesthesia methods. The patient and/or patient representative appear to understand and agree to proceed. SEDATION  Planned agent:[x]Midazolam []Meperidine [x]Sublimaze []Morphine  []Diazepam  []Propofol   ASA Classification: Class 3 - A patient with severe systemic disease that limits activity but is not incapacitating    Monitoring and Safety: The patient will be placed on a cardiac monitor and vital signs, pulse oximetry and level of consciousness will be continuously evaluated throughout the procedure. The patient will be closely monitored until recovery from the medications is complete and the patient has returned to baseline status. Respiratory therapy will be on standby during the procedure. [x]Pre-procedure diagnostic studies complete and results available. Comment:    [x]Previous sedation/anesthesia experiences assessed. Comment:    [x]The patient is an appropriate candidate to undergo the planned procedure sedation and anesthesia. (Refer to nursing sedation/analgesia documentation record)  [x]Formulation and discussion of sedation/procedure plan, risks, and expectations with patient and/or responsible adult completed. [x]Patient examined immediately prior to the procedure.  (Refer to nursing sedation/analgesia documentation record)    Giorgi Chung MD   Electronically signed 7/1/2018

## 2018-07-01 NOTE — PLAN OF CARE
Problem: Pain:  Goal: Control of acute pain  Control of acute pain   Outcome: Ongoing  No pain noted this shift, will continue to monitor  Goal: Control of chronic pain  Control of chronic pain   Outcome: Ongoing  No pain noted this shift, will continue to monitor     Problem: Tissue Perfusion - Renal, Altered:  Goal: Electrolytes within specified parameters  Electrolytes within specified parameters   Outcome: Ongoing  WNL   Goal: Serum creatinine will be within specified parameters  Serum creatinine will be within specified parameters   Outcome: Ongoing  Cr 1.7, decreased from yesterday. Goal: Ability to achieve a balanced intake and output will improve  Ability to achieve a balanced intake and output will improve   Outcome: Ongoing  VSS, patient NPO but having adequate output     Problem: Discharge Planning:  Goal: Participates in care planning  Participates in care planning   Outcome: Ongoing  Patient aware of plan of care. Goal: Discharged to appropriate level of care  Discharged to appropriate level of care   Outcome: Ongoing  Discharged to home with significant other. Problem: Cardiac Output - Decreased:  Goal: Hemodynamic stability will improve  Hemodynamic stability will improve   Outcome: Ongoing  VSS    Problem: Skin Integrity - Impaired:  Goal: Will show no infection signs and symptoms  Will show no infection signs and symptoms   Outcome: Ongoing  VSS, no fever noted   Goal: Absence of new skin breakdown  Absence of new skin breakdown   Outcome: Ongoing  No new signs or symptoms of skin breakdown noted this shift, encouraging patient to turn and reposition self in bed q2h      Comments: Care plan reviewed with patient and significant other. Patient and significant other verbalize understanding of the plan of care and contribute to goal setting.

## 2018-07-01 NOTE — PROGRESS NOTES
EGD showed 2 cm sliding hiatal hernia , and erosive gastritis biopsies taken , will advance to soft diet follow reflux precaution , po Protonix , follow biopsies results  , and GI follow up in 1 month .

## 2018-07-02 VITALS
HEIGHT: 67 IN | RESPIRATION RATE: 18 BRPM | BODY MASS INDEX: 31.08 KG/M2 | HEART RATE: 68 BPM | DIASTOLIC BLOOD PRESSURE: 93 MMHG | WEIGHT: 198 LBS | SYSTOLIC BLOOD PRESSURE: 135 MMHG | TEMPERATURE: 97.7 F | OXYGEN SATURATION: 100 %

## 2018-07-02 LAB
ANION GAP SERPL CALCULATED.3IONS-SCNC: 9 MEQ/L (ref 8–16)
BUN BLDV-MCNC: 10 MG/DL (ref 7–22)
CALCIUM SERPL-MCNC: 8.5 MG/DL (ref 8.5–10.5)
CHLORIDE BLD-SCNC: 104 MEQ/L (ref 98–111)
CO2: 23 MEQ/L (ref 23–33)
CREAT SERPL-MCNC: 1.5 MG/DL (ref 0.4–1.2)
ERYTHROCYTE [DISTWIDTH] IN BLOOD BY AUTOMATED COUNT: 14.2 % (ref 11.5–14.5)
ERYTHROCYTE [DISTWIDTH] IN BLOOD BY AUTOMATED COUNT: 47.8 FL (ref 35–45)
GFR SERPL CREATININE-BSD FRML MDRD: 45 ML/MIN/1.73M2
GLUCOSE BLD-MCNC: 133 MG/DL (ref 70–108)
HCT VFR BLD CALC: 37.8 % (ref 37–47)
HEMOGLOBIN: 11.5 GM/DL (ref 12–16)
MCH RBC QN AUTO: 28 PG (ref 26–33)
MCHC RBC AUTO-ENTMCNC: 30.4 GM/DL (ref 32.2–35.5)
MCV RBC AUTO: 92 FL (ref 81–99)
PLATELET # BLD: 133 THOU/MM3 (ref 130–400)
PMV BLD AUTO: 12.7 FL (ref 9.4–12.4)
POTASSIUM SERPL-SCNC: 3.8 MEQ/L (ref 3.5–5.2)
RBC # BLD: 4.11 MILL/MM3 (ref 4.2–5.4)
SODIUM BLD-SCNC: 136 MEQ/L (ref 135–145)
WBC # BLD: 2.1 THOU/MM3 (ref 4.8–10.8)

## 2018-07-02 PROCEDURE — 36415 COLL VENOUS BLD VENIPUNCTURE: CPT

## 2018-07-02 PROCEDURE — 6360000002 HC RX W HCPCS: Performed by: INTERNAL MEDICINE

## 2018-07-02 PROCEDURE — 2580000003 HC RX 258: Performed by: INTERNAL MEDICINE

## 2018-07-02 PROCEDURE — 6370000000 HC RX 637 (ALT 250 FOR IP): Performed by: INTERNAL MEDICINE

## 2018-07-02 PROCEDURE — 80048 BASIC METABOLIC PNL TOTAL CA: CPT

## 2018-07-02 PROCEDURE — 85027 COMPLETE CBC AUTOMATED: CPT

## 2018-07-02 PROCEDURE — 99239 HOSP IP/OBS DSCHRG MGMT >30: CPT | Performed by: INTERNAL MEDICINE

## 2018-07-02 RX ORDER — HYDROCODONE BITARTRATE AND ACETAMINOPHEN 5; 325 MG/1; MG/1
1 TABLET ORAL EVERY 6 HOURS PRN
Qty: 5 TABLET | Refills: 0 | Status: SHIPPED | OUTPATIENT
Start: 2018-07-02 | End: 2018-07-09

## 2018-07-02 RX ORDER — PANTOPRAZOLE SODIUM 40 MG/1
40 TABLET, DELAYED RELEASE ORAL
Qty: 30 TABLET | Refills: 3 | Status: SHIPPED | OUTPATIENT
Start: 2018-07-03 | End: 2019-04-29 | Stop reason: ALTCHOICE

## 2018-07-02 RX ADMIN — ATORVASTATIN CALCIUM 20 MG: 20 TABLET, FILM COATED ORAL at 10:32

## 2018-07-02 RX ADMIN — MORPHINE SULFATE 2 MG: 2 INJECTION, SOLUTION INTRAMUSCULAR; INTRAVENOUS at 10:36

## 2018-07-02 RX ADMIN — SODIUM CHLORIDE: 9 INJECTION, SOLUTION INTRAVENOUS at 08:00

## 2018-07-02 RX ADMIN — BUPROPION HYDROCHLORIDE 150 MG: 150 TABLET, EXTENDED RELEASE ORAL at 10:32

## 2018-07-02 RX ADMIN — HYDROCODONE BITARTRATE AND ACETAMINOPHEN 2 TABLET: 5; 325 TABLET ORAL at 08:00

## 2018-07-02 RX ADMIN — PANTOPRAZOLE SODIUM 40 MG: 40 TABLET, DELAYED RELEASE ORAL at 08:03

## 2018-07-02 RX ADMIN — ASPIRIN 81 MG: 81 TABLET ORAL at 10:35

## 2018-07-02 RX ADMIN — PIPERACILLIN SODIUM AND TAZOBACTAM SODIUM 3.38 G: 3; .375 INJECTION, POWDER, LYOPHILIZED, FOR SOLUTION INTRAVENOUS at 03:25

## 2018-07-02 RX ADMIN — AMLODIPINE BESYLATE 10 MG: 10 TABLET ORAL at 10:32

## 2018-07-02 RX ADMIN — ONDANSETRON 4 MG: 2 INJECTION INTRAMUSCULAR; INTRAVENOUS at 03:18

## 2018-07-02 ASSESSMENT — PAIN SCALES - GENERAL
PAINLEVEL_OUTOF10: 0
PAINLEVEL_OUTOF10: 7
PAINLEVEL_OUTOF10: 4
PAINLEVEL_OUTOF10: 4

## 2018-07-02 ASSESSMENT — PAIN DESCRIPTION - DESCRIPTORS: DESCRIPTORS: CRAMPING

## 2018-07-02 ASSESSMENT — PAIN DESCRIPTION - FREQUENCY: FREQUENCY: CONTINUOUS

## 2018-07-02 ASSESSMENT — PAIN DESCRIPTION - LOCATION: LOCATION: ABDOMEN;BACK

## 2018-07-02 ASSESSMENT — PAIN DESCRIPTION - PAIN TYPE: TYPE: ACUTE PAIN

## 2018-07-02 ASSESSMENT — PAIN DESCRIPTION - ORIENTATION: ORIENTATION: LEFT

## 2018-07-02 NOTE — CARE COORDINATION
other  Expected Discharge date:  07/02/18  Follow Up Appointment: Best Day/ Time: Tuesday PM    Discharge Plan: Spoke with Kellee Mcclure, plans home with s.o. Denies needs.       Evaluation: no

## 2018-07-02 NOTE — PROGRESS NOTES
Discharge teaching and instructions given for TERESA will be completed with patient using teachback method. Reviewed AVS. Printed prescriptions will be given to the patient. Patient will be asked for understanding regarding new prescriptions and follow up appointments and care of self at home. Awaiting ride for discharge to come.

## 2018-07-02 NOTE — PLAN OF CARE
Problem: Pain:  Goal: Control of acute pain  Control of acute pain   Outcome: Ongoing  Patient rated pain of 8 on scale 0-10. PRN pain medications given. Problem: Tissue Perfusion - Renal, Altered:  Goal: Serum creatinine will be within specified parameters  Serum creatinine will be within specified parameters   Outcome: Ongoing  Patients creatinine improving, was 1.7 this morning. Will recheck in the AM.     Problem: Discharge Planning:  Goal: Discharged to appropriate level of care  Discharged to appropriate level of care   Outcome: Ongoing  Patient plans to be discharged home with  when medically stable. Problem: Cardiac Output - Decreased:  Goal: Hemodynamic stability will improve  Hemodynamic stability will improve   Outcome: Ongoing  Vitals:    07/01/18 1057 07/01/18 1122 07/01/18 1550 07/01/18 1938   BP: 127/89 (!) 158/92 139/86 (!) 144/90   Pulse: 62 64 68 68   Resp: 16 16 18 18   Temp:  97.6 °F (36.4 °C) 97.9 °F (36.6 °C) 97.8 °F (36.6 °C)   TempSrc:  Oral Oral Oral   SpO2: 98% 94% 99% 99%   Weight:       Height:           Problem: Skin Integrity - Impaired:  Goal: Will show no infection signs and symptoms  Will show no infection signs and symptoms   Outcome: Ongoing  Patient afebrile. Patient on IV zosyn. Goal: Absence of new skin breakdown  Absence of new skin breakdown   Outcome: Ongoing  Patient has no new skin breakdown. Patient making frequent positional changes. Comments: Care plan reviewed with patient. Patient verbalize understanding of the plan of care and contribute to goal setting.

## 2018-07-02 NOTE — PROGRESS NOTES
Informed pt of normal  level today and to plan on appt in the office with me next week for surgical planning.

## 2018-07-03 NOTE — DISCHARGE SUMMARY
mid uterine body. Cervix: There are subcentimeter nabothian cysts in the cervix. Endometrium: normal thickness measuring 9.3 mm Ovaries/adnexa:   RIGHT ovary: Normal size measuring 4.3 x 1.7 x 1.6 cm   LEFT ovary: Enlarged measuring 7.2 x 6.2 x 5.1 cm   Mass/cyst: There is a 6.5 x 5.2 x 3.7 cm complicated left ovarian cyst with mobile internal debris. There is a 1.6 x 1 x 1.1 cm dominant follicle in the right ovary. Blood flow: There is arterial and venous blood flow in both ovaries and there are no findings to suggest ovarian torsion. Free fluid: none Urinary bladder: Not visualized. No findings to suggest ovarian torsion. Enlarged left ovary containing a 6.5 x 5.2 x 3.7 cm complicated cyst with mobile internal debris. 1.7 x 1.6 x 1.5 cm left lateral mid uterine intramural fibroid. **This report has been created using voice recognition software. It may contain minor errors which are inherent in voice recognition technology. ** Final report electronically signed by Dr. Matt Poe on 6/30/2018 6:09 AM          Assessment/Plan:    Active Problems:    TERESA (acute kidney injury) ([address]    Abdominal pain  Resolved Problems:    * No resolved hospital problems. *      ASSESSMENT:  1. Acute kidney injury on chronic kidney disease stage III with signs of pre-renal azotemia with progression to ATN in setting of decreased oral intake. 2. Left sided and lower abdominal pain with findings of a complex left ovarian cyst with internal debris  3. Left mid uterine intramural fibroid. 4. Dyspareunia. 5. Constipation. 6. Thickening of the stomach of unclear significance  7. Chronic co-morbidities  1. Essential hypertension  2. Obesity  3. Polysubstance abuse including cocaine and marijuana  4. Tobacco depndence  5. Hypothyroidism s/p thyroidectomy    PLAN:  1. Acute kidney injury resolved back to baseline of creatinine, she has chronic kidney disease stage II    2.  Seen by a OB GYN for ovarian mass will follow up as an outpatient in the clinic.-no Infection per the recommendation- we will stop Zosyn    3.  GERD status post endoscopy on 07/01/2018 -has gastritis and sliding hiatal hernia will continue Protonix for now seen by GI     Okay for home today doing well ,asymptomatic    Discharge time 35 minutes    Code Status: Prior      Yolanda Aguilar MD

## 2018-07-05 LAB
BLOOD CULTURE, ROUTINE: NORMAL
BLOOD CULTURE, ROUTINE: NORMAL

## 2018-09-06 ENCOUNTER — APPOINTMENT (OUTPATIENT)
Dept: CT IMAGING | Age: 48
End: 2018-09-06
Payer: MEDICARE

## 2018-09-06 ENCOUNTER — HOSPITAL ENCOUNTER (EMERGENCY)
Age: 48
Discharge: HOME | End: 2018-09-06
Attending: EMERGENCY MEDICINE
Payer: MEDICARE

## 2018-09-06 ENCOUNTER — APPOINTMENT (OUTPATIENT)
Dept: ULTRASOUND IMAGING | Age: 48
End: 2018-09-06
Payer: MEDICARE

## 2018-09-06 VITALS
TEMPERATURE: 98.3 F | RESPIRATION RATE: 16 BRPM | DIASTOLIC BLOOD PRESSURE: 101 MMHG | OXYGEN SATURATION: 99 % | HEART RATE: 66 BPM | SYSTOLIC BLOOD PRESSURE: 145 MMHG

## 2018-09-06 DIAGNOSIS — N17.0 ACUTE RENAL FAILURE WITH ACUTE TUBULAR NECROSIS SUPERIMPOSED ON STAGE 3 CHRONIC KIDNEY DISEASE (HCC): Primary | ICD-10-CM

## 2018-09-06 DIAGNOSIS — N18.30 ACUTE RENAL FAILURE WITH ACUTE TUBULAR NECROSIS SUPERIMPOSED ON STAGE 3 CHRONIC KIDNEY DISEASE (HCC): Primary | ICD-10-CM

## 2018-09-06 DIAGNOSIS — N83.202 CYST OF LEFT OVARY: ICD-10-CM

## 2018-09-06 LAB
ALBUMIN SERPL-MCNC: 4.2 G/DL (ref 3.5–5.1)
ALP BLD-CCNC: 53 U/L (ref 38–126)
ALT SERPL-CCNC: 8 U/L (ref 11–66)
AMPHETAMINE+METHAMPHETAMINE URINE SCREEN: NEGATIVE
ANION GAP SERPL CALCULATED.3IONS-SCNC: 14 MEQ/L (ref 8–16)
AST SERPL-CCNC: 16 U/L (ref 5–40)
BACTERIA: ABNORMAL /HPF
BARBITURATE QUANTITATIVE URINE: NEGATIVE
BASOPHILS # BLD: 0.4 %
BASOPHILS ABSOLUTE: 0 THOU/MM3 (ref 0–0.1)
BENZODIAZEPINE QUANTITATIVE URINE: NEGATIVE
BILIRUB SERPL-MCNC: 0.2 MG/DL (ref 0.3–1.2)
BILIRUBIN DIRECT: < 0.2 MG/DL (ref 0–0.3)
BILIRUBIN URINE: NEGATIVE
BLOOD, URINE: ABNORMAL
BUN BLDV-MCNC: 26 MG/DL (ref 7–22)
CALCIUM SERPL-MCNC: 9.1 MG/DL (ref 8.5–10.5)
CANNABINOID QUANTITATIVE URINE: POSITIVE
CASTS 2: ABNORMAL /LPF
CASTS UA: ABNORMAL /LPF
CHARACTER, URINE: CLEAR
CHLORIDE BLD-SCNC: 104 MEQ/L (ref 98–111)
CO2: 21 MEQ/L (ref 23–33)
COCAINE METABOLITE QUANTITATIVE URINE: POSITIVE
COLOR: YELLOW
CREAT SERPL-MCNC: 2.3 MG/DL (ref 0.4–1.2)
CRYSTALS, UA: ABNORMAL
EOSINOPHIL # BLD: 1.5 %
EOSINOPHILS ABSOLUTE: 0.1 THOU/MM3 (ref 0–0.4)
EPITHELIAL CELLS, UA: ABNORMAL /HPF
ERYTHROCYTE [DISTWIDTH] IN BLOOD BY AUTOMATED COUNT: 15.3 % (ref 11.5–14.5)
ERYTHROCYTE [DISTWIDTH] IN BLOOD BY AUTOMATED COUNT: 48.8 FL (ref 35–45)
ETHYL ALCOHOL, SERUM: < 0.01 %
GFR SERPL CREATININE-BSD FRML MDRD: 27 ML/MIN/1.73M2
GLUCOSE BLD-MCNC: 98 MG/DL (ref 70–108)
GLUCOSE URINE: NEGATIVE MG/DL
HCT VFR BLD CALC: 36.5 % (ref 37–47)
HEMOGLOBIN: 11.7 GM/DL (ref 12–16)
IMMATURE GRANS (ABS): 0.02 THOU/MM3 (ref 0–0.07)
IMMATURE GRANULOCYTES: 0.4 %
KETONES, URINE: NEGATIVE
LEUKOCYTE ESTERASE, URINE: NEGATIVE
LIPASE: 58.7 U/L (ref 5.6–51.3)
LYMPHOCYTES # BLD: 25.5 %
LYMPHOCYTES ABSOLUTE: 1.2 THOU/MM3 (ref 1–4.8)
MAGNESIUM: 2 MG/DL (ref 1.6–2.4)
MCH RBC QN AUTO: 28 PG (ref 26–33)
MCHC RBC AUTO-ENTMCNC: 32.1 GM/DL (ref 32.2–35.5)
MCV RBC AUTO: 87.3 FL (ref 81–99)
MISCELLANEOUS 2: ABNORMAL
MONOCYTES # BLD: 9.3 %
MONOCYTES ABSOLUTE: 0.4 THOU/MM3 (ref 0.4–1.3)
NITRITE, URINE: NEGATIVE
NUCLEATED RED BLOOD CELLS: 0 /100 WBC
OPIATES, URINE: NEGATIVE
OSMOLALITY CALCULATION: 282.3 MOSMOL/KG (ref 275–300)
OXYCODONE: NEGATIVE
PH UA: 6
PHENCYCLIDINE QUANTITATIVE URINE: NEGATIVE
PLATELET # BLD: 195 THOU/MM3 (ref 130–400)
PMV BLD AUTO: 11.9 FL (ref 9.4–12.4)
POTASSIUM SERPL-SCNC: 4.5 MEQ/L (ref 3.5–5.2)
PREGNANCY, SERUM: NEGATIVE
PROTEIN UA: 100
RBC # BLD: 4.18 MILL/MM3 (ref 4.2–5.4)
RBC URINE: > 100 /HPF
RENAL EPITHELIAL, UA: ABNORMAL
SEG NEUTROPHILS: 62.9 %
SEGMENTED NEUTROPHILS ABSOLUTE COUNT: 2.9 THOU/MM3 (ref 1.8–7.7)
SODIUM BLD-SCNC: 139 MEQ/L (ref 135–145)
SPECIFIC GRAVITY, URINE: 1.02 (ref 1–1.03)
TOTAL PROTEIN: 6.9 G/DL (ref 6.1–8)
UROBILINOGEN, URINE: 0.2 EU/DL
WBC # BLD: 4.6 THOU/MM3 (ref 4.8–10.8)
WBC UA: ABNORMAL /HPF
YEAST: ABNORMAL

## 2018-09-06 PROCEDURE — 82248 BILIRUBIN DIRECT: CPT

## 2018-09-06 PROCEDURE — G0480 DRUG TEST DEF 1-7 CLASSES: HCPCS

## 2018-09-06 PROCEDURE — 2580000003 HC RX 258: Performed by: EMERGENCY MEDICINE

## 2018-09-06 PROCEDURE — 85025 COMPLETE CBC W/AUTO DIFF WBC: CPT

## 2018-09-06 PROCEDURE — 83735 ASSAY OF MAGNESIUM: CPT

## 2018-09-06 PROCEDURE — 84703 CHORIONIC GONADOTROPIN ASSAY: CPT

## 2018-09-06 PROCEDURE — 80053 COMPREHEN METABOLIC PANEL: CPT

## 2018-09-06 PROCEDURE — 80307 DRUG TEST PRSMV CHEM ANLYZR: CPT

## 2018-09-06 PROCEDURE — 99284 EMERGENCY DEPT VISIT MOD MDM: CPT

## 2018-09-06 PROCEDURE — 36415 COLL VENOUS BLD VENIPUNCTURE: CPT

## 2018-09-06 PROCEDURE — 6370000000 HC RX 637 (ALT 250 FOR IP): Performed by: EMERGENCY MEDICINE

## 2018-09-06 PROCEDURE — 93975 VASCULAR STUDY: CPT

## 2018-09-06 PROCEDURE — 83690 ASSAY OF LIPASE: CPT

## 2018-09-06 PROCEDURE — 74176 CT ABD & PELVIS W/O CONTRAST: CPT

## 2018-09-06 PROCEDURE — 76830 TRANSVAGINAL US NON-OB: CPT

## 2018-09-06 PROCEDURE — 81001 URINALYSIS AUTO W/SCOPE: CPT

## 2018-09-06 RX ORDER — HYDROCODONE BITARTRATE AND ACETAMINOPHEN 5; 325 MG/1; MG/1
1 TABLET ORAL ONCE
Status: COMPLETED | OUTPATIENT
Start: 2018-09-06 | End: 2018-09-06

## 2018-09-06 RX ORDER — HYDROCODONE BITARTRATE AND ACETAMINOPHEN 5; 325 MG/1; MG/1
1 TABLET ORAL EVERY 6 HOURS PRN
Qty: 10 TABLET | Refills: 0 | Status: SHIPPED | OUTPATIENT
Start: 2018-09-06 | End: 2018-09-10

## 2018-09-06 RX ORDER — 0.9 % SODIUM CHLORIDE 0.9 %
1000 INTRAVENOUS SOLUTION INTRAVENOUS ONCE
Status: COMPLETED | OUTPATIENT
Start: 2018-09-06 | End: 2018-09-06

## 2018-09-06 RX ADMIN — SODIUM CHLORIDE 1000 ML: 9 INJECTION, SOLUTION INTRAVENOUS at 05:07

## 2018-09-06 RX ADMIN — SODIUM CHLORIDE 1000 ML: 9 INJECTION, SOLUTION INTRAVENOUS at 07:09

## 2018-09-06 RX ADMIN — HYDROCODONE BITARTRATE AND ACETAMINOPHEN 1 TABLET: 5; 325 TABLET ORAL at 06:16

## 2018-09-06 ASSESSMENT — PAIN DESCRIPTION - LOCATION
LOCATION: FLANK
LOCATION: FLANK

## 2018-09-06 ASSESSMENT — PAIN DESCRIPTION - ORIENTATION: ORIENTATION: LEFT

## 2018-09-06 ASSESSMENT — PAIN SCALES - GENERAL
PAINLEVEL_OUTOF10: 6
PAINLEVEL_OUTOF10: 5
PAINLEVEL_OUTOF10: 6

## 2018-09-06 ASSESSMENT — PAIN DESCRIPTION - DESCRIPTORS: DESCRIPTORS: SHARP

## 2018-09-06 ASSESSMENT — PAIN DESCRIPTION - PAIN TYPE: TYPE: ACUTE PAIN

## 2018-09-06 NOTE — ED NOTES
RN spoke with provider regarding order for another bolus. Patient to receive 1 more liter bolus before being discharged. RN into patient's room to discuss plan of care. Patient stated an understanding.        [address] [place] [address]  09/06/18 1324

## 2018-09-06 NOTE — ED NOTES
Pt resting on cot with eyes closed for comfort. Pt requesting pain medication at this time. Provider notified.       Viry Brizuela RN  09/06/18 1291

## 2018-09-06 NOTE — ED TRIAGE NOTES
Pt presents to ED via triage with c/o flank pain. Pt reports that she has an ovarian cyst on her left ovary along with decreased kidney function. Pt has a history of UTI's. Urine specimen obtained and sent to lab.

## 2018-09-06 NOTE — ED PROVIDER NOTES
[facility]  eMERGENCY dEPARTMENT eNCOUnter          [address] [facility]       Chief Complaint   Patient presents with    Flank Pain       Nurses Notes reviewed and I agree except as noted in the HPI. HISTORY OF PRESENT ILLNESS    Doni Chang is a 53 y.o. female who presents to the Emergency Department for the evaluation of flank pain. The patient reports that her left flank tenderness began tonight. She rates her pain at a 6/10 in severity and describes it as sharp in character. She was admitted on 06/30/18 for acute kidney injury, and while she was admitted she was diagnosed with a left ovarian cyst. She was seen by Dr. Yahaira Damian (OB GYN) who considered surgery, but the patient declined. She also has a history of UTIs. Patient denies any further complaints at initial encounter. The HPI was provided by the patient. REVIEW OF SYSTEMS     Review of Systems   Constitutional: Negative for activity change, appetite change, diaphoresis, fatigue and unexpected weight change. HENT: Negative for congestion, ear discharge, ear pain, hearing loss, rhinorrhea, sinus pressure, sore throat, trouble swallowing and voice change. Eyes: Negative for photophobia, pain, discharge, redness and itching. Respiratory: Negative for cough, choking, chest tightness, shortness of breath and wheezing. Cardiovascular: Negative for chest pain, palpitations and leg swelling. Gastrointestinal: Negative for abdominal distention, abdominal pain, blood in stool, constipation, diarrhea, nausea and vomiting. Endocrine: Negative for polydipsia, polyphagia and polyuria. Genitourinary: Positive for flank pain. Negative for decreased urine volume, difficulty urinating, dysuria, enuresis, frequency, hematuria and urgency. Musculoskeletal: Negative for arthralgias, back pain, gait problem, myalgias, neck pain and neck stiffness. Skin: Negative for pallor and rash.    Allergic/Immunologic: Negative for immunocompromised state. Neurological: Negative for dizziness, tremors, seizures, syncope, facial asymmetry, weakness, light-headedness, numbness and headaches. Hematological: Negative for adenopathy. Does not bruise/bleed easily. Psychiatric/Behavioral: Negative for agitation, hallucinations and suicidal ideas. The patient is not nervous/anxious. PAST MEDICAL HISTORY    has a past medical history of Aneurysm ([address] Eczema; Hypertension; and Thyroid disease. SURGICAL HISTORY      has a past surgical history that includes  section (86 87 90); salpingectomy; Tubal ligation (); Tunneled venous port placement (Left, ); Thyroidectomy, partial (); Heel spur surgery (Right); and pr egd transoral biopsy single/multiple (Left, 2018). CURRENT MEDICATIONS       Discharge Medication List as of 2018  6:59 AM      CONTINUE these medications which have NOT CHANGED    Details   pantoprazole (PROTONIX) 40 MG tablet Take 1 tablet by mouth every morning (before breakfast), Disp-30 tablet, R-3Normal      buPROPion (WELLBUTRIN SR) 150 MG extended release tablet Take 150 mg by mouth dailyHistorical Med      RA ASPIRIN EC 81 MG EC tablet take 1 tablet by mouth once daily, Disp-30 tablet, R-6Normal      amLODIPine (NORVASC) 10 MG tablet Take 10 mg by mouth dailyHistorical Med      atorvastatin (LIPITOR) 20 MG tablet Take 20 mg by mouth dailyHistorical Med             ALLERGIES     is allergic to fish-derived products. FAMILY HISTORY     indicated that the status of her mother is unknown. She indicated that the status of her maternal grandfather is unknown. She indicated that the status of her paternal grandfather is unknown.    family history includes Cancer in her maternal grandfather and mother; Diabetes in her paternal grandfather; Heart Disease in her paternal grandfather. SOCIAL HISTORY      reports that she has been smoking Cigarettes. She has a 8.25 pack-year smoking history. the posterior cul-de-sac. **This report has been created using voice recognition software. It may contain minor errors which are inherent in voice recognition technology. **       Final report electronically signed by Dr. Chrissy Ayon on 9/6/2018 6:13 AM          LABS:   Labs Reviewed   CBC WITH AUTO DIFFERENTIAL - Abnormal; Notable for the following:        Result Value    WBC 4.6 (*)     RBC 4.18 (*)     Hemoglobin 11.7 (*)     Hematocrit 36.5 (*)     MCHC 32.1 (*)     RDW-CV 15.3 (*)     RDW-SD 48.8 (*)     All other components within normal limits   BASIC METABOLIC PANEL - Abnormal; Notable for the following:     CO2 21 (*)     BUN 26 (*)     CREATININE 2.3 (*)     All other components within normal limits   HEPATIC FUNCTION PANEL - Abnormal; Notable for the following: Total Bilirubin 0.2 (*)     ALT 8 (*)     All other components within normal limits   LIPASE - Abnormal; Notable for the following:     Lipase 58.7 (*)     All other components within normal limits   URINE WITH REFLEXED MICRO - Abnormal; Notable for the following:     Blood, Urine LARGE (*)     Protein,  (*)     All other components within normal limits   GLOMERULAR FILTRATION RATE, ESTIMATED - Abnormal; Notable for the following:     Est, Glom Filt Rate 27 (*)     All other components within normal limits   MAGNESIUM   HCG, SERUM, QUALITATIVE   ETHANOL   URINE DRUG SCREEN   ANION GAP   OSMOLALITY       EMERGENCY DEPARTMENT COURSE:   Vitals:    Vitals:    09/06/18 0442 09/06/18 0615 09/06/18 0707   BP: (!) 133/104  (!) 145/101   Pulse: 101 97 66   Resp: 16 16 16   Temp: 98.3 °F (36.8 °C)     TempSrc: Oral     SpO2: 97% 98% 99%     4:54 AM: The patient was seen and evaluated. Appropriate labs were ordered and reviewed. Patient has known history of ovarian cysts. She is supposed to follow-up with Dr. Thierno Palmer. Labs here today did reveal that her BUN and creatinine were going up again. Her CO2 is low.   Indicating that she is

## 2018-09-09 ASSESSMENT — ENCOUNTER SYMPTOMS
SHORTNESS OF BREATH: 0
EYE REDNESS: 0
EYE ITCHING: 0
CHEST TIGHTNESS: 0
VOMITING: 0
NAUSEA: 0
ABDOMINAL PAIN: 0
SORE THROAT: 0
ABDOMINAL DISTENTION: 0
CHOKING: 0
PHOTOPHOBIA: 0
TROUBLE SWALLOWING: 0
WHEEZING: 0
DIARRHEA: 0
VOICE CHANGE: 0
EYE PAIN: 0
SINUS PRESSURE: 0
BLOOD IN STOOL: 0
EYE DISCHARGE: 0
BACK PAIN: 0
COUGH: 0
RHINORRHEA: 0
CONSTIPATION: 0

## 2018-10-12 ENCOUNTER — OFFICE VISIT (OUTPATIENT)
Dept: NEPHROLOGY | Age: 48
End: 2018-10-12

## 2018-10-12 VITALS
DIASTOLIC BLOOD PRESSURE: 108 MMHG | HEART RATE: 73 BPM | BODY MASS INDEX: 29.66 KG/M2 | SYSTOLIC BLOOD PRESSURE: 143 MMHG | WEIGHT: 189 LBS | HEIGHT: 67 IN

## 2018-10-12 DIAGNOSIS — F17.200 SMOKING: ICD-10-CM

## 2018-10-12 DIAGNOSIS — R31.29 MICROSCOPIC HEMATURIA: ICD-10-CM

## 2018-10-12 DIAGNOSIS — N18.30 CKD (CHRONIC KIDNEY DISEASE), STAGE III (HCC): Primary | ICD-10-CM

## 2018-10-12 DIAGNOSIS — I10 ESSENTIAL HYPERTENSION: ICD-10-CM

## 2018-10-12 DIAGNOSIS — F19.10 SUBSTANCE ABUSE (HCC): ICD-10-CM

## 2018-10-12 PROCEDURE — G8598 ASA/ANTIPLAT THER USED: HCPCS | Performed by: INTERNAL MEDICINE

## 2018-10-12 PROCEDURE — G8417 CALC BMI ABV UP PARAM F/U: HCPCS | Performed by: INTERNAL MEDICINE

## 2018-10-12 PROCEDURE — G8427 DOCREV CUR MEDS BY ELIG CLIN: HCPCS | Performed by: INTERNAL MEDICINE

## 2018-10-12 PROCEDURE — 99204 OFFICE O/P NEW MOD 45 MIN: CPT | Performed by: INTERNAL MEDICINE

## 2018-10-12 PROCEDURE — 4004F PT TOBACCO SCREEN RCVD TLK: CPT | Performed by: INTERNAL MEDICINE

## 2018-10-12 PROCEDURE — G8484 FLU IMMUNIZE NO ADMIN: HCPCS | Performed by: INTERNAL MEDICINE

## 2018-10-13 PROBLEM — F19.10 SUBSTANCE ABUSE (HCC): Status: ACTIVE | Noted: 2018-10-13

## 2019-02-25 ENCOUNTER — OFFICE VISIT (OUTPATIENT)
Dept: CARDIOLOGY CLINIC | Age: 49
End: 2019-02-25
Payer: MEDICAID

## 2019-02-25 VITALS
HEART RATE: 76 BPM | DIASTOLIC BLOOD PRESSURE: 132 MMHG | SYSTOLIC BLOOD PRESSURE: 208 MMHG | HEIGHT: 67 IN | BODY MASS INDEX: 29.35 KG/M2 | WEIGHT: 187 LBS

## 2019-02-25 DIAGNOSIS — N17.9 AKI (ACUTE KIDNEY INJURY) (HCC): Primary | ICD-10-CM

## 2019-02-25 DIAGNOSIS — R07.9 CHEST PAIN, UNSPECIFIED TYPE: ICD-10-CM

## 2019-02-25 PROCEDURE — 99213 OFFICE O/P EST LOW 20 MIN: CPT | Performed by: INTERNAL MEDICINE

## 2019-02-25 RX ORDER — AMLODIPINE BESYLATE 10 MG/1
10 TABLET ORAL DAILY
Qty: 90 TABLET | Refills: 3 | Status: SHIPPED | OUTPATIENT
Start: 2019-02-25 | End: 2020-04-02 | Stop reason: SDUPTHER

## 2019-02-25 RX ORDER — ASPIRIN 81 MG/1
TABLET ORAL
Qty: 90 TABLET | Refills: 3 | Status: ON HOLD | OUTPATIENT
Start: 2019-02-25 | End: 2021-04-03 | Stop reason: SDUPTHER

## 2019-02-25 RX ORDER — ATORVASTATIN CALCIUM 20 MG/1
20 TABLET, FILM COATED ORAL DAILY
Qty: 90 TABLET | Refills: 3 | Status: ON HOLD | OUTPATIENT
Start: 2019-02-25 | End: 2021-04-03 | Stop reason: SDUPTHER

## 2019-04-05 ENCOUNTER — HOSPITAL ENCOUNTER (OUTPATIENT)
Age: 49
Discharge: HOME | End: 2019-04-05
Payer: MEDICAID

## 2019-04-05 DIAGNOSIS — R31.29 MICROSCOPIC HEMATURIA: ICD-10-CM

## 2019-04-05 DIAGNOSIS — N18.30 CKD (CHRONIC KIDNEY DISEASE), STAGE III (HCC): ICD-10-CM

## 2019-04-05 LAB
ANION GAP SERPL CALCULATED.3IONS-SCNC: 11 MEQ/L (ref 8–16)
BACTERIA: ABNORMAL
BILIRUBIN URINE: NEGATIVE
BLOOD, URINE: ABNORMAL
BUN BLDV-MCNC: 23 MG/DL (ref 7–22)
CALCIUM SERPL-MCNC: 8.8 MG/DL (ref 8.5–10.5)
CASTS: ABNORMAL /LPF
CASTS: ABNORMAL /LPF
CHARACTER, URINE: CLEAR
CHLORIDE BLD-SCNC: 104 MEQ/L (ref 98–111)
CO2: 26 MEQ/L (ref 23–33)
COLOR: YELLOW
CREAT SERPL-MCNC: 1.6 MG/DL (ref 0.4–1.2)
CREATININE URINE: 131.8 MG/DL
CRYSTALS: ABNORMAL
EPITHELIAL CELLS, UA: ABNORMAL /HPF
GFR SERPL CREATININE-BSD FRML MDRD: 42 ML/MIN/1.73M2
GLUCOSE BLD-MCNC: 104 MG/DL (ref 70–108)
GLUCOSE, URINE: NEGATIVE MG/DL
KETONES, URINE: NEGATIVE
LEUKOCYTE ESTERASE, URINE: NEGATIVE
MISCELLANEOUS LAB TEST RESULT: ABNORMAL
NITRITE, URINE: NEGATIVE
PH UA: 5.5 (ref 5–9)
POTASSIUM SERPL-SCNC: 3.7 MEQ/L (ref 3.5–5.2)
PROT/CREAT RATIO, UR: 0.42
PROTEIN UA: 30 MG/DL
PROTEIN, URINE: 54.8 MG/DL
RBC URINE: ABNORMAL /HPF
RENAL EPITHELIAL, UA: ABNORMAL
SODIUM BLD-SCNC: 141 MEQ/L (ref 135–145)
SPECIFIC GRAVITY UA: 1.02 (ref 1–1.03)
UROBILINOGEN, URINE: 0.2 EU/DL (ref 0–1)
WBC UA: ABNORMAL /HPF
YEAST: ABNORMAL

## 2019-04-05 PROCEDURE — 86038 ANTINUCLEAR ANTIBODIES: CPT

## 2019-04-05 PROCEDURE — 82570 ASSAY OF URINE CREATININE: CPT

## 2019-04-05 PROCEDURE — 84156 ASSAY OF PROTEIN URINE: CPT

## 2019-04-05 PROCEDURE — 86160 COMPLEMENT ANTIGEN: CPT

## 2019-04-05 PROCEDURE — 36415 COLL VENOUS BLD VENIPUNCTURE: CPT

## 2019-04-05 PROCEDURE — 80048 BASIC METABOLIC PNL TOTAL CA: CPT

## 2019-04-05 PROCEDURE — 81001 URINALYSIS AUTO W/SCOPE: CPT

## 2019-04-09 LAB
ANA SCREEN: NORMAL
C3 COMPLEMENT: 102 MG/DL (ref 88–201)
C4 COMPLEMENT: 18 MG/DL (ref 10–40)

## 2019-04-29 ENCOUNTER — OFFICE VISIT (OUTPATIENT)
Dept: NEPHROLOGY | Age: 49
End: 2019-04-29
Payer: MEDICAID

## 2019-04-29 VITALS
SYSTOLIC BLOOD PRESSURE: 118 MMHG | OXYGEN SATURATION: 100 % | DIASTOLIC BLOOD PRESSURE: 83 MMHG | BODY MASS INDEX: 28.44 KG/M2 | HEART RATE: 89 BPM | WEIGHT: 181.6 LBS

## 2019-04-29 DIAGNOSIS — N18.30 CKD (CHRONIC KIDNEY DISEASE), STAGE III (HCC): Primary | ICD-10-CM

## 2019-04-29 DIAGNOSIS — I10 ESSENTIAL HYPERTENSION: ICD-10-CM

## 2019-04-29 PROCEDURE — 99213 OFFICE O/P EST LOW 20 MIN: CPT | Performed by: INTERNAL MEDICINE

## 2019-04-29 NOTE — PROGRESS NOTES
exam, appears comfortable, no distress  Oral: moist oral mucus membranes  Neck: No jugular venous distention  Lungs: Air entry B/L, no crackles or rales, no use of accessory muscles  Heart: S1, S2 heard  GI: soft, non-tender, no guarding  Extremities: No sig LE edema     Medications:  Current Outpatient Medications   Medication Sig Dispense Refill    atorvastatin (LIPITOR) 20 MG tablet Take 1 tablet by mouth daily 90 tablet 3    aspirin (RA ASPIRIN EC) 81 MG EC tablet take 1 tablet by mouth once daily 90 tablet 3    amLODIPine (NORVASC) 10 MG tablet Take 1 tablet by mouth daily 90 tablet 3     No current facility-administered medications for this visit. Laboratory & Diagnostics:  Radiology/US kidneys: June 2018: R 9, L 10.3 cm  Old labs reviewed:  July 2018: Creat 1.5  Sept 2018: Creat 2.3, UA: + blood, + protein    April 2019: K 3.7, creat 1.6, UPCR 420 mg/g, UA: trace blood/protein  MARYLIN: (-), complements (-)     Impression/Plan:   1. CKD III: likely secondary to HTN nephrosclerosis. Creat is 1.6 and at baseline. Strongly advised to avoid NSAIDs. Avoid dehydration. Await renal dopplers. She took ibuprofen and aleve in the past.   2. HTN: advised to monitor BP at home. She is on norvasc 10 mg po daily. Advised low salt diet. 3. Substance use: Strongly advised against these. 4. Hx NSAID use: advised to avoid use of NSAIDs in setting of CKD  5. Smoking: cessation counseling    BMp in one month  RTC in one year     Orders Placed This Encounter   Procedures    Protein / creatinine ratio, urine    Basic Metabolic Panel    Basic Metabolic Panel     Return in about 1 year (around 4/29/2020).     Zoë Tobin MD  [facility]

## 2019-04-30 ENCOUNTER — HOSPITAL ENCOUNTER (OUTPATIENT)
Dept: ULTRASOUND IMAGING | Age: 49
Discharge: HOME | End: 2019-04-30
Payer: MEDICAID

## 2019-04-30 DIAGNOSIS — N18.30 CKD (CHRONIC KIDNEY DISEASE), STAGE III (HCC): ICD-10-CM

## 2019-04-30 PROCEDURE — 93975 VASCULAR STUDY: CPT

## 2019-05-09 ENCOUNTER — APPOINTMENT (OUTPATIENT)
Dept: CT IMAGING | Age: 49
End: 2019-05-09
Payer: MEDICAID

## 2019-05-09 ENCOUNTER — HOSPITAL ENCOUNTER (EMERGENCY)
Age: 49
Discharge: HOME | End: 2019-05-09
Payer: MEDICAID

## 2019-05-09 VITALS
WEIGHT: 184 LBS | RESPIRATION RATE: 14 BRPM | HEART RATE: 64 BPM | HEIGHT: 67 IN | BODY MASS INDEX: 28.88 KG/M2 | OXYGEN SATURATION: 100 % | DIASTOLIC BLOOD PRESSURE: 87 MMHG | TEMPERATURE: 98.2 F | SYSTOLIC BLOOD PRESSURE: 135 MMHG

## 2019-05-09 DIAGNOSIS — R10.9 FLANK PAIN: Primary | ICD-10-CM

## 2019-05-09 LAB
ALBUMIN SERPL-MCNC: 3.5 G/DL (ref 3.5–5.1)
ALP BLD-CCNC: 45 U/L (ref 38–126)
ALT SERPL-CCNC: 9 U/L (ref 11–66)
AMPHETAMINE+METHAMPHETAMINE URINE SCREEN: NEGATIVE
ANION GAP SERPL CALCULATED.3IONS-SCNC: 10 MEQ/L (ref 8–16)
AST SERPL-CCNC: 13 U/L (ref 5–40)
BACTERIA: ABNORMAL /HPF
BARBITURATE QUANTITATIVE URINE: NEGATIVE
BASOPHILS # BLD: 0.6 %
BASOPHILS ABSOLUTE: 0 THOU/MM3 (ref 0–0.1)
BENZODIAZEPINE QUANTITATIVE URINE: NEGATIVE
BILIRUB SERPL-MCNC: 0.2 MG/DL (ref 0.3–1.2)
BILIRUBIN DIRECT: < 0.2 MG/DL (ref 0–0.3)
BILIRUBIN URINE: NEGATIVE
BLOOD, URINE: NEGATIVE
BUN BLDV-MCNC: 25 MG/DL (ref 7–22)
CALCIUM SERPL-MCNC: 8.6 MG/DL (ref 8.5–10.5)
CANNABINOID QUANTITATIVE URINE: NEGATIVE
CASTS 2: ABNORMAL /LPF
CASTS UA: ABNORMAL /LPF
CHARACTER, URINE: CLEAR
CHLORIDE BLD-SCNC: 103 MEQ/L (ref 98–111)
CO2: 25 MEQ/L (ref 23–33)
COCAINE METABOLITE QUANTITATIVE URINE: NORMAL
COLOR: ABNORMAL
CREAT SERPL-MCNC: 1.7 MG/DL (ref 0.4–1.2)
CRYSTALS, UA: ABNORMAL
EKG ATRIAL RATE: 78 BPM
EKG P AXIS: 47 DEGREES
EKG P-R INTERVAL: 156 MS
EKG Q-T INTERVAL: 394 MS
EKG QRS DURATION: 74 MS
EKG QTC CALCULATION (BAZETT): 449 MS
EKG R AXIS: -76 DEGREES
EKG T AXIS: 83 DEGREES
EKG VENTRICULAR RATE: 78 BPM
EOSINOPHIL # BLD: 2.1 %
EOSINOPHILS ABSOLUTE: 0.1 THOU/MM3 (ref 0–0.4)
EPITHELIAL CELLS, UA: ABNORMAL /HPF
ERYTHROCYTE [DISTWIDTH] IN BLOOD BY AUTOMATED COUNT: 15.8 % (ref 11.5–14.5)
ERYTHROCYTE [DISTWIDTH] IN BLOOD BY AUTOMATED COUNT: 51.8 FL (ref 35–45)
GFR SERPL CREATININE-BSD FRML MDRD: 39 ML/MIN/1.73M2
GLUCOSE BLD-MCNC: 92 MG/DL (ref 70–108)
GLUCOSE URINE: NEGATIVE MG/DL
HCT VFR BLD CALC: 37 % (ref 37–47)
HEMOGLOBIN: 11.4 GM/DL (ref 12–16)
IMMATURE GRANS (ABS): 0.01 THOU/MM3 (ref 0–0.07)
IMMATURE GRANULOCYTES: 0.3 %
KETONES, URINE: NEGATIVE
LEUKOCYTE ESTERASE, URINE: NEGATIVE
LIPASE: 56.4 U/L (ref 5.6–51.3)
LYMPHOCYTES # BLD: 31 %
LYMPHOCYTES ABSOLUTE: 1 THOU/MM3 (ref 1–4.8)
MAGNESIUM: 2.1 MG/DL (ref 1.6–2.4)
MCH RBC QN AUTO: 27.7 PG (ref 26–33)
MCHC RBC AUTO-ENTMCNC: 30.8 GM/DL (ref 32.2–35.5)
MCV RBC AUTO: 89.8 FL (ref 81–99)
MISCELLANEOUS 2: ABNORMAL
MONOCYTES # BLD: 13.9 %
MONOCYTES ABSOLUTE: 0.5 THOU/MM3 (ref 0.4–1.3)
NITRITE, URINE: NEGATIVE
NUCLEATED RED BLOOD CELLS: 0 /100 WBC
OPIATES, URINE: NEGATIVE
OSMOLALITY CALCULATION: 279.7 MOSMOL/KG (ref 275–300)
OXYCODONE: NEGATIVE
PH UA: 7 (ref 5–9)
PHENCYCLIDINE QUANTITATIVE URINE: NEGATIVE
PLATELET # BLD: 167 THOU/MM3 (ref 130–400)
PMV BLD AUTO: 11.9 FL (ref 9.4–12.4)
POTASSIUM SERPL-SCNC: 4.9 MEQ/L (ref 3.5–5.2)
PREGNANCY, SERUM: NEGATIVE
PROTEIN UA: ABNORMAL
RBC # BLD: 4.12 MILL/MM3 (ref 4.2–5.4)
RBC URINE: ABNORMAL /HPF
RENAL EPITHELIAL, UA: ABNORMAL
SEG NEUTROPHILS: 52.1 %
SEGMENTED NEUTROPHILS ABSOLUTE COUNT: 1.7 THOU/MM3 (ref 1.8–7.7)
SODIUM BLD-SCNC: 138 MEQ/L (ref 135–145)
SPECIFIC GRAVITY, URINE: 1.03 (ref 1–1.03)
TOTAL PROTEIN: 6.3 G/DL (ref 6.1–8)
TROPONIN T: < 0.01 NG/ML
UROBILINOGEN, URINE: 0.2 EU/DL (ref 0–1)
WBC # BLD: 3.3 THOU/MM3 (ref 4.8–10.8)
WBC UA: ABNORMAL /HPF
YEAST: ABNORMAL

## 2019-05-09 PROCEDURE — 93010 ELECTROCARDIOGRAM REPORT: CPT | Performed by: NUCLEAR MEDICINE

## 2019-05-09 PROCEDURE — 6360000004 HC RX CONTRAST MEDICATION: Performed by: PHYSICIAN ASSISTANT

## 2019-05-09 PROCEDURE — 83735 ASSAY OF MAGNESIUM: CPT

## 2019-05-09 PROCEDURE — 96375 TX/PRO/DX INJ NEW DRUG ADDON: CPT

## 2019-05-09 PROCEDURE — 83690 ASSAY OF LIPASE: CPT

## 2019-05-09 PROCEDURE — 84484 ASSAY OF TROPONIN QUANT: CPT

## 2019-05-09 PROCEDURE — 81001 URINALYSIS AUTO W/SCOPE: CPT

## 2019-05-09 PROCEDURE — 80053 COMPREHEN METABOLIC PANEL: CPT

## 2019-05-09 PROCEDURE — 76376 3D RENDER W/INTRP POSTPROCES: CPT

## 2019-05-09 PROCEDURE — 99284 EMERGENCY DEPT VISIT MOD MDM: CPT

## 2019-05-09 PROCEDURE — 96374 THER/PROPH/DIAG INJ IV PUSH: CPT

## 2019-05-09 PROCEDURE — 74177 CT ABD & PELVIS W/CONTRAST: CPT

## 2019-05-09 PROCEDURE — 80305 DRUG TEST PRSMV DIR OPT OBS: CPT

## 2019-05-09 PROCEDURE — 85025 COMPLETE CBC W/AUTO DIFF WBC: CPT

## 2019-05-09 PROCEDURE — 82248 BILIRUBIN DIRECT: CPT

## 2019-05-09 PROCEDURE — 36415 COLL VENOUS BLD VENIPUNCTURE: CPT

## 2019-05-09 PROCEDURE — 84703 CHORIONIC GONADOTROPIN ASSAY: CPT

## 2019-05-09 PROCEDURE — 6360000002 HC RX W HCPCS: Performed by: PHYSICIAN ASSISTANT

## 2019-05-09 PROCEDURE — 93005 ELECTROCARDIOGRAM TRACING: CPT | Performed by: PHYSICIAN ASSISTANT

## 2019-05-09 RX ORDER — CYCLOBENZAPRINE HCL 10 MG
10 TABLET ORAL 3 TIMES DAILY PRN
Qty: 20 TABLET | Refills: 0 | Status: SHIPPED | OUTPATIENT
Start: 2019-05-09 | End: 2019-11-20

## 2019-05-09 RX ORDER — KETOROLAC TROMETHAMINE 30 MG/ML
30 INJECTION, SOLUTION INTRAMUSCULAR; INTRAVENOUS ONCE
Status: COMPLETED | OUTPATIENT
Start: 2019-05-09 | End: 2019-05-09

## 2019-05-09 RX ORDER — ONDANSETRON 2 MG/ML
4 INJECTION INTRAMUSCULAR; INTRAVENOUS ONCE
Status: COMPLETED | OUTPATIENT
Start: 2019-05-09 | End: 2019-05-09

## 2019-05-09 RX ADMIN — IOPAMIDOL 80 ML: 755 INJECTION, SOLUTION INTRAVENOUS at 07:55

## 2019-05-09 RX ADMIN — ONDANSETRON 4 MG: 2 INJECTION INTRAMUSCULAR; INTRAVENOUS at 07:25

## 2019-05-09 RX ADMIN — KETOROLAC TROMETHAMINE 30 MG: 30 INJECTION, SOLUTION INTRAMUSCULAR at 07:24

## 2019-05-09 ASSESSMENT — ENCOUNTER SYMPTOMS
NAUSEA: 0
BACK PAIN: 0
ABDOMINAL PAIN: 1
WHEEZING: 0
EYE DISCHARGE: 0
DIARRHEA: 0
RHINORRHEA: 0
EYE PAIN: 0
SORE THROAT: 0
COUGH: 0
SHORTNESS OF BREATH: 0
VOMITING: 0

## 2019-05-09 ASSESSMENT — PAIN DESCRIPTION - LOCATION
LOCATION: ABDOMEN;FLANK
LOCATION: ABDOMEN

## 2019-05-09 ASSESSMENT — PAIN DESCRIPTION - DESCRIPTORS
DESCRIPTORS: CRAMPING
DESCRIPTORS: CRAMPING

## 2019-05-09 ASSESSMENT — PAIN DESCRIPTION - PROGRESSION: CLINICAL_PROGRESSION: NOT CHANGED

## 2019-05-09 ASSESSMENT — PAIN DESCRIPTION - ORIENTATION
ORIENTATION: LOWER
ORIENTATION: LOWER

## 2019-05-09 ASSESSMENT — PAIN DESCRIPTION - ONSET: ONSET: ON-GOING

## 2019-05-09 ASSESSMENT — PAIN DESCRIPTION - FREQUENCY
FREQUENCY: INTERMITTENT
FREQUENCY: INTERMITTENT

## 2019-05-09 ASSESSMENT — PAIN SCALES - GENERAL
PAINLEVEL_OUTOF10: 6
PAINLEVEL_OUTOF10: 6

## 2019-05-09 ASSESSMENT — PAIN DESCRIPTION - PAIN TYPE
TYPE: ACUTE PAIN
TYPE: ACUTE PAIN

## 2019-05-09 NOTE — LETTER
[address] [facility] [address] [address] EMERGENCY DEPT  [address]  [place] [address] [address] [address].[facility] [address]  Phone: [phone]               May 9, 2019    Patient: Anna Vicente   YOB: 1970   Date of Visit: 5/9/2019       To Whom It May Concern:    Anna Vicente was seen and treated in our emergency department on 5/9/2019. She may return to work on 05/10/2019.       Sincerely,       TAMARA Gray         Signature:__________________________________

## 2019-05-09 NOTE — ED PROVIDER NOTES
[facility]  eMERGENCY dEPARTMENT eNCOUnter          [address] [facility]       Chief Complaint   Patient presents with    Urinary Frequency    Flank Pain    Dizziness       Nurses Notes reviewed and I agree except as noted in the HPI. HISTORY OF PRESENT ILLNESS    Gustabo Polk is a 51 y.o. female who presents to the Emergency Department for the evaluation of urinary frequency and flank pain. The patient reports that her left sided abdominal pain began 1 week ago. She rates her pain at a 6/10 in severity and describes it as cramping in character. She states that the pain radiates to her left flank and down to her left thigh. She also complains of headaches, dizziness, urinary frequency, and nasal congestion. She denies nausea, vomiting, diarrhea, fevers, chills, hematuria, or dysuria. She has a history of cocaine abuse. Patient denies any further complaints at initial encounter. The HPI was provided by the patient. REVIEW OF SYSTEMS     Review of Systems   Constitutional: Negative for appetite change, chills, fatigue and fever. HENT: Positive for congestion. Negative for ear pain, rhinorrhea and sore throat. Eyes: Negative for pain, discharge and visual disturbance. Respiratory: Negative for cough, shortness of breath and wheezing. Cardiovascular: Negative for chest pain, palpitations and leg swelling. Gastrointestinal: Positive for abdominal pain (left). Negative for diarrhea, nausea and vomiting. Genitourinary: Positive for flank pain (left) and frequency. Negative for difficulty urinating, dysuria, hematuria and vaginal discharge. Musculoskeletal: Negative for arthralgias, back pain, joint swelling and neck pain. Skin: Negative for pallor and rash. Neurological: Positive for dizziness. Negative for syncope, weakness, light-headedness, numbness and headaches. Hematological: Negative for adenopathy.    Psychiatric/Behavioral: Negative for confusion and suicidal ideas. The patient is not nervous/anxious. PAST MEDICAL HISTORY    has a past medical history of Aneurysm ([address] Eczema, Hypertension, and Thyroid disease. SURGICAL HISTORY      has a past surgical history that includes  section (86 87 90); salpingectomy; Tubal ligation (); Tunneled venous port placement (Left, ); Thyroidectomy, partial (); Heel spur surgery (Right); and pr egd transoral biopsy single/multiple (Left, 2018). CURRENT MEDICATIONS       Discharge Medication List as of 2019 10:12 AM      CONTINUE these medications which have NOT CHANGED    Details   atorvastatin (LIPITOR) 20 MG tablet Take 1 tablet by mouth daily, Disp-90 tablet, R-3Normal      aspirin (RA ASPIRIN EC) 81 MG EC tablet take 1 tablet by mouth once daily, Disp-90 tablet, R-3Normal      amLODIPine (NORVASC) 10 MG tablet Take 1 tablet by mouth daily, Disp-90 tablet, R-3Normal             ALLERGIES     is allergic to fish-derived products. FAMILY HISTORY     indicated that the status of her mother is unknown. She indicated that the status of her maternal grandfather is unknown. She indicated that the status of her paternal grandfather is unknown.  family history includes Cancer in her maternal grandfather and mother; Diabetes in her paternal grandfather; Heart Disease in her paternal grandfather. SOCIAL HISTORY      reports that she has been smoking cigarettes. She has a 16.50 pack-year smoking history. She has never used smokeless tobacco. She reports that she drinks alcohol. She reports that she has current or past drug history. Drug: Marijuana. PHYSICAL EXAM     INITIAL VITALS:  height is 5' 7\" (1.702 m) and weight is 184 lb (83.5 kg). Her oral temperature is 98.2 °F (36.8 °C). Her blood pressure is 135/87 and her pulse is 64. Her respiration is 14 and oxygen saturation is 100%. Physical Exam   Constitutional: She is oriented to person, place, and time.  She appears well-developed and well-nourished. Non-toxic appearance. HENT:   Head: Normocephalic and atraumatic. Right Ear: Tympanic membrane and external ear normal.   Left Ear: Tympanic membrane and external ear normal.   Nose: Nose normal.   Mouth/Throat: Oropharynx is clear and moist and mucous membranes are normal. No oropharyngeal exudate, posterior oropharyngeal edema or posterior oropharyngeal erythema. Eyes: Conjunctivae and EOM are normal.   Neck: Normal range of motion. Neck supple. No JVD present. Cardiovascular: Normal rate, regular rhythm, normal heart sounds, intact distal pulses and normal pulses. Exam reveals no gallop and no friction rub. No murmur heard. Pulmonary/Chest: Effort normal and breath sounds normal. No respiratory distress. She has no decreased breath sounds. She has no wheezes. She has no rhonchi. She has no rales. Abdominal: Soft. Bowel sounds are normal. She exhibits no distension. There is tenderness (mild) in the left upper quadrant and left lower quadrant. There is no rebound, no guarding and no CVA tenderness. Musculoskeletal: Normal range of motion. She exhibits no edema. Neurological: She is alert and oriented to person, place, and time. She exhibits normal muscle tone. Coordination normal.   Skin: Skin is warm and dry. No rash noted. She is not diaphoretic. Nursing note and vitals reviewed. DIFFERENTIAL DIAGNOSIS:   UTI, pyelonephritis, nephrolithiasis,     DIAGNOSTIC RESULTS     EKG: All EKG's are interpreted by the Emergency Department Physician who either signs or Co-signs this chart in theabsence of a cardiologist.    Apollo [place]. Rate: 78 bpm  TX interval: 156 ms  QRS duration: 74 ms  QTc: 449 ms  P-R-T axes: 47, -76, 83  Normal sinus rhythm. No STEMI    RADIOLOGY:non-plain film images(s) such as CT, Ultrasound and MRI are read by the radiologist.    CT ABDOMEN PELVIS W IV CONTRAST Additional Contrast? None   Final Result   Mild free fluid within the pelvis.  Cystic areas within each ovary, right greater than left. Probable small fibroid. Possible hemangioma within the liver. Lumbar spine shows no fracture. No definite compressive disc herniation. **This report has been created using voice recognition software. It may contain minor errors which are inherent in voice recognition technology. **      Final report electronically signed by Dr. Ronald Seth on 5/9/2019 8:28 AM      CT LUMBAR RECONSTRUCTION WO POST PROCESS   Final Result   Mild free fluid within the pelvis. Cystic areas within each ovary, right greater than left. Probable small fibroid. Possible hemangioma within the liver. Lumbar spine shows no fracture. No definite compressive disc herniation. **This report has been created using voice recognition software. It may contain minor errors which are inherent in voice recognition technology. **      Final report electronically signed by Dr. Ronald Seth on 5/9/2019 8:28 AM            LABS:     Labs Reviewed   CBC WITH AUTO DIFFERENTIAL - Abnormal; Notable for the following components:       Result Value    WBC 3.3 (*)     RBC 4.12 (*)     Hemoglobin 11.4 (*)     MCHC 30.8 (*)     RDW-CV 15.8 (*)     RDW-SD 51.8 (*)     Segs Absolute 1.7 (*)     All other components within normal limits   BASIC METABOLIC PANEL - Abnormal; Notable for the following components:    BUN 25 (*)     CREATININE 1.7 (*)     All other components within normal limits   HEPATIC FUNCTION PANEL - Abnormal; Notable for the following components:     Total Bilirubin 0.2 (*)     ALT 9 (*)     All other components within normal limits   LIPASE - Abnormal; Notable for the following components:    Lipase 56.4 (*)     All other components within normal limits   GLOMERULAR FILTRATION RATE, ESTIMATED - Abnormal; Notable for the following components:    Est, Glom Filt Rate 39 (*)     All other components within normal limits   URINE WITH REFLEXED MICRO - Abnormal; Notable for the following components:    Protein, UA TRACE (*)     All other components within normal limits   TROPONIN   MAGNESIUM   HCG, SERUM, QUALITATIVE   ANION GAP   OSMOLALITY   RAPID DRUG SCREEN, URINE       EMERGENCY DEPARTMENT COURSE:   Vitals:    Vitals:    05/09/19 0649 05/09/19 0806 05/09/19 0915   BP: (!) 135/97 (!) 142/99 135/87   Pulse: 75 67 64   Resp: 17 16 14   Temp: 98.2 °F (36.8 °C)     TempSrc: Oral     SpO2: 100% 100% 100%   Weight: 184 lb (83.5 kg)     Height: 5' 7\" (1.702 m)         7:10 AM: The patient was seen and evaluated. MDM:  The patient was seen and evaluated within the ED today following flank pain. Within the department, I observed the patient's vital signs to be within acceptable range. On exam, I appreciated left lower quadrant tenderness and left flank tenderness. Exam is otherwise negative. Laboratory work was reviewed and showed the patient to have elevated BUN at 25 and creatinine at 1.7. Patient also has a EGFR of 39. Radiological studies within the department revealed mild free fluid within the pelvis. Cystic areas within each ovary, right greater than left. Probable small fibroid. Possible hemangioma within the liver. Within the department, the patient was treated with Toradol and Zofran. I observed the patient's condition to remain stable during the duration of their stay. I explained my proposed course of treatment to the patient, and they were amenable to my decision. They were discharged home with Lodine and Flexeril, and they will return to the ED if their symptoms become more severe in nature, or otherwise change. CRITICAL CARE:   None     CONSULTS:  None    PROCEDURES:  None    FINAL IMPRESSION      1.  Flank pain          DISPOSITION/PLAN   Discharge    PATIENT REFERRED TO:  KORIN Stephenson - RICHARD  [place] [id number]     In 2 days        DISCHARGE MEDICATIONS:  Discharge Medication List as of 5/9/2019 10:12 AM      START taking these medications    Details   etodolac (LODINE) 300 MG capsule Take 1 capsule by mouth every 8 hours, Disp-30 capsule, R-0Print      cyclobenzaprine (FLEXERIL) 10 MG tablet Take 1 tablet by mouth 3 times daily as needed for Muscle spasms, Disp-20 tablet, R-0Print             (Please note that portions of this note were completed with a voice recognition program.Efforts were made to edit the dictations but occasionally words are mis-transcribed.)        Scribe: Kaleb Spicer   5/9/19 7:10 AM Scribing for and in the presence of [employer] PA-C. Signed by: Logan Ramirez    Provider:  I personally performed the services described in the documentation, reviewed and edited the documentation which was dictated to the scribe in my presence, andit accurately records my words and actions.     Antonio Medina PA-C 5/9/19 12:49 PM              TAMARA Guthrie  05/09/19 0214

## 2019-05-09 NOTE — ED NOTES
Patient resting in bed with eyes closed. Side rails remain up x2. Call light within reach.       Keely Perez RN  05/09/19 0828

## 2019-05-09 NOTE — LETTER
[address] [facility] [address] [address] EMERGENCY DEPT  [address] .[facility] [address]  Phone: [phone]               May 9, 2019    Patient: Stanley Bland   YOB: 1970   Date of Visit: 5/9/2019       To Whom It May Concern:    Stanley Bland was seen and treated in our emergency department on 5/9/2019. She may return to work on 05/10/2019.       Sincerely,       TAMARA Robert         Signature:__________________________________

## 2019-05-29 NOTE — ED TRIAGE NOTES
Pt to ED today via triage desk with c/o bilateral flank and lower abdominal pain \"6/10\", urinary frequency, and dizziness x1 week. Pt states no discharge, urine odor, burning with urination, or distention. Pt states urine is dark in color. Pt denies fever. Pt states no nausea, vomiting, chills, CP, or SOB.
[address] Exp Problem Focused - Mod. Complex

## 2019-11-17 ENCOUNTER — HOSPITAL ENCOUNTER (EMERGENCY)
Age: 49
Discharge: HOME | End: 2019-11-17
Attending: EMERGENCY MEDICINE
Payer: COMMERCIAL

## 2019-11-17 ENCOUNTER — APPOINTMENT (OUTPATIENT)
Dept: GENERAL RADIOLOGY | Age: 49
End: 2019-11-17
Payer: COMMERCIAL

## 2019-11-17 ENCOUNTER — APPOINTMENT (OUTPATIENT)
Dept: CT IMAGING | Age: 49
End: 2019-11-17
Payer: COMMERCIAL

## 2019-11-17 VITALS
HEART RATE: 77 BPM | TEMPERATURE: 98 F | OXYGEN SATURATION: 98 % | SYSTOLIC BLOOD PRESSURE: 143 MMHG | DIASTOLIC BLOOD PRESSURE: 97 MMHG | RESPIRATION RATE: 18 BRPM

## 2019-11-17 DIAGNOSIS — R07.9 CHEST PAIN WITH LOW RISK OF ACUTE CORONARY SYNDROME: Primary | ICD-10-CM

## 2019-11-17 DIAGNOSIS — I10 PRIMARY HYPERTENSION: ICD-10-CM

## 2019-11-17 DIAGNOSIS — I71.20 THORACIC AORTIC ANEURYSM WITHOUT RUPTURE: ICD-10-CM

## 2019-11-17 LAB
ALBUMIN SERPL-MCNC: 3.7 G/DL (ref 3.5–5.1)
ALP BLD-CCNC: 44 U/L (ref 38–126)
ALT SERPL-CCNC: 8 U/L (ref 11–66)
ANION GAP SERPL CALCULATED.3IONS-SCNC: 12 MEQ/L (ref 8–16)
AST SERPL-CCNC: 13 U/L (ref 5–40)
BASOPHILS # BLD: 0.5 %
BASOPHILS ABSOLUTE: 0 THOU/MM3 (ref 0–0.1)
BILIRUB SERPL-MCNC: < 0.2 MG/DL (ref 0.3–1.2)
BILIRUBIN DIRECT: < 0.2 MG/DL (ref 0–0.3)
BUN BLDV-MCNC: 22 MG/DL (ref 7–22)
CALCIUM SERPL-MCNC: 8.9 MG/DL (ref 8.5–10.5)
CHLORIDE BLD-SCNC: 103 MEQ/L (ref 98–111)
CO2: 21 MEQ/L (ref 23–33)
CREAT SERPL-MCNC: 1.7 MG/DL (ref 0.4–1.2)
EKG ATRIAL RATE: 57 BPM
EKG P AXIS: 20 DEGREES
EKG P-R INTERVAL: 144 MS
EKG Q-T INTERVAL: 388 MS
EKG QRS DURATION: 76 MS
EKG QTC CALCULATION (BAZETT): 377 MS
EKG R AXIS: -79 DEGREES
EKG T AXIS: 70 DEGREES
EKG VENTRICULAR RATE: 57 BPM
EOSINOPHIL # BLD: 1.2 %
EOSINOPHILS ABSOLUTE: 0 THOU/MM3 (ref 0–0.4)
ERYTHROCYTE [DISTWIDTH] IN BLOOD BY AUTOMATED COUNT: 14.6 % (ref 11.5–14.5)
ERYTHROCYTE [DISTWIDTH] IN BLOOD BY AUTOMATED COUNT: 49.8 FL (ref 35–45)
GLUCOSE BLD-MCNC: 88 MG/DL (ref 70–108)
HCT VFR BLD CALC: 35.9 % (ref 37–47)
HEMOGLOBIN: 11.2 GM/DL (ref 12–16)
IMMATURE GRANS (ABS): 0.01 THOU/MM3 (ref 0–0.07)
IMMATURE GRANULOCYTES: 0.2 %
LIPASE: 48 U/L (ref 5.6–51.3)
LYMPHOCYTES # BLD: 28.5 %
LYMPHOCYTES ABSOLUTE: 1.2 THOU/MM3 (ref 1–4.8)
MCH RBC QN AUTO: 29.4 PG (ref 26–33)
MCHC RBC AUTO-ENTMCNC: 31.2 GM/DL (ref 32.2–35.5)
MCV RBC AUTO: 94.2 FL (ref 81–99)
MONOCYTES # BLD: 13.3 %
MONOCYTES ABSOLUTE: 0.5 THOU/MM3 (ref 0.4–1.3)
NUCLEATED RED BLOOD CELLS: 0 /100 WBC
OSMOLALITY CALCULATION: 274.7 MOSMOL/KG (ref 275–300)
PLATELET # BLD: 155 THOU/MM3 (ref 130–400)
PMV BLD AUTO: 12.3 FL (ref 9.4–12.4)
POTASSIUM REFLEX MAGNESIUM: 4.1 MEQ/L (ref 3.5–5.2)
PRO-BNP: 251 PG/ML (ref 0–450)
RBC # BLD: 3.81 MILL/MM3 (ref 4.2–5.4)
SEG NEUTROPHILS: 56.3 %
SEGMENTED NEUTROPHILS ABSOLUTE COUNT: 2.3 THOU/MM3 (ref 1.8–7.7)
SODIUM BLD-SCNC: 136 MEQ/L (ref 135–145)
TOTAL PROTEIN: 6.1 G/DL (ref 6.1–8)
TROPONIN T: < 0.01 NG/ML
TROPONIN T: < 0.01 NG/ML
WBC # BLD: 4.1 THOU/MM3 (ref 4.8–10.8)

## 2019-11-17 PROCEDURE — 96375 TX/PRO/DX INJ NEW DRUG ADDON: CPT

## 2019-11-17 PROCEDURE — 93005 ELECTROCARDIOGRAM TRACING: CPT | Performed by: EMERGENCY MEDICINE

## 2019-11-17 PROCEDURE — 80048 BASIC METABOLIC PNL TOTAL CA: CPT

## 2019-11-17 PROCEDURE — 93010 ELECTROCARDIOGRAM REPORT: CPT | Performed by: INTERNAL MEDICINE

## 2019-11-17 PROCEDURE — 96374 THER/PROPH/DIAG INJ IV PUSH: CPT

## 2019-11-17 PROCEDURE — 83880 ASSAY OF NATRIURETIC PEPTIDE: CPT

## 2019-11-17 PROCEDURE — 6370000000 HC RX 637 (ALT 250 FOR IP): Performed by: EMERGENCY MEDICINE

## 2019-11-17 PROCEDURE — 36415 COLL VENOUS BLD VENIPUNCTURE: CPT

## 2019-11-17 PROCEDURE — 85025 COMPLETE CBC W/AUTO DIFF WBC: CPT

## 2019-11-17 PROCEDURE — 84484 ASSAY OF TROPONIN QUANT: CPT

## 2019-11-17 PROCEDURE — 96376 TX/PRO/DX INJ SAME DRUG ADON: CPT

## 2019-11-17 PROCEDURE — 83690 ASSAY OF LIPASE: CPT

## 2019-11-17 PROCEDURE — 71045 X-RAY EXAM CHEST 1 VIEW: CPT

## 2019-11-17 PROCEDURE — 71275 CT ANGIOGRAPHY CHEST: CPT

## 2019-11-17 PROCEDURE — 6360000002 HC RX W HCPCS: Performed by: EMERGENCY MEDICINE

## 2019-11-17 PROCEDURE — 80076 HEPATIC FUNCTION PANEL: CPT

## 2019-11-17 PROCEDURE — 6360000004 HC RX CONTRAST MEDICATION: Performed by: EMERGENCY MEDICINE

## 2019-11-17 PROCEDURE — 99285 EMERGENCY DEPT VISIT HI MDM: CPT

## 2019-11-17 RX ORDER — HYDRALAZINE HYDROCHLORIDE 25 MG/1
25 TABLET, FILM COATED ORAL 3 TIMES DAILY
Qty: 30 TABLET | Refills: 0 | Status: SHIPPED | OUTPATIENT
Start: 2019-11-17 | End: 2019-11-20 | Stop reason: ALTCHOICE

## 2019-11-17 RX ORDER — HYDRALAZINE HYDROCHLORIDE 20 MG/ML
10 INJECTION INTRAMUSCULAR; INTRAVENOUS ONCE
Status: COMPLETED | OUTPATIENT
Start: 2019-11-17 | End: 2019-11-17

## 2019-11-17 RX ORDER — MORPHINE SULFATE 2 MG/ML
2 INJECTION, SOLUTION INTRAMUSCULAR; INTRAVENOUS ONCE
Status: COMPLETED | OUTPATIENT
Start: 2019-11-17 | End: 2019-11-17

## 2019-11-17 RX ORDER — ASPIRIN 81 MG/1
324 TABLET, CHEWABLE ORAL ONCE
Status: COMPLETED | OUTPATIENT
Start: 2019-11-17 | End: 2019-11-17

## 2019-11-17 RX ADMIN — MORPHINE SULFATE 2 MG: 2 INJECTION, SOLUTION INTRAMUSCULAR; INTRAVENOUS at 03:45

## 2019-11-17 RX ADMIN — ASPIRIN 81 MG 324 MG: 81 TABLET ORAL at 03:46

## 2019-11-17 RX ADMIN — HYDRALAZINE HYDROCHLORIDE 10 MG: 20 INJECTION, SOLUTION INTRAMUSCULAR; INTRAVENOUS at 03:44

## 2019-11-17 RX ADMIN — HYDRALAZINE HYDROCHLORIDE 10 MG: 20 INJECTION INTRAMUSCULAR; INTRAVENOUS at 05:33

## 2019-11-17 RX ADMIN — IOPAMIDOL 80 ML: 755 INJECTION, SOLUTION INTRAVENOUS at 03:57

## 2019-11-17 ASSESSMENT — ENCOUNTER SYMPTOMS
COUGH: 0
STRIDOR: 0
DIARRHEA: 0
VOMITING: 0
RHINORRHEA: 0
ABDOMINAL DISTENTION: 0
BACK PAIN: 0
EYE ITCHING: 0
EYE DISCHARGE: 0
NAUSEA: 0
ABDOMINAL PAIN: 0
CHEST TIGHTNESS: 0
PHOTOPHOBIA: 0
CONSTIPATION: 0
WHEEZING: 0
SORE THROAT: 0
SHORTNESS OF BREATH: 1
EYE REDNESS: 0
EYE PAIN: 0

## 2019-11-17 ASSESSMENT — PAIN SCALES - GENERAL
PAINLEVEL_OUTOF10: 4
PAINLEVEL_OUTOF10: 0
PAINLEVEL_OUTOF10: 4

## 2019-11-17 ASSESSMENT — HEART SCORE: ECG: 1

## 2019-11-17 ASSESSMENT — PAIN DESCRIPTION - LOCATION: LOCATION: CHEST

## 2019-11-20 ENCOUNTER — OFFICE VISIT (OUTPATIENT)
Dept: CARDIOLOGY CLINIC | Age: 49
End: 2019-11-20
Payer: COMMERCIAL

## 2019-11-20 VITALS
HEART RATE: 89 BPM | WEIGHT: 185 LBS | HEIGHT: 67 IN | BODY MASS INDEX: 29.03 KG/M2 | DIASTOLIC BLOOD PRESSURE: 103 MMHG | SYSTOLIC BLOOD PRESSURE: 158 MMHG

## 2019-11-20 DIAGNOSIS — I71.21 ASCENDING AORTIC ANEURYSM: ICD-10-CM

## 2019-11-20 DIAGNOSIS — I10 ESSENTIAL HYPERTENSION: Primary | ICD-10-CM

## 2019-11-20 LAB — GFR SERPL CREATININE-BSD FRML MDRD: 32 ML/MIN/1.73M2

## 2019-11-20 PROCEDURE — G8427 DOCREV CUR MEDS BY ELIG CLIN: HCPCS | Performed by: PHYSICIAN ASSISTANT

## 2019-11-20 PROCEDURE — G8598 ASA/ANTIPLAT THER USED: HCPCS | Performed by: PHYSICIAN ASSISTANT

## 2019-11-20 PROCEDURE — G8484 FLU IMMUNIZE NO ADMIN: HCPCS | Performed by: PHYSICIAN ASSISTANT

## 2019-11-20 PROCEDURE — 4004F PT TOBACCO SCREEN RCVD TLK: CPT | Performed by: PHYSICIAN ASSISTANT

## 2019-11-20 PROCEDURE — 99213 OFFICE O/P EST LOW 20 MIN: CPT | Performed by: PHYSICIAN ASSISTANT

## 2019-11-20 PROCEDURE — G8419 CALC BMI OUT NRM PARAM NOF/U: HCPCS | Performed by: PHYSICIAN ASSISTANT

## 2019-11-20 RX ORDER — HYDRALAZINE HYDROCHLORIDE 50 MG/1
50 TABLET, FILM COATED ORAL 3 TIMES DAILY
Qty: 90 TABLET | Refills: 3 | Status: SHIPPED | OUTPATIENT
Start: 2019-11-20 | End: 2019-12-28

## 2019-11-21 ENCOUNTER — TELEPHONE (OUTPATIENT)
Dept: CARDIOLOGY CLINIC | Age: 49
End: 2019-11-21

## 2019-12-28 ENCOUNTER — HOSPITAL ENCOUNTER (EMERGENCY)
Age: 49
Discharge: HOME | End: 2019-12-28
Payer: COMMERCIAL

## 2019-12-28 VITALS
SYSTOLIC BLOOD PRESSURE: 162 MMHG | BODY MASS INDEX: 28.66 KG/M2 | WEIGHT: 183 LBS | TEMPERATURE: 98 F | OXYGEN SATURATION: 98 % | RESPIRATION RATE: 20 BRPM | HEART RATE: 74 BPM | DIASTOLIC BLOOD PRESSURE: 108 MMHG

## 2019-12-28 LAB
BILIRUBIN URINE: NEGATIVE
BLOOD, URINE: ABNORMAL
CHARACTER, URINE: CLEAR
COLOR: YELLOW
GLUCOSE, URINE: NEGATIVE MG/DL
KETONES, URINE: NEGATIVE
LEUKOCYTES, UA: NEGATIVE
NITRATE, UA: NEGATIVE
PH UA: 6 (ref 5–9)
PROTEIN UA: >= 300 MG/DL
REFLEX TO URINE C & S: ABNORMAL
SPECIFIC GRAVITY UA: >= 1.03 (ref 1–1.03)
UROBILINOGEN, URINE: 0.2 EU/DL (ref 0–1)

## 2019-12-28 PROCEDURE — 99213 OFFICE O/P EST LOW 20 MIN: CPT

## 2019-12-28 PROCEDURE — 81003 URINALYSIS AUTO W/O SCOPE: CPT

## 2019-12-28 RX ORDER — IBUPROFEN 800 MG/1
800 TABLET ORAL EVERY 8 HOURS PRN
Qty: 30 TABLET | Refills: 0 | Status: SHIPPED | OUTPATIENT
Start: 2019-12-28 | End: 2020-10-11

## 2019-12-28 RX ORDER — AMOXICILLIN AND CLAVULANATE POTASSIUM 875; 125 MG/1; MG/1
1 TABLET, FILM COATED ORAL 2 TIMES DAILY
Qty: 20 TABLET | Refills: 0 | Status: SHIPPED | OUTPATIENT
Start: 2019-12-28 | End: 2020-01-07

## 2019-12-28 RX ORDER — PREDNISONE 20 MG/1
20 TABLET ORAL 2 TIMES DAILY
Qty: 10 TABLET | Refills: 0 | Status: SHIPPED | OUTPATIENT
Start: 2019-12-28 | End: 2020-01-02

## 2019-12-28 ASSESSMENT — ENCOUNTER SYMPTOMS
TROUBLE SWALLOWING: 0
VOMITING: 0
SHORTNESS OF BREATH: 0
SORE THROAT: 0
SINUS PRESSURE: 1
DIARRHEA: 0
COUGH: 0
BACK PAIN: 1
NAUSEA: 0
RHINORRHEA: 1

## 2019-12-28 ASSESSMENT — PAIN SCALES - GENERAL: PAINLEVEL_OUTOF10: 6

## 2019-12-28 ASSESSMENT — PAIN DESCRIPTION - PAIN TYPE: TYPE: ACUTE PAIN

## 2019-12-28 ASSESSMENT — PAIN DESCRIPTION - LOCATION: LOCATION: BACK;FACE

## 2019-12-28 ASSESSMENT — PAIN DESCRIPTION - DESCRIPTORS: DESCRIPTORS: PRESSURE

## 2019-12-28 ASSESSMENT — PAIN DESCRIPTION - ORIENTATION: ORIENTATION: LOWER

## 2019-12-28 NOTE — ED TRIAGE NOTES
Pt walked to room 5. Pt here with complaints of sinus pressure, nasal drainage--green. Low back pain and around to stomach. Started hanny. Chills/sweats and body aches.

## 2019-12-28 NOTE — ED PROVIDER NOTES
breath sounds. No decreased breath sounds, wheezing, rhonchi or rales. Abdominal:      General: Bowel sounds are normal.      Palpations: Abdomen is soft. Tenderness: There is no tenderness. There is no right CVA tenderness, left CVA tenderness or guarding. Negative signs include Thorne's sign. Musculoskeletal:      Lumbar back: She exhibits tenderness and pain. She exhibits normal range of motion, no bony tenderness and no edema. Back:       Comments: Patient complains of pain across the lower back. Denies any paresthesias of the lower extremities. She does have full range of motion   Lymphadenopathy:      Head:      Right side of head: No submental, submandibular, tonsillar, preauricular, posterior auricular or occipital adenopathy. Left side of head: No submental, submandibular, tonsillar, preauricular, posterior auricular or occipital adenopathy. Cervical: No cervical adenopathy. Right cervical: No superficial, deep or posterior cervical adenopathy. Left cervical: No superficial, deep or posterior cervical adenopathy. Upper Body:      Right upper body: No supraclavicular adenopathy. Left upper body: No supraclavicular adenopathy. Skin:     General: Skin is warm and dry. Capillary Refill: Capillary refill takes less than 2 seconds. Findings: No rash. Neurological:      Mental Status: She is alert and oriented to person, place, and time. Psychiatric:         Mood and Affect: Mood normal.         Behavior: Behavior normal. Behavior is cooperative.          DIAGNOSTIC RESULTS     Labs:  Results for orders placed or performed during the hospital encounter of 12/28/19   UA without Microscopic Reflex C&S   Result Value Ref Range    Glucose, Urine Negative NEGATIVE mg/dl    Bilirubin Urine Negative NEGATIVE    Ketones, Urine Negative NEGATIVE    Specific Gravity, UA >=1.030 1.002 - 1.03    Blood, Urine Moderate (A) NEGATIVE    pH, UA 6.00 5.0 - 9.0 Protein, UA >= 300 (A) NEGATIVE mg/dl    Urobilinogen, Urine 0.20 0.0 - 1.0 eu/dl    Nitrate, UA Negative NEGATIVE    LEUKOCYTES, UA Negative NEGATIVE    Color, UA Yellow STRAW-YELL    Character, Urine Clear CLEAR-SL C    REFLEX TO URINE C & S NOT INDICATED        IMAGING:    No orders to display         EKG:      URGENT CARE COURSE:     Vitals:    12/28/19 1824   BP: (!) 162/108   Pulse: 74   Resp: 20   Temp: 98 °F (36.7 °C)   TempSrc: Temporal   SpO2: 98%   Weight: 183 lb (83 kg)       Medications - No data to display         PROCEDURES:  None    FINAL IMPRESSION      1. Acute non-recurrent maxillary sinusitis    2. Low back strain, initial encounter          DISPOSITION/ PLAN      The patient/Patient representative was advised to rest, drink lots of fluids, take Motrin and Tylenol for any fever, chills generalized body aches. The patient was also advised to monitor urine output for signs of dehydration. If the patient develops any chest pain, shortness of breath, neck pain or stiffness or abdominal pain or any other concerns, the patient is to call 911 or go to the emergency department for further evaluation. If the patient does not experience any of the above symptoms he is to follow-up with his primary care provider in 2-3 days for reevaluation. The patient/Patient representative are agreeable to the treatment plan at this time. The patient left the urgent care center in stable condition.           PATIENT REFERRED TO:  KORIN Ramirez - [place] [id number] / [facility] [id number]      DISCHARGE MEDICATIONS:  Discharge Medication List as of 12/28/2019  6:46 PM      START taking these medications    Details   amoxicillin-clavulanate (AUGMENTIN) 875-125 MG per tablet Take 1 tablet by mouth 2 times daily for 10 days, Disp-20 tablet, R-0Print      predniSONE (DELTASONE) 20 MG tablet Take 1 tablet by mouth 2 times daily for 5 days, Disp-10 tablet, R-0Print      ibuprofen (ADVIL;MOTRIN) 800 MG tablet Take

## 2020-02-05 ENCOUNTER — TELEPHONE (OUTPATIENT)
Dept: CARDIOLOGY CLINIC | Age: 50
End: 2020-02-05

## 2020-03-30 RX ORDER — AMLODIPINE BESYLATE 10 MG/1
TABLET ORAL
Qty: 90 TABLET | Refills: 3 | OUTPATIENT
Start: 2020-03-30

## 2020-04-02 RX ORDER — AMLODIPINE BESYLATE 10 MG/1
10 TABLET ORAL DAILY
Qty: 90 TABLET | Refills: 3 | Status: ON HOLD | OUTPATIENT
Start: 2020-04-02 | End: 2021-04-03 | Stop reason: SDUPTHER

## 2020-08-13 ENCOUNTER — NURSE ONLY (OUTPATIENT)
Dept: LAB | Age: 50
End: 2020-08-13

## 2020-08-13 LAB
ANION GAP SERPL CALCULATED.3IONS-SCNC: 8 MEQ/L (ref 8–16)
BUN BLDV-MCNC: 29 MG/DL (ref 7–22)
CALCIUM SERPL-MCNC: 8.9 MG/DL (ref 8.5–10.5)
CHLORIDE BLD-SCNC: 104 MEQ/L (ref 98–111)
CO2: 27 MEQ/L (ref 23–33)
CREAT SERPL-MCNC: 1.6 MG/DL (ref 0.4–1.2)
CREATININE URINE: 55.5 MG/DL
GFR SERPL CREATININE-BSD FRML MDRD: 34 ML/MIN/1.73M2
GLUCOSE BLD-MCNC: 85 MG/DL (ref 70–108)
POTASSIUM SERPL-SCNC: 4.5 MEQ/L (ref 3.5–5.2)
PROT/CREAT RATIO, UR: 0.59
PROTEIN, URINE: 32.7 MG/DL
SODIUM BLD-SCNC: 139 MEQ/L (ref 135–145)

## 2020-08-17 ENCOUNTER — TELEPHONE (OUTPATIENT)
Dept: NEPHROLOGY | Age: 50
End: 2020-08-17

## 2020-08-17 NOTE — TELEPHONE ENCOUNTER
Patient called and informed us her friend was positive for covid that she has been around. She wanted to r/s and we advised her to contact her PCP, pt understood.

## 2020-10-11 ENCOUNTER — HOSPITAL ENCOUNTER (EMERGENCY)
Age: 50
Discharge: HOME | End: 2020-10-11
Attending: EMERGENCY MEDICINE
Payer: COMMERCIAL

## 2020-10-11 VITALS
HEART RATE: 84 BPM | WEIGHT: 194 LBS | BODY MASS INDEX: 30.45 KG/M2 | RESPIRATION RATE: 16 BRPM | HEIGHT: 67 IN | SYSTOLIC BLOOD PRESSURE: 139 MMHG | TEMPERATURE: 98.3 F | DIASTOLIC BLOOD PRESSURE: 88 MMHG | OXYGEN SATURATION: 100 %

## 2020-10-11 PROCEDURE — 99212 OFFICE O/P EST SF 10 MIN: CPT

## 2020-10-11 PROCEDURE — 99214 OFFICE O/P EST MOD 30 MIN: CPT | Performed by: EMERGENCY MEDICINE

## 2020-10-11 RX ORDER — IBUPROFEN 600 MG/1
600 TABLET ORAL EVERY 6 HOURS PRN
Qty: 20 TABLET | Refills: 0 | Status: SHIPPED | OUTPATIENT
Start: 2020-10-11 | End: 2020-10-11 | Stop reason: CLARIF

## 2020-10-11 RX ORDER — TRAMADOL HYDROCHLORIDE 50 MG/1
50 TABLET ORAL EVERY 6 HOURS PRN
Qty: 12 TABLET | Refills: 0 | Status: SHIPPED | OUTPATIENT
Start: 2020-10-11 | End: 2020-10-14

## 2020-10-11 RX ORDER — CLINDAMYCIN HYDROCHLORIDE 150 MG/1
150 CAPSULE ORAL 3 TIMES DAILY
Qty: 30 CAPSULE | Refills: 0 | Status: SHIPPED | OUTPATIENT
Start: 2020-10-11 | End: 2020-10-21

## 2020-10-11 ASSESSMENT — ENCOUNTER SYMPTOMS
VOMITING: 0
STRIDOR: 0
SORE THROAT: 0
EYE DISCHARGE: 0
DIARRHEA: 0
COUGH: 0
SHORTNESS OF BREATH: 0
SINUS PRESSURE: 0
CHOKING: 0
ABDOMINAL PAIN: 0
EYE PAIN: 0
FACIAL SWELLING: 1
WHEEZING: 0
BLOOD IN STOOL: 0
BACK PAIN: 0
VOICE CHANGE: 0
EYE REDNESS: 0
CONSTIPATION: 0
NAUSEA: 0
TROUBLE SWALLOWING: 0

## 2020-10-11 ASSESSMENT — PAIN SCALES - GENERAL: PAINLEVEL_OUTOF10: 4

## 2020-10-11 ASSESSMENT — PAIN DESCRIPTION - LOCATION: LOCATION: TEETH

## 2020-10-11 ASSESSMENT — PAIN DESCRIPTION - PROGRESSION: CLINICAL_PROGRESSION: GRADUALLY WORSENING

## 2020-10-11 ASSESSMENT — PAIN DESCRIPTION - PAIN TYPE: TYPE: ACUTE PAIN

## 2020-10-11 ASSESSMENT — PAIN DESCRIPTION - FREQUENCY: FREQUENCY: CONTINUOUS

## 2020-10-11 ASSESSMENT — PAIN DESCRIPTION - ONSET: ONSET: GRADUAL

## 2020-10-11 ASSESSMENT — PAIN DESCRIPTION - DESCRIPTORS: DESCRIPTORS: ACHING;SORE

## 2020-10-11 ASSESSMENT — PAIN - FUNCTIONAL ASSESSMENT: PAIN_FUNCTIONAL_ASSESSMENT: ACTIVITIES ARE NOT PREVENTED

## 2020-10-11 NOTE — ED TRIAGE NOTES
Pt to [facility] ambulatory with right lower tooth pain. Right jaw area is swollen. This started on Friday.

## 2020-10-11 NOTE — ED PROVIDER NOTES
[address]       Chief Complaint   Patient presents with    Dental Pain     right, lower tooth       Nurses Notes reviewed and I agree except as noted in the HPI. HISTORY OF PRESENT ILLNESS   Aditi Dwyer is a 53 y.o. female who presents with 2-day history of increasingly severe right lower dental pain, and right facial swelling. She rates pain at 4 out of 10 in severity. No fever, vomiting, neck pain or neck stiffness, trismus, problems swallowing. No history of chest pain, shortness of breath, headache. History of dental caries. Unable to see her dentist due to COVID crisis. Smokes cigarettes. No history of diabetes or asthma. Last GFR 34. History of hypertension poorly controlled. REVIEW OF SYSTEMS     Review of Systems   Constitutional: Positive for appetite change and chills. Negative for fatigue, fever and unexpected weight change. HENT: Positive for dental problem and facial swelling. Negative for congestion, ear discharge, ear pain, hearing loss, nosebleeds, postnasal drip, sinus pressure, sore throat, trouble swallowing and voice change. Eyes: Negative for pain, discharge, redness and visual disturbance. Respiratory: Negative for cough, choking, shortness of breath, wheezing and stridor. Cardiovascular: Negative for chest pain and leg swelling. Gastrointestinal: Negative for abdominal pain, blood in stool, constipation, diarrhea, nausea and vomiting. Genitourinary: Negative for dysuria, flank pain, frequency, hematuria, urgency, vaginal bleeding and vaginal discharge. Musculoskeletal: Negative for arthralgias, back pain, neck pain and neck stiffness. Skin: Negative for rash. Neurological: Negative for dizziness, seizures, syncope, weakness, light-headedness and headaches. Hematological: Negative for adenopathy. Does not bruise/bleed easily.    Psychiatric/Behavioral: Negative for confusion, sleep disturbance and suicidal ideas. The patient is not nervous/anxious. All other systems reviewed and are negative. PAST MEDICAL HISTORY         Diagnosis Date    Aneurysm ([facility] [address] 2017    Eczema     all her life    Hypertension     Thyroid disease     having thyroid removed 13       SURGICAL HISTORY     Patient  has a past surgical history that includes  section (86 87 90); salpingectomy; Tubal ligation (); Tunneled venous port placement (Left, ); Thyroidectomy, partial (); Heel spur surgery (Right); and pr egd transoral biopsy single/multiple (Left, 2018). CURRENT MEDICATIONS       Discharge Medication List as of 10/11/2020  5:30 PM      CONTINUE these medications which have NOT CHANGED    Details   amLODIPine (NORVASC) 10 MG tablet Take 1 tablet by mouth daily, Disp-90 tablet,R-3Normal      atorvastatin (LIPITOR) 20 MG tablet Take 1 tablet by mouth daily, Disp-90 tablet, R-3Normal      aspirin (RA ASPIRIN EC) 81 MG EC tablet take 1 tablet by mouth once daily, Disp-90 tablet, R-3Normal      Blood Pressure Monitoring KIT 2 TIMES DAILY Starting Wed 2019, Disp-1 kit, R-0, Print             ALLERGIES     Patient is is allergic to fish-derived products. FAMILY HISTORY     Patient'sfamily history includes Cancer in her maternal grandfather and mother; Diabetes in her paternal grandfather; Heart Disease in her paternal grandfather. SOCIAL HISTORY     Patient  reports that she has been smoking cigarettes. She has a 16.50 pack-year smoking history. She has never used smokeless tobacco. She reports current alcohol use. She reports current drug use. Drug: Marijuana. PHYSICAL EXAM     ED TRIAGE VITALS  BP: 139/88, Temp: 98.3 °F (36.8 °C), Pulse: 84, Resp: 16, SpO2: 100 %  Physical Exam  Vitals signs and nursing note reviewed. Constitutional:       General: She is not in acute distress. Appearance: She is well-developed. She is not ill-appearing.       Comments: Moist membranes, normal airway   HENT:      Head: Normocephalic and atraumatic. Right Ear: Tympanic membrane and external ear normal.      Left Ear: Tympanic membrane and external ear normal.      Nose: Nose normal. No congestion or rhinorrhea. Right Sinus: No maxillary sinus tenderness or frontal sinus tenderness. Left Sinus: No maxillary sinus tenderness or frontal sinus tenderness. Mouth/Throat:      Pharynx: No oropharyngeal exudate. Eyes:      General: No scleral icterus. Right eye: No discharge. Left eye: No discharge. Extraocular Movements:      Right eye: Normal extraocular motion. Left eye: Normal extraocular motion. Conjunctiva/sclera: Conjunctivae normal.      Pupils: Pupils are equal, round, and reactive to light. Comments: Conjunctiva clear   Neck:      Musculoskeletal: Normal range of motion. No spinous process tenderness or muscular tenderness. Thyroid: No thyromegaly. Vascular: No JVD. Comments: No meningismus  Cardiovascular:      Rate and Rhythm: Normal rate and regular rhythm. Pulses: Normal pulses. Heart sounds: Normal heart sounds, S1 normal and S2 normal. No murmur. No friction rub. No gallop. Pulmonary:      Effort: Pulmonary effort is normal. No tachypnea or respiratory distress. Breath sounds: Normal breath sounds. No stridor. No decreased breath sounds, wheezing, rhonchi or rales. Comments: No cough, lungs clear  Chest:      Chest wall: No tenderness. Abdominal:      General: Bowel sounds are normal. There is no distension. Palpations: Abdomen is soft. There is no mass. Tenderness: There is no abdominal tenderness. There is no right CVA tenderness, left CVA tenderness, guarding or rebound. Comments: Soft nontender   Musculoskeletal: Normal range of motion. General: No tenderness.       Right lower leg: Normal.      Left lower leg: Normal.   Lymphadenopathy:      Cervical: No cervical adenopathy. Right cervical: No superficial cervical adenopathy. Left cervical: No superficial cervical adenopathy. Skin:     General: Skin is warm and dry. Findings: No erythema or rash. Comments: No rash or bruising   Neurological:      Mental Status: She is alert and oriented to person, place, and time. Cranial Nerves: No cranial nerve deficit. Motor: No abnormal muscle tone. Coordination: Coordination normal.      Deep Tendon Reflexes: Reflexes are normal and symmetric. Reflexes normal.      Comments: Appropriate, no focal finding   Psychiatric:         Behavior: Behavior normal.         Thought Content: Thought content normal.         Judgment: Judgment normal.         DIAGNOSTIC RESULTS   Labs: No results found for this visit on 10/11/20. IMAGING:  No orders to display     URGENT CARE COURSE:     Vitals:    10/11/20 1711 10/11/20 1730   BP: (!) 158/107 139/88   Pulse: 88 84   Resp: 16 16   Temp: 98.3 °F (36.8 °C)    TempSrc: Temporal    SpO2: 100% 100%   Weight: 194 lb (88 kg)    Height: 5' 7\" (1.702 m)        Medications - No data to display  PROCEDURES:  None  FINALIMPRESSION      1. Dental abscess    2. Facial cellulitis    3. Pain, dental    4. Dental caries    5. Elevated blood pressure reading    6. Tobacco use disorder        DISPOSITION/PLAN   DISPOSITION    Nontoxic, well-hydrated, normal airway. No airway abscess or epiglottitis, sepsis, CNS infection, pneumonia, hypoxia, bronchospasm. No ACS, CHF, ICH or CVA. Patient has dental abscess and facial cellulitis. In addition patient's blood pressure elevated. She has been poorly compliant her medication of Norvasc and hydralazine and promises to go home and take her medicine immediately. Will treat with Cleocin, tramadol, Tylenol, increased oral clear liquids, antiseptic gargles. Patient understands to avoid NSAIDs.   Patient instructed to follow-up with her PCP ASAP for reevaluation of blood pressure and renal function, and patient to follow-up with dentist ASAP for definitive care of her dental abscess. Patient given dental referral list per her request.  She understands to go to ED if worse. PATIENT REFERRED TO:  Follow-up with dentist ASAP    In 2 days  Follow-up with dentist ASAP, go to emergency if worse    DISCHARGE MEDICATIONS:  Discharge Medication List as of 10/11/2020  5:30 PM      START taking these medications    Details   clindamycin (CLEOCIN) 150 MG capsule Take 1 capsule by mouth 3 times daily for 10 days, Disp-30 capsule,R-0Print      traMADol (ULTRAM) 50 MG tablet Take 1 tablet by mouth every 6 hours as needed for Pain (Take 500 mg of Tylenol with every tramadol every 4 hours for pain) for up to 12 doses. Intended supply: 3 days.  Take lowest dose possible to manage pain caution will cause drowsiness, Disp-12 table t,R-0Print           Discharge Medication List as of 10/11/2020  5:30 PM      CONTINUE these medications which have CHANGED    Details   ibuprofen (IBU) 600 MG tablet Take 1 tablet by mouth every 6 hours as needed for Pain or Fever (Take with food for fever and pain), Disp-20 tablet,R-0Print             MD Jamin Martell MD  10/11/20 [facility] Valentin Garner MD  10/11/20 [facility] Valentin Garner MD  10/11/20 4829

## 2020-10-14 ENCOUNTER — HOSPITAL ENCOUNTER (EMERGENCY)
Age: 50
Discharge: HOME | End: 2020-10-14
Attending: EMERGENCY MEDICINE
Payer: COMMERCIAL

## 2020-10-14 VITALS
BODY MASS INDEX: 30.13 KG/M2 | SYSTOLIC BLOOD PRESSURE: 140 MMHG | HEART RATE: 110 BPM | RESPIRATION RATE: 18 BRPM | TEMPERATURE: 96.5 F | OXYGEN SATURATION: 99 % | HEIGHT: 67 IN | DIASTOLIC BLOOD PRESSURE: 88 MMHG | WEIGHT: 192 LBS

## 2020-10-14 LAB
ANION GAP SERPL CALCULATED.3IONS-SCNC: 13 MEQ/L (ref 8–16)
BASOPHILS # BLD: 0.2 %
BASOPHILS ABSOLUTE: 0 THOU/MM3 (ref 0–0.1)
BUN BLDV-MCNC: 20 MG/DL (ref 7–22)
CALCIUM SERPL-MCNC: 9.4 MG/DL (ref 8.5–10.5)
CHLORIDE BLD-SCNC: 102 MEQ/L (ref 98–111)
CO2: 22 MEQ/L (ref 23–33)
CREAT SERPL-MCNC: 2 MG/DL (ref 0.4–1.2)
EOSINOPHIL # BLD: 0.9 %
EOSINOPHILS ABSOLUTE: 0.1 THOU/MM3 (ref 0–0.4)
ERYTHROCYTE [DISTWIDTH] IN BLOOD BY AUTOMATED COUNT: 14.5 % (ref 11.5–14.5)
ERYTHROCYTE [DISTWIDTH] IN BLOOD BY AUTOMATED COUNT: 50.3 FL (ref 35–45)
GFR SERPL CREATININE-BSD FRML MDRD: 32 ML/MIN/1.73M2
GLUCOSE BLD-MCNC: 88 MG/DL (ref 70–108)
HCT VFR BLD CALC: 47 % (ref 37–47)
HEMOGLOBIN: 14.5 GM/DL (ref 12–16)
IMMATURE GRANS (ABS): 0.02 THOU/MM3 (ref 0–0.07)
IMMATURE GRANULOCYTES: 0.3 %
LYMPHOCYTES # BLD: 10.8 %
LYMPHOCYTES ABSOLUTE: 0.6 THOU/MM3 (ref 1–4.8)
MCH RBC QN AUTO: 29.3 PG (ref 26–33)
MCHC RBC AUTO-ENTMCNC: 30.9 GM/DL (ref 32.2–35.5)
MCV RBC AUTO: 94.9 FL (ref 81–99)
MONOCYTES # BLD: 8.9 %
MONOCYTES ABSOLUTE: 0.5 THOU/MM3 (ref 0.4–1.3)
NUCLEATED RED BLOOD CELLS: 0 /100 WBC
OSMOLALITY CALCULATION: 275.9 MOSMOL/KG (ref 275–300)
PLATELET # BLD: 178 THOU/MM3 (ref 130–400)
PMV BLD AUTO: 11.9 FL (ref 9.4–12.4)
POTASSIUM SERPL-SCNC: 4.1 MEQ/L (ref 3.5–5.2)
RBC # BLD: 4.95 MILL/MM3 (ref 4.2–5.4)
SEG NEUTROPHILS: 78.9 %
SEGMENTED NEUTROPHILS ABSOLUTE COUNT: 4.6 THOU/MM3 (ref 1.8–7.7)
SODIUM BLD-SCNC: 137 MEQ/L (ref 135–145)
WBC # BLD: 5.8 THOU/MM3 (ref 4.8–10.8)

## 2020-10-14 PROCEDURE — 99214 OFFICE O/P EST MOD 30 MIN: CPT

## 2020-10-14 PROCEDURE — 85025 COMPLETE CBC W/AUTO DIFF WBC: CPT

## 2020-10-14 PROCEDURE — 96375 TX/PRO/DX INJ NEW DRUG ADDON: CPT

## 2020-10-14 PROCEDURE — 80048 BASIC METABOLIC PNL TOTAL CA: CPT

## 2020-10-14 PROCEDURE — 99281 EMR DPT VST MAYX REQ PHY/QHP: CPT

## 2020-10-14 PROCEDURE — 2580000003 HC RX 258: Performed by: EMERGENCY MEDICINE

## 2020-10-14 PROCEDURE — 36415 COLL VENOUS BLD VENIPUNCTURE: CPT

## 2020-10-14 PROCEDURE — 2500000003 HC RX 250 WO HCPCS: Performed by: EMERGENCY MEDICINE

## 2020-10-14 PROCEDURE — 6360000002 HC RX W HCPCS: Performed by: EMERGENCY MEDICINE

## 2020-10-14 PROCEDURE — 96365 THER/PROPH/DIAG IV INF INIT: CPT

## 2020-10-14 RX ORDER — LIDOCAINE HYDROCHLORIDE 20 MG/ML
5 SOLUTION OROPHARYNGEAL
Qty: 100 ML | Refills: 0 | Status: ON HOLD | OUTPATIENT
Start: 2020-10-14 | End: 2021-04-03 | Stop reason: HOSPADM

## 2020-10-14 RX ORDER — CLINDAMYCIN PHOSPHATE 900 MG/50ML
900 INJECTION INTRAVENOUS ONCE
Status: COMPLETED | OUTPATIENT
Start: 2020-10-14 | End: 2020-10-14

## 2020-10-14 RX ORDER — MORPHINE SULFATE 4 MG/ML
4 INJECTION, SOLUTION INTRAMUSCULAR; INTRAVENOUS ONCE
Status: COMPLETED | OUTPATIENT
Start: 2020-10-14 | End: 2020-10-14

## 2020-10-14 RX ORDER — SODIUM CHLORIDE 9 MG/ML
INJECTION, SOLUTION INTRAVENOUS CONTINUOUS
Status: DISCONTINUED | OUTPATIENT
Start: 2020-10-14 | End: 2020-10-14 | Stop reason: HOSPADM

## 2020-10-14 RX ORDER — HYDROCODONE BITARTRATE AND ACETAMINOPHEN 5; 325 MG/1; MG/1
1 TABLET ORAL EVERY 6 HOURS PRN
Qty: 10 TABLET | Refills: 0 | Status: SHIPPED | OUTPATIENT
Start: 2020-10-14 | End: 2020-10-17

## 2020-10-14 RX ORDER — KETOROLAC TROMETHAMINE 30 MG/ML
30 INJECTION, SOLUTION INTRAMUSCULAR; INTRAVENOUS ONCE
Status: COMPLETED | OUTPATIENT
Start: 2020-10-14 | End: 2020-10-14

## 2020-10-14 RX ADMIN — CLINDAMYCIN IN 5 PERCENT DEXTROSE 900 MG: 18 INJECTION, SOLUTION INTRAVENOUS at 16:24

## 2020-10-14 RX ADMIN — MORPHINE SULFATE 4 MG: 4 INJECTION, SOLUTION INTRAMUSCULAR; INTRAVENOUS at 16:24

## 2020-10-14 RX ADMIN — SODIUM CHLORIDE: 9 INJECTION, SOLUTION INTRAVENOUS at 16:24

## 2020-10-14 RX ADMIN — KETOROLAC TROMETHAMINE 30 MG: 30 INJECTION, SOLUTION INTRAMUSCULAR; INTRAVENOUS at 16:24

## 2020-10-14 ASSESSMENT — ENCOUNTER SYMPTOMS
EYE PAIN: 0
VOMITING: 0
CONSTIPATION: 0
SHORTNESS OF BREATH: 0
NAUSEA: 0
TROUBLE SWALLOWING: 0
NAUSEA: 1
COLOR CHANGE: 0
BACK PAIN: 0
FACIAL SWELLING: 1
CHOKING: 0
ABDOMINAL PAIN: 0
COUGH: 0
SORE THROAT: 0
WHEEZING: 0
EYE DISCHARGE: 0
BLOOD IN STOOL: 0
STRIDOR: 0
SINUS PRESSURE: 0
VOICE CHANGE: 0
EYE REDNESS: 0
VOMITING: 1
DIARRHEA: 0

## 2020-10-14 ASSESSMENT — PAIN SCALES - GENERAL: PAINLEVEL_OUTOF10: 7

## 2020-10-14 ASSESSMENT — PAIN DESCRIPTION - LOCATION: LOCATION: TEETH

## 2020-10-14 NOTE — ED PROVIDER NOTES
[address] COMPLAINT       Chief Complaint   Patient presents with    Dental Pain     SEEN . TAKING ANTIBIOTIC BUT GETTING WORSE. Nurses Notes reviewed and I agree except as noted in the HPI. HISTORY OF PRESENT ILLNESS    Ewa Hidalgo is a 53 y.o. female who presents to the Emergency Department for the evaluation of dental pain, dental infection, dental abscess. Symptoms have been increasing times nearly 1 week. The patient was seen at the urgent care center 3 days ago and placed on clindamycin and tramadol for pain. Patient reports swelling has increased. Pain has increased. She is not running a fever. No body aches or chills. The patient cannot get her tooth fixed for 1 more week. The HPI was provided by the patient. REVIEW OF SYSTEMS     Review of Systems   Constitutional: Negative for chills, fatigue and fever. HENT: Positive for dental problem and facial swelling. Negative for drooling, sore throat, trouble swallowing and voice change. Gastrointestinal: Negative for abdominal pain, nausea and vomiting. Musculoskeletal: Negative for neck pain. Skin: Negative for color change. Neurological: Negative for dizziness. Psychiatric/Behavioral: Negative for behavioral problems. PAST MEDICAL HISTORY    has a past medical history of Aneurysm ([address] Eczema, Hypertension, and Thyroid disease. SURGICAL HISTORY      has a past surgical history that includes  section (86 87 90); salpingectomy; Tubal ligation (); Tunneled venous port placement (Left, ); Thyroidectomy, partial (); Heel spur surgery (Right); and pr egd transoral biopsy single/multiple (Left, 2018).     CURRENT MEDICATIONS       Discharge Medication List as of 10/14/2020  5:52 PM      CONTINUE these medications which have NOT CHANGED    Details   clindamycin (CLEOCIN) 150 MG capsule Take 1 capsule by mouth 3 times daily for 10 days, Disp-30 capsule,R-0Print      traMADol (ULTRAM) 50 MG tablet Take 1 tablet by mouth every 6 hours as needed for Pain (Take 500 mg of Tylenol with every tramadol every 4 hours for pain) for up to 12 doses. Intended supply: 3 days. Take lowest dose possible to manage pain caution will cause drowsiness, Disp-12 table t,R-0Print      amLODIPine (NORVASC) 10 MG tablet Take 1 tablet by mouth daily, Disp-90 tablet,R-3Normal      Blood Pressure Monitoring KIT 2 TIMES DAILY Starting 2019, Disp-1 kit, R-0, Print      atorvastatin (LIPITOR) 20 MG tablet Take 1 tablet by mouth daily, Disp-90 tablet, R-3Normal      aspirin (RA ASPIRIN EC) 81 MG EC tablet take 1 tablet by mouth once daily, Disp-90 tablet, R-3Normal             ALLERGIES     is allergic to fish-derived products. FAMILY HISTORY     She indicated that her mother is . She indicated that her father is . She indicated that the status of her maternal grandfather is unknown. She indicated that the status of her paternal grandfather is unknown.   family history includes Cancer in her maternal grandfather and mother; Diabetes in her paternal grandfather; Heart Disease in her paternal grandfather. SOCIAL HISTORY      reports that she has been smoking cigarettes. She has a 16.50 pack-year smoking history. She has never used smokeless tobacco. She reports current alcohol use. She reports current drug use. Drug: Marijuana. PHYSICAL EXAM     INITIAL VITALS:  height is 5' 7\" (1.702 m) and weight is 192 lb (87.1 kg). Her temporal temperature is 96.5 °F (35.8 °C). Her blood pressure is 140/88 (abnormal) and her pulse is 110. Her respiration is 18 and oxygen saturation is 99%. Physical Exam  Constitutional:       Appearance: She is normal weight. HENT:      Head: Normocephalic. Comments: Right mandibular swelling that remains at the lateral mandible. No submandibular swelling noted.   Dental abscess noted right lower premolar that has small amount of pus being expressed. Mouth/Throat:      Mouth: Mucous membranes are moist.      Pharynx: Oropharyngeal exudate present. Cardiovascular:      Rate and Rhythm: Normal rate. Pulmonary:      Effort: Pulmonary effort is normal.   Skin:     General: Skin is warm and dry. Capillary Refill: Capillary refill takes less than 2 seconds. Neurological:      General: No focal deficit present. Mental Status: She is alert. Psychiatric:         Mood and Affect: Mood normal.         Behavior: Behavior normal.          DIFFERENTIAL DIAGNOSIS:   Dental infection, pulpitis, dental abscess    DIAGNOSTIC RESULTS     EKG: All EKG's are interpreted by the Emergency Department Physician who either signs or Co-signs this chart in the absence of a cardiologist.    None    RADIOLOGY: non-plainfilm images(s) such as CT, Ultrasound and MRI are read by the radiologist.    No orders to display       LABS:     Labs Reviewed   CBC WITH AUTO DIFFERENTIAL - Abnormal; Notable for the following components:       Result Value    MCHC 30.9 (*)     RDW-SD 50.3 (*)     Lymphocytes Absolute 0.6 (*)     All other components within normal limits   BASIC METABOLIC PANEL - Abnormal; Notable for the following components:    CO2 22 (*)     CREATININE 2.0 (*)     All other components within normal limits   GLOMERULAR FILTRATION RATE, ESTIMATED - Abnormal; Notable for the following components:    Est, Glom Filt Rate 32 (*)     All other components within normal limits   ANION GAP   OSMOLALITY       EMERGENCY DEPARTMENT COURSE:   Vitals:    Vitals:    10/14/20 1344 10/14/20 1345 10/14/20 1658   BP:  (!) 142/93 (!) 140/88   Pulse: 118  110   Resp: 16  18   Temp: 96.5 °F (35.8 °C)     TempSrc: Temporal     SpO2: 99%  99%   Weight: 192 lb (87.1 kg)     Height: 5' 7\" (1.702 m)         6:52 PM EDT: The patient was seen and evaluated.     Swelling is noted to the right side of the face on the lateral surface of the mandible. No submandibular swelling. No trouble swallowing. No trismus. No problems breathing. Appropriate labs are ordered. White blood cell count within normal limits. Electrolytes essentially normal.  Patient is afebrile. Patient is given dose of clindamycin intravenously while work-up being completed. She is also treated with Toradol and morphine for pain control. MDM:  The patient has right-sided dental abscess. She has an appointment with dentist next week. Patient is given IV clindamycin in the emergency department. She will be discharged home with [place] for pain control and also viscous lidocaine for topical application to the area. She is to continue her clindamycin as previous. Follow-up with her dentist as soon as possible. Return for increased swelling, temperature 100.5 or greater, inability to open her jaw completely, new concerns. CRITICAL CARE:   None    CONSULTS:  None    PROCEDURES:  None    FINAL IMPRESSION      1. Abscessed tooth          DISPOSITION/PLAN   discharge    PATIENT REFERRED TO:  KORIN [facility]  [address] [address]  [phone]    Schedule an appointment as soon as possible for a visit       Your Dentist      as soon as possible      DISCHARGE MEDICATIONS:  Discharge Medication List as of 10/14/2020  5:52 PM      START taking these medications    Details   HYDROcodone-acetaminophen (NORCO) 5-325 MG per tablet Take 1 tablet by mouth every 6 hours as needed for Pain for up to 3 days. Intended supply: 3 days.  Take lowest dose possible to manage pain, Disp-10 tablet,R-0Print      lidocaine viscous hcl (XYLOCAINE) 2 % SOLN solution Take 5 mLs by mouth every 3 hours as needed for Dental Pain, Disp-100 mL,R-0Print             (Please note that portions of this note were completed with a voice recognition program.  Efforts were made to edit the dictations but occasionally words are mis-transcribed.)    The patient was given an opportunity to see the Emergency Attending. The patient voiced understanding that I was a Mid-LevelProvider and was in agreement with being seen independently by myself.        KORIN Poe CNP, 10/14/20, 6:57 PM      KORIN Poe CNP  10/14/20 1379

## 2020-10-14 NOTE — ED NOTES
Pt presents to ED at this time for \"abscess\" in right mouth. Pt states tooth pain starting Friday of last week, swelling beginning Saturday, and presented to Urgent Care Sunday. Pt was prescribed antibiotics and presents back to UC today for worsening swelling and pain. UC transfers pt to ED for worsening symptoms. Pt vital signs stable at this time, pt does not appear to be in acute distress.        [facility]  10/14/20 0833

## 2020-10-14 NOTE — ED NOTES
To [facility] with complaints of dental pain. States that she was seen here Sunday and given antibiotics. States it is not helping and it is getting bigger. Pt vomiting in room.       Debra Gonzalez RN  10/14/20 9134

## 2020-10-14 NOTE — ED PROVIDER NOTES
and headaches. Hematological: Negative for adenopathy. Does not bruise/bleed easily. Psychiatric/Behavioral: Negative for confusion, sleep disturbance and suicidal ideas. The patient is not nervous/anxious. All other systems reviewed and are negative. PAST MEDICAL HISTORY         Diagnosis Date    Aneurysm ([address] 2017    Eczema     all her life    Hypertension     Thyroid disease     having thyroid removed 13       SURGICAL HISTORY     Patient  has a past surgical history that includes  section (86 87 90); salpingectomy; Tubal ligation (); Tunneled venous port placement (Left, ); Thyroidectomy, partial (); Heel spur surgery (Right); and pr egd transoral biopsy single/multiple (Left, 2018). CURRENT MEDICATIONS       Previous Medications    AMLODIPINE (NORVASC) 10 MG TABLET    Take 1 tablet by mouth daily    ASPIRIN (RA ASPIRIN EC) 81 MG EC TABLET    take 1 tablet by mouth once daily    ATORVASTATIN (LIPITOR) 20 MG TABLET    Take 1 tablet by mouth daily    BLOOD PRESSURE MONITORING KIT    1 Units by Does not apply route 2 times daily    CLINDAMYCIN (CLEOCIN) 150 MG CAPSULE    Take 1 capsule by mouth 3 times daily for 10 days    TRAMADOL (ULTRAM) 50 MG TABLET    Take 1 tablet by mouth every 6 hours as needed for Pain (Take 500 mg of Tylenol with every tramadol every 4 hours for pain) for up to 12 doses. Intended supply: 3 days. Take lowest dose possible to manage pain caution will cause drowsiness       ALLERGIES     Patient is is allergic to fish-derived products. FAMILY HISTORY     Patient'sfamily history includes Cancer in her maternal grandfather and mother; Diabetes in her paternal grandfather; Heart Disease in her paternal grandfather. SOCIAL HISTORY     Patient  reports that she has been smoking cigarettes. She has a 16.50 pack-year smoking history. She has never used smokeless tobacco. She reports current alcohol use. She reports current drug use.  Drug: Marijuana. PHYSICAL EXAM     ED TRIAGE VITALS  BP: (!) 142/93, Temp: 96.5 °F (35.8 °C), Pulse: 118, Resp: 16, SpO2: 99 %  Physical Exam  Vitals signs and nursing note reviewed. Constitutional:       General: She is in acute distress. Appearance: She is well-developed. She is ill-appearing. Comments: Dry mucous membranes, airway intact no trismus   HENT:      Head: Normocephalic and atraumatic. Right Ear: Tympanic membrane and external ear normal.      Left Ear: Tympanic membrane and external ear normal.      Nose: Nose normal. No congestion or rhinorrhea. Comments: Right facial swelling     Mouth/Throat:      Pharynx: No oropharyngeal exudate. Eyes:      General: No scleral icterus. Right eye: No discharge. Left eye: No discharge. Extraocular Movements:      Right eye: Normal extraocular motion. Left eye: Normal extraocular motion. Conjunctiva/sclera: Conjunctivae normal.      Pupils: Pupils are equal, round, and reactive to light. Comments: Conjunctiva clear   Neck:      Musculoskeletal: Normal range of motion. Thyroid: No thyromegaly. Vascular: No JVD. Comments: No meningismus  Cardiovascular:      Rate and Rhythm: Regular rhythm. Tachycardia present. Pulses: Normal pulses. Heart sounds: Normal heart sounds, S1 normal and S2 normal. No murmur. No friction rub. No gallop. Pulmonary:      Effort: Pulmonary effort is normal. No respiratory distress. Breath sounds: Normal breath sounds. No stridor. No wheezing or rales. Comments: No cough, lungs clear  Chest:      Chest wall: No tenderness. Abdominal:      General: Bowel sounds are normal. There is no distension. Palpations: Abdomen is soft. There is no mass. Tenderness: There is no abdominal tenderness. There is no right CVA tenderness, left CVA tenderness, guarding or rebound. Comments: Soft nontender   Musculoskeletal: Normal range of motion. General: No tenderness. Right lower leg: Normal.      Left lower leg: Normal.   Lymphadenopathy:      Cervical: Cervical adenopathy present. Right cervical: Superficial cervical adenopathy and deep cervical adenopathy present. Left cervical: Superficial cervical adenopathy present. No deep cervical adenopathy. Skin:     General: Skin is warm and dry. Findings: No erythema or rash. Comments: No rash or bruising   Neurological:      Mental Status: She is alert and oriented to person, place, and time. Cranial Nerves: No cranial nerve deficit. Motor: No abnormal muscle tone. Coordination: Coordination normal.      Deep Tendon Reflexes: Reflexes are normal and symmetric. Reflexes normal.      Comments: Appropriate, no focal finding   Psychiatric:         Behavior: Behavior normal.         Thought Content: Thought content normal.         Judgment: Judgment normal.         DIAGNOSTIC RESULTS   Labs: No results found for this visit on 10/14/20. IMAGING:  No orders to display     URGENT CARE COURSE:     Vitals:    10/14/20 1344 10/14/20 1345   BP:  (!) 142/93   Pulse: 118    Resp: 16    Temp: 96.5 °F (35.8 °C)    TempSrc: Temporal    SpO2: 99%    Weight: 192 lb (87.1 kg)    Height: 5' 7\" (1.702 m)        Medications - No data to display  PROCEDURES:  None  FINALIMPRESSION      1. Abscessed tooth    2. Facial cellulitis    3. Nausea and vomiting in adult patient    4. Stage 3b chronic kidney disease    5. Hypertension, unspecified type    6. Tobacco use disorder        DISPOSITION/PLAN   DISPOSITION Decision To Transfer 10/14/2020 01:53:14 PM  Patient transferred to [facility] ED per her request.  She is stable for private vehicle transfer. Patient accepted in transfer by [facility] ED charge nurse at [phone].    PATIENT REFERRED TO:  to [facility] ED      to [facility] ED    DISCHARGE MEDICATIONS:  New Prescriptions    No medications on file     Current Discharge Medication List          Doyne Smoke,

## 2021-02-07 ENCOUNTER — HOSPITAL ENCOUNTER (EMERGENCY)
Age: 51
Discharge: HOME | End: 2021-02-07
Payer: COMMERCIAL

## 2021-02-07 VITALS
WEIGHT: 193 LBS | BODY MASS INDEX: 30.29 KG/M2 | TEMPERATURE: 98.2 F | RESPIRATION RATE: 16 BRPM | DIASTOLIC BLOOD PRESSURE: 108 MMHG | HEART RATE: 67 BPM | OXYGEN SATURATION: 100 % | HEIGHT: 67 IN | SYSTOLIC BLOOD PRESSURE: 162 MMHG

## 2021-02-07 DIAGNOSIS — K04.7 DENTAL ABSCESS: Primary | ICD-10-CM

## 2021-02-07 PROCEDURE — 99284 EMERGENCY DEPT VISIT MOD MDM: CPT

## 2021-02-07 PROCEDURE — 6370000000 HC RX 637 (ALT 250 FOR IP): Performed by: EMERGENCY MEDICINE

## 2021-02-07 PROCEDURE — 96365 THER/PROPH/DIAG IV INF INIT: CPT

## 2021-02-07 PROCEDURE — 2500000003 HC RX 250 WO HCPCS: Performed by: EMERGENCY MEDICINE

## 2021-02-07 PROCEDURE — 96375 TX/PRO/DX INJ NEW DRUG ADDON: CPT

## 2021-02-07 PROCEDURE — 6360000002 HC RX W HCPCS: Performed by: EMERGENCY MEDICINE

## 2021-02-07 RX ORDER — HYDROCODONE BITARTRATE AND ACETAMINOPHEN 5; 325 MG/1; MG/1
1 TABLET ORAL EVERY 6 HOURS PRN
Qty: 12 TABLET | Refills: 0 | Status: SHIPPED | OUTPATIENT
Start: 2021-02-07 | End: 2021-02-10

## 2021-02-07 RX ORDER — LIDOCAINE HYDROCHLORIDE 20 MG/ML
5 SOLUTION OROPHARYNGEAL
Qty: 100 ML | Refills: 0 | Status: ON HOLD | OUTPATIENT
Start: 2021-02-07 | End: 2021-04-01

## 2021-02-07 RX ORDER — CLINDAMYCIN PHOSPHATE 900 MG/50ML
900 INJECTION INTRAVENOUS ONCE
Status: COMPLETED | OUTPATIENT
Start: 2021-02-07 | End: 2021-02-07

## 2021-02-07 RX ORDER — LIDOCAINE HYDROCHLORIDE 20 MG/ML
10 SOLUTION OROPHARYNGEAL ONCE
Status: COMPLETED | OUTPATIENT
Start: 2021-02-07 | End: 2021-02-07

## 2021-02-07 RX ORDER — FENTANYL CITRATE 50 UG/ML
25 INJECTION, SOLUTION INTRAMUSCULAR; INTRAVENOUS ONCE
Status: COMPLETED | OUTPATIENT
Start: 2021-02-07 | End: 2021-02-07

## 2021-02-07 RX ORDER — CLINDAMYCIN HYDROCHLORIDE 150 MG/1
150 CAPSULE ORAL 3 TIMES DAILY
Qty: 21 CAPSULE | Refills: 0 | Status: SHIPPED | OUTPATIENT
Start: 2021-02-07 | End: 2021-02-14

## 2021-02-07 RX ADMIN — LIDOCAINE HYDROCHLORIDE 10 ML: 20 SOLUTION ORAL; TOPICAL at 10:43

## 2021-02-07 RX ADMIN — CLINDAMYCIN IN 5 PERCENT DEXTROSE 900 MG: 18 INJECTION, SOLUTION INTRAVENOUS at 10:46

## 2021-02-07 RX ADMIN — FENTANYL CITRATE 25 MCG: 50 INJECTION, SOLUTION INTRAMUSCULAR; INTRAVENOUS at 10:46

## 2021-02-07 ASSESSMENT — ENCOUNTER SYMPTOMS
COUGH: 0
ABDOMINAL PAIN: 0
COLOR CHANGE: 0
NAUSEA: 0
VOMITING: 0
SHORTNESS OF BREATH: 0
FACIAL SWELLING: 1
TROUBLE SWALLOWING: 1
SORE THROAT: 0

## 2021-02-07 ASSESSMENT — PAIN DESCRIPTION - FREQUENCY: FREQUENCY: CONTINUOUS

## 2021-02-07 ASSESSMENT — PAIN DESCRIPTION - ORIENTATION: ORIENTATION: RIGHT;LOWER

## 2021-02-07 ASSESSMENT — PAIN SCALES - GENERAL: PAINLEVEL_OUTOF10: 8

## 2021-02-07 NOTE — ED PROVIDER NOTES
C[facility] [address] COMPLAINT       Chief Complaint   Patient presents with    Dental Pain     right lower       Nurses Notes reviewed and I agree except as noted in the HPI. HISTORY OF PRESENT ILLNESS    Selena Reno is a 49 y.o. female who presents to the Emergency Department for the evaluation of dental pain and abscess. States there has been an abscess present for 3 months which she has visited her dentist a few times for and has received clindamycin PO. Denies that the abscess has been drained or the tooth has been extracted. States the most recent dentist appointment was 4 days ago and she was placed on more clindamycin. Reports that the abscess has not responded to this therapy and has increased in both size and pain since then. Reports she has used Tylenol for pain with no relief and denies use of NSAIDs because of concern with her creatinine being high in the past. Admits to chills but without confirmed fever, headaches, ipsilateral neck pain, odynophagia, dysphagia, and drainage. Denies nausea/vomiting, dyspnea, chest pain, or cough. Reports 1/2 PPD cigarette use and denies chewing tobacco.  States she is still brushing teeth and flossing as tolerated. The HPI was provided by the patient. REVIEW OF SYSTEMS     Review of Systems   Constitutional: Positive for chills. Negative for fever. HENT: Positive for dental problem, facial swelling and trouble swallowing. Negative for sore throat. Respiratory: Negative for cough and shortness of breath. Cardiovascular: Negative for chest pain and palpitations. Gastrointestinal: Negative for abdominal pain, nausea and vomiting. Musculoskeletal: Positive for neck pain. Negative for arthralgias, myalgias and neck stiffness. Skin: Negative for color change, pallor and rash. Neurological: Positive for headaches. Negative for weakness, light-headedness and numbness.    Hematological: Does not bruise/bleed easily. PAST MEDICAL HISTORY    has a past medical history of Aneurysm ([address] Eczema, Hypertension, and Thyroid disease. SURGICAL HISTORY      has a past surgical history that includes  section (86 87 90); salpingectomy; Tubal ligation (); Tunneled venous port placement (Left, 2013); Thyroidectomy, partial (); Heel spur surgery (Right); and pr egd transoral biopsy single/multiple (Left, 2018). CURRENT MEDICATIONS       Previous Medications    AMLODIPINE (NORVASC) 10 MG TABLET    Take 1 tablet by mouth daily    ASPIRIN (RA ASPIRIN EC) 81 MG EC TABLET    take 1 tablet by mouth once daily    ATORVASTATIN (LIPITOR) 20 MG TABLET    Take 1 tablet by mouth daily    BLOOD PRESSURE MONITORING KIT    1 Units by Does not apply route 2 times daily    LIDOCAINE VISCOUS HCL (XYLOCAINE) 2 % SOLN SOLUTION    Take 5 mLs by mouth every 3 hours as needed for Dental Pain       ALLERGIES     is allergic to fish-derived products. FAMILY HISTORY     She indicated that her mother is . She indicated that her father is . She indicated that the status of her maternal grandfather is unknown. She indicated that the status of her paternal grandfather is unknown.   family history includes Cancer in her maternal grandfather and mother; Diabetes in her paternal grandfather; Heart Disease in her paternal grandfather. SOCIAL HISTORY      reports that she has been smoking cigarettes. She has a 16.50 pack-year smoking history. She has never used smokeless tobacco. She reports current alcohol use. She reports current drug use. Drug: Marijuana. PHYSICAL EXAM     INITIAL VITALS:  height is 5' 7\" (1.702 m) and weight is 193 lb (87.5 kg). Her oral temperature is 98.2 °F (36.8 °C). Her blood pressure is 172/119 (abnormal) and her pulse is 81. Her respiration is 16 and oxygen saturation is 100%. Physical Exam  Constitutional:       General: She is not in acute distress. Appearance: She is ill-appearing. She is not toxic-appearing or diaphoretic. HENT:      Head: Atraumatic. Jaw: Tenderness, swelling (right lower jaw) and pain on movement present. Right Ear: External ear normal.      Left Ear: External ear normal.      Nose: Nose normal. No congestion or rhinorrhea. Mouth/Throat:      Mouth: Mucous membranes are moist.      Dentition: Dental abscesses (right lower jaw ) present. Pharynx: Oropharynx is clear. No pharyngeal swelling or oropharyngeal exudate. Tonsils: No tonsillar abscesses. Eyes:      Extraocular Movements: Extraocular movements intact. Conjunctiva/sclera: Conjunctivae normal.      Pupils: Pupils are equal, round, and reactive to light. Neck:      Musculoskeletal: Normal range of motion. Muscular tenderness (right lateral side) present. Cardiovascular:      Rate and Rhythm: Normal rate and regular rhythm. Pulses: Normal pulses. Heart sounds: Normal heart sounds. Pulmonary:      Effort: Pulmonary effort is normal.      Breath sounds: Normal breath sounds. Abdominal:      General: Bowel sounds are normal.      Palpations: Abdomen is soft. Tenderness: There is no abdominal tenderness. Musculoskeletal: Normal range of motion. Lymphadenopathy:      Cervical: No cervical adenopathy. Skin:     General: Skin is warm and dry. Capillary Refill: Capillary refill takes less than 2 seconds. Findings: No bruising, erythema or rash. Neurological:      Mental Status: She is alert and oriented to person, place, and time. Sensory: No sensory deficit. Psychiatric:         Mood and Affect: Mood normal. Affect is tearful (from pain). Behavior: Behavior normal.         Thought Content:  Thought content normal.         Judgment: Judgment normal.          DIFFERENTIAL DIAGNOSIS:   Dental abscess, dental infection, do not suspect Sal angina    DIAGNOSTIC RESULTS     EKG: All EKG's are interpreted by the Emergency Department Physician who either signs or Co-signs this chart in the absence of a cardiologist.    None    RADIOLOGY: non-plainfilm images(s) such as CT, Ultrasound and MRI are read by the radiologist.    No orders to display       LABS:     Labs Reviewed - No data to display    EMERGENCY DEPARTMENT COURSE:   Vitals:    Vitals:    02/07/21 1003   BP: (!) 172/119   Pulse: 81   Resp: 16   Temp: 98.2 °F (36.8 °C)   TempSrc: Oral   SpO2: 100%   Weight: 193 lb (87.5 kg)   Height: 5' 7\" (1.702 m)       10:18 AM EST: The patient was seen and evaluated. Patient has what appears to be a lower periapical abscess of one of her teeth. The area is swollen. The area spontaneously began draining exudate while in the department. I did help express some of the exudate from the abscess. Patient was given IV clindamycin, fentanyl for pain control, viscous lidocaine for pain control. MDM:  Patient has a very large periapical dental abscess. It began draining while in the department. Patient is already on clindamycin at home. I increase the dose of her clindamycin to 450 mg 3 times daily, short treatment of Norco for pain control as patient cannot take NSAIDs and other pain medications did not improve the patient's symptoms. Patient is given a prescription for viscous lidocaine to try to avoid the narcotics as much as possible. Patient is advised follow-up with her dentist tomorrow for reevaluation. Return for increased swelling, temperature 100.5 or greater, new concerns. Patient verbalized understanding of all instructions. CRITICAL CARE:   None    CONSULTS:  None    PROCEDURES:  None    FINAL IMPRESSION      1. Dental abscess          DISPOSITION/PLAN   Discharge    PATIENT REFERRED TO:  No follow-up provider specified.     DISCHARGE MEDICATIONS:  New Prescriptions    CLINDAMYCIN (CLEOCIN) 150 MG CAPSULE    Take 1 capsule by mouth 3 times daily for 7 days Take in addition to 300 mg capsules to total 450 mg three times daily    HYDROCODONE-ACETAMINOPHEN (NORCO) 5-325 MG PER TABLET    Take 1 tablet by mouth every 6 hours as needed for Pain for up to 3 days. Intended supply: 3 days. Take lowest dose possible to manage pain    LIDOCAINE VISCOUS HCL (XYLOCAINE) 2 % SOLN SOLUTION    Take 5 mLs by mouth every 3 hours as needed for Irritation       (Please note that portions of this note were completed with a voice recognition program.  Efforts were made to edit the dictations but occasionally words are mis-transcribed.)    The patient was given an opportunity to see the Emergency Attending. The patient voiced understanding that I was a Mid-LevelProvider and was in agreement with being seen independently by myself.           Nicolas Tan, APRN - CNP  02/07/21 1133

## 2021-02-07 NOTE — ED NOTES
Patient presents to ED for right lower dental pain that has been ongoing for a few months. Patient states she has been on multiple rounds of antibiotics to decrease the infection and schedules to have the tooth pulled but states they schedule her too far out from receiving antibiotics and the infection worsens again. States most recently the infection worsened approximately 4 days ago. Patient contacted her dentist to notify her she was having increased tenderness and swelling again and was placed on Clindamycin. Patient is scheduled to have the tooth pulled 2/19/21. Swelling noted to right lower jaw. Rates pain 8/10. Shows no signs of distress. Skin warm and dry. Respirations easy and unlabored.       Shilpi Enriquez RN  02/07/21 1010

## 2021-03-31 ENCOUNTER — HOSPITAL ENCOUNTER (INPATIENT)
Age: 51
LOS: 2 days | Discharge: HOME | DRG: 199 | End: 2021-04-03
Attending: EMERGENCY MEDICINE | Admitting: FAMILY MEDICINE
Payer: COMMERCIAL

## 2021-03-31 DIAGNOSIS — F17.219 CIGARETTE NICOTINE DEPENDENCE WITH NICOTINE-INDUCED DISORDER: ICD-10-CM

## 2021-03-31 DIAGNOSIS — J01.90 ACUTE NON-RECURRENT SINUSITIS, UNSPECIFIED LOCATION: ICD-10-CM

## 2021-03-31 DIAGNOSIS — R51.9 INTRACTABLE HEADACHE, UNSPECIFIED CHRONICITY PATTERN, UNSPECIFIED HEADACHE TYPE: ICD-10-CM

## 2021-03-31 DIAGNOSIS — I16.1 HYPERTENSIVE EMERGENCY: Primary | ICD-10-CM

## 2021-03-31 DIAGNOSIS — N18.9 ACUTE KIDNEY INJURY SUPERIMPOSED ON CHRONIC KIDNEY DISEASE (HCC): ICD-10-CM

## 2021-03-31 DIAGNOSIS — R07.9 CHEST PAIN, UNSPECIFIED TYPE: ICD-10-CM

## 2021-03-31 DIAGNOSIS — N17.9 ACUTE KIDNEY INJURY SUPERIMPOSED ON CHRONIC KIDNEY DISEASE (HCC): ICD-10-CM

## 2021-03-31 PROCEDURE — 96374 THER/PROPH/DIAG INJ IV PUSH: CPT

## 2021-03-31 PROCEDURE — 96372 THER/PROPH/DIAG INJ SC/IM: CPT

## 2021-03-31 PROCEDURE — 99285 EMERGENCY DEPT VISIT HI MDM: CPT

## 2021-03-31 ASSESSMENT — ENCOUNTER SYMPTOMS
NAUSEA: 0
EYE PAIN: 0
ABDOMINAL PAIN: 0
VOMITING: 0
TROUBLE SWALLOWING: 0
RHINORRHEA: 1
BLOOD IN STOOL: 0
SHORTNESS OF BREATH: 0
SINUS PRESSURE: 1
SINUS PAIN: 1
DIARRHEA: 0
CONSTIPATION: 0
COUGH: 0

## 2021-03-31 ASSESSMENT — PAIN SCALES - GENERAL: PAINLEVEL_OUTOF10: 6

## 2021-03-31 ASSESSMENT — PAIN DESCRIPTION - FREQUENCY: FREQUENCY: CONTINUOUS

## 2021-03-31 ASSESSMENT — PAIN DESCRIPTION - LOCATION: LOCATION: FACE

## 2021-03-31 NOTE — ED PROVIDER NOTES
Negative for dizziness. Psychiatric/Behavioral: Negative for dysphoric mood. The patient is not nervous/anxious. PAST MEDICAL HISTORY         Diagnosis Date    Aneurysm ([address] 2017    Eczema     all her life    Hypertension     Thyroid disease     having thyroid removed 13       SURGICAL HISTORY     Patient  has a past surgical history that includes  section (86 87 90); salpingectomy; Tubal ligation (); Tunneled venous port placement (Left, ); Thyroidectomy, partial (); Heel spur surgery (Right); and pr egd transoral biopsy single/multiple (Left, 2018). CURRENT MEDICATIONS       Previous Medications    AMLODIPINE (NORVASC) 10 MG TABLET    Take 1 tablet by mouth daily    ASPIRIN (RA ASPIRIN EC) 81 MG EC TABLET    take 1 tablet by mouth once daily    ATORVASTATIN (LIPITOR) 20 MG TABLET    Take 1 tablet by mouth daily    BLOOD PRESSURE MONITORING KIT    1 Units by Does not apply route 2 times daily    LIDOCAINE VISCOUS HCL (XYLOCAINE) 2 % SOLN SOLUTION    Take 5 mLs by mouth every 3 hours as needed for Dental Pain    LIDOCAINE VISCOUS HCL (XYLOCAINE) 2 % SOLN SOLUTION    Take 5 mLs by mouth every 3 hours as needed for Irritation       ALLERGIES     Patient is is allergic to fish-derived products. FAMILY HISTORY     Patient'sfamily history includes Cancer in her maternal grandfather and mother; Diabetes in her paternal grandfather; Heart Disease in her paternal grandfather. SOCIAL HISTORY     Patient  reports that she has been smoking cigarettes. She has a 16.50 pack-year smoking history. She has never used smokeless tobacco. She reports current alcohol use. She reports current drug use. Drug: Marijuana. PHYSICAL EXAM     ED TRIAGE VITALS  BP: (!) 190/120(out of b/p meds x 4 days), Temp: 97 °F (36.1 °C), Pulse: 70, Resp: 16, SpO2: 99 %  Physical Exam  Vitals signs and nursing note reviewed. Constitutional:       General: She is not in acute distress. Appearance: She is well-developed. She is not diaphoretic. HENT:      Head: Normocephalic and atraumatic. Right Ear: Tympanic membrane and external ear normal.      Left Ear: Tympanic membrane and external ear normal.      Nose: Congestion and rhinorrhea present. Eyes:      General: No scleral icterus. Right eye: No discharge. Left eye: No discharge. Conjunctiva/sclera: Conjunctivae normal.   Neck:      Musculoskeletal: Normal range of motion. Cardiovascular:      Rate and Rhythm: Normal rate and regular rhythm. Heart sounds: Normal heart sounds. No murmur. Pulmonary:      Effort: Pulmonary effort is normal.      Breath sounds: Normal breath sounds. Abdominal:      General: Abdomen is flat. Palpations: Abdomen is soft. Musculoskeletal: Normal range of motion. General: No swelling or tenderness. Skin:     General: Skin is warm and dry. Capillary Refill: Capillary refill takes less than 2 seconds. Findings: No erythema or rash. Neurological:      General: No focal deficit present. Mental Status: She is alert and oriented to person, place, and time. Cranial Nerves: No cranial nerve deficit. Psychiatric:         Mood and Affect: Mood normal.         Behavior: Behavior normal.         Thought Content: Thought content normal.         Judgment: Judgment normal.         DIAGNOSTIC RESULTS   Labs:No results found for this visit on 03/31/21. IMAGING:  No orders to display     URGENT CARE COURSE:     Vitals:    03/31/21 1737   BP: (!) 190/120   Pulse: 70   Resp: 16   Temp: 97 °F (36.1 °C)   TempSrc: Temporal   SpO2: 99%   Weight: 185 lb (83.9 kg)   Height: 5' 7\" (1.702 m)       Medications - No data to display  PROCEDURES:  FINALIMPRESSION      1. Hypertensive emergency    2. Thoracic aortic aneurysm without rupture ([facility] [address]    3. Intractable headache, unspecified chronicity pattern, unspecified headache type    4.  Cigarette nicotine dependence with nicotine-induced disorder        DISPOSITION/PLAN   DISPOSITION      Patient was seen and evaluated here in the urgent care. Patient was in no acute distress. Vitals signs were positive for blood pressure of 190/120. Patient symptoms have been present for the same amount of time that she has been out of her blood pressure medications. Patient very well could have sinus symptoms and infection at this time however headaches are concerning for possible endorgan damage from hypertension. Discussed with patient that she may need further evaluation with blood work and possible imaging. Of note given patient's history of thoracic aortic aneurysm patient cannot safely be discharged home. Patient to be referred to [facility] for further evaluation and treatment. Patient voiced understanding and states will present to [facility] by private car. Handoff given to Braxton larson at [facility] Patient discharged from [facility] urgent care in stable condition and instructed to present to [facility] emergency department.     PATIENT REFERRED TO:  [facility] EMERGENCY DEPT  \[facility] [place] [facility]\"" [id number]  [phone]    Go now    DISCHARGE MEDICATIONS:  New Prescriptions    No medications on file     Current Discharge Medication List          Cristian Teague, [address] [place], DO  Resident  03/31/21 5288

## 2021-03-31 NOTE — ED PROVIDER NOTES
[address]       Chief Complaint   Patient presents with    Facial Swelling     facial pain/pressure  \"i can smell the infection in my sinus\"       Nurses Notes reviewed and I agree except as noted in the HPI. HISTORY OF PRESENT ILLNESS   Vy Tirado is a 49 y.o. female who presents with facial swelling      I, Quinten Moran MD,  personally performed and participated in key or critical portions of the evaluation and management including personally performing the exam and medical decision making. I verify the the accuracy of the documentation by the resident.   Please review resident note for further details and specifics of this urgent care evaluation    Electronically signed by Yola Joshi MD on 3/31/2021 at 5:36 PM     Yola Joshi MD  03/31/21 0881

## 2021-03-31 NOTE — PROGRESS NOTES
Patient released in stable condition. Instructed to go directly to [facility] emergency department for further evaluation and treatment. Patient verbalized understanding. Ambulated, per self, to private car.

## 2021-04-01 ENCOUNTER — APPOINTMENT (OUTPATIENT)
Dept: ULTRASOUND IMAGING | Age: 51
DRG: 199 | End: 2021-04-01
Payer: COMMERCIAL

## 2021-04-01 ENCOUNTER — APPOINTMENT (OUTPATIENT)
Dept: CT IMAGING | Age: 51
DRG: 199 | End: 2021-04-01
Payer: COMMERCIAL

## 2021-04-01 PROBLEM — I16.1 HYPERTENSIVE EMERGENCY: Status: ACTIVE | Noted: 2021-04-01

## 2021-04-01 LAB
ALBUMIN SERPL-MCNC: 4 G/DL (ref 3.5–5.1)
ALP BLD-CCNC: 50 U/L (ref 38–126)
ALT SERPL-CCNC: 8 U/L (ref 11–66)
ANION GAP SERPL CALCULATED.3IONS-SCNC: 9 MEQ/L (ref 8–16)
AST SERPL-CCNC: 15 U/L (ref 5–40)
BASOPHILS # BLD: 0.7 %
BASOPHILS ABSOLUTE: 0 THOU/MM3 (ref 0–0.1)
BILIRUB SERPL-MCNC: 0.2 MG/DL (ref 0.3–1.2)
BUN BLDV-MCNC: 26 MG/DL (ref 7–22)
CALCIUM SERPL-MCNC: 9.4 MG/DL (ref 8.5–10.5)
CHLORIDE BLD-SCNC: 103 MEQ/L (ref 98–111)
CO2: 24 MEQ/L (ref 23–33)
CREAT SERPL-MCNC: 2.4 MG/DL (ref 0.4–1.2)
EKG ATRIAL RATE: 60 BPM
EKG P AXIS: 41 DEGREES
EKG P-R INTERVAL: 166 MS
EKG Q-T INTERVAL: 386 MS
EKG QRS DURATION: 74 MS
EKG QTC CALCULATION (BAZETT): 386 MS
EKG R AXIS: -75 DEGREES
EKG T AXIS: 85 DEGREES
EKG VENTRICULAR RATE: 60 BPM
EOSINOPHIL # BLD: 2.1 %
EOSINOPHILS ABSOLUTE: 0.1 THOU/MM3 (ref 0–0.4)
ERYTHROCYTE [DISTWIDTH] IN BLOOD BY AUTOMATED COUNT: 13.6 % (ref 11.5–14.5)
ERYTHROCYTE [DISTWIDTH] IN BLOOD BY AUTOMATED COUNT: 47.1 FL (ref 35–45)
GFR SERPL CREATININE-BSD FRML MDRD: 26 ML/MIN/1.73M2
GLUCOSE BLD-MCNC: 81 MG/DL (ref 70–108)
HCT VFR BLD CALC: 41.4 % (ref 37–47)
HEMOGLOBIN: 12.7 GM/DL (ref 12–16)
IMMATURE GRANS (ABS): 0 THOU/MM3 (ref 0–0.07)
IMMATURE GRANULOCYTES: 0 %
LV EF: 60 %
LVEF MODALITY: NORMAL
LYMPHOCYTES # BLD: 34.3 %
LYMPHOCYTES ABSOLUTE: 1.5 THOU/MM3 (ref 1–4.8)
MCH RBC QN AUTO: 28.9 PG (ref 26–33)
MCHC RBC AUTO-ENTMCNC: 30.7 GM/DL (ref 32.2–35.5)
MCV RBC AUTO: 94.1 FL (ref 81–99)
MONOCYTES # BLD: 10 %
MONOCYTES ABSOLUTE: 0.4 THOU/MM3 (ref 0.4–1.3)
MRSA SCREEN RT-PCR: NEGATIVE
NUCLEATED RED BLOOD CELLS: 0 /100 WBC
OSMOLALITY CALCULATION: 275.7 MOSMOL/KG (ref 275–300)
PLATELET # BLD: 165 THOU/MM3 (ref 130–400)
PMV BLD AUTO: 11.9 FL (ref 9.4–12.4)
POTASSIUM SERPL-SCNC: 4.4 MEQ/L (ref 3.5–5.2)
RBC # BLD: 4.4 MILL/MM3 (ref 4.2–5.4)
SARS-COV-2, NAAT: NOT DETECTED
SCAN OF BLOOD SMEAR: NORMAL
SEG NEUTROPHILS: 52.9 %
SEGMENTED NEUTROPHILS ABSOLUTE COUNT: 2.3 THOU/MM3 (ref 1.8–7.7)
SODIUM BLD-SCNC: 136 MEQ/L (ref 135–145)
TOTAL PROTEIN: 7.1 G/DL (ref 6.1–8)
TROPONIN T: < 0.01 NG/ML
TROPONIN T: < 0.01 NG/ML
VANCOMYCIN RESISTANT ENTEROCOCCUS: NEGATIVE
WBC # BLD: 4.3 THOU/MM3 (ref 4.8–10.8)

## 2021-04-01 PROCEDURE — 87500 VANOMYCIN DNA AMP PROBE: CPT

## 2021-04-01 PROCEDURE — 80053 COMPREHEN METABOLIC PANEL: CPT

## 2021-04-01 PROCEDURE — 6360000002 HC RX W HCPCS: Performed by: PHYSICIAN ASSISTANT

## 2021-04-01 PROCEDURE — 87635 SARS-COV-2 COVID-19 AMP PRB: CPT

## 2021-04-01 PROCEDURE — 94761 N-INVAS EAR/PLS OXIMETRY MLT: CPT

## 2021-04-01 PROCEDURE — 76770 US EXAM ABDO BACK WALL COMP: CPT

## 2021-04-01 PROCEDURE — 93005 ELECTROCARDIOGRAM TRACING: CPT | Performed by: PHYSICIAN ASSISTANT

## 2021-04-01 PROCEDURE — 70450 CT HEAD/BRAIN W/O DYE: CPT

## 2021-04-01 PROCEDURE — 87641 MR-STAPH DNA AMP PROBE: CPT

## 2021-04-01 PROCEDURE — 2500000003 HC RX 250 WO HCPCS: Performed by: PHYSICIAN ASSISTANT

## 2021-04-01 PROCEDURE — 6370000000 HC RX 637 (ALT 250 FOR IP): Performed by: STUDENT IN AN ORGANIZED HEALTH CARE EDUCATION/TRAINING PROGRAM

## 2021-04-01 PROCEDURE — 99254 IP/OBS CNSLTJ NEW/EST MOD 60: CPT | Performed by: INTERNAL MEDICINE

## 2021-04-01 PROCEDURE — 36415 COLL VENOUS BLD VENIPUNCTURE: CPT

## 2021-04-01 PROCEDURE — 93306 TTE W/DOPPLER COMPLETE: CPT

## 2021-04-01 PROCEDURE — 2580000003 HC RX 258: Performed by: STUDENT IN AN ORGANIZED HEALTH CARE EDUCATION/TRAINING PROGRAM

## 2021-04-01 PROCEDURE — 87081 CULTURE SCREEN ONLY: CPT

## 2021-04-01 PROCEDURE — 6370000000 HC RX 637 (ALT 250 FOR IP): Performed by: FAMILY MEDICINE

## 2021-04-01 PROCEDURE — 82043 UR ALBUMIN QUANTITATIVE: CPT

## 2021-04-01 PROCEDURE — 2060000000 HC ICU INTERMEDIATE R&B

## 2021-04-01 PROCEDURE — 6370000000 HC RX 637 (ALT 250 FOR IP): Performed by: PHYSICIAN ASSISTANT

## 2021-04-01 PROCEDURE — 84484 ASSAY OF TROPONIN QUANT: CPT

## 2021-04-01 PROCEDURE — 6360000002 HC RX W HCPCS: Performed by: STUDENT IN AN ORGANIZED HEALTH CARE EDUCATION/TRAINING PROGRAM

## 2021-04-01 PROCEDURE — 99233 SBSQ HOSP IP/OBS HIGH 50: CPT | Performed by: FAMILY MEDICINE

## 2021-04-01 PROCEDURE — 85025 COMPLETE CBC W/AUTO DIFF WBC: CPT

## 2021-04-01 RX ORDER — SODIUM CHLORIDE 9 MG/ML
INJECTION, SOLUTION INTRAVENOUS CONTINUOUS
Status: DISCONTINUED | OUTPATIENT
Start: 2021-04-01 | End: 2021-04-03 | Stop reason: HOSPADM

## 2021-04-01 RX ORDER — LABETALOL 20 MG/4 ML (5 MG/ML) INTRAVENOUS SYRINGE
20 ONCE
Status: COMPLETED | OUTPATIENT
Start: 2021-04-01 | End: 2021-04-01

## 2021-04-01 RX ORDER — FENTANYL CITRATE 50 UG/ML
50 INJECTION, SOLUTION INTRAMUSCULAR; INTRAVENOUS ONCE
Status: DISCONTINUED | OUTPATIENT
Start: 2021-04-01 | End: 2021-04-01

## 2021-04-01 RX ORDER — HYDRALAZINE HYDROCHLORIDE 20 MG/ML
5 INJECTION INTRAMUSCULAR; INTRAVENOUS EVERY 4 HOURS PRN
Status: DISCONTINUED | OUTPATIENT
Start: 2021-04-01 | End: 2021-04-03 | Stop reason: HOSPADM

## 2021-04-01 RX ORDER — ACETAMINOPHEN 650 MG/1
650 SUPPOSITORY RECTAL EVERY 6 HOURS PRN
Status: DISCONTINUED | OUTPATIENT
Start: 2021-04-01 | End: 2021-04-03 | Stop reason: HOSPADM

## 2021-04-01 RX ORDER — SODIUM CHLORIDE 0.9 % (FLUSH) 0.9 %
10 SYRINGE (ML) INJECTION EVERY 12 HOURS SCHEDULED
Status: DISCONTINUED | OUTPATIENT
Start: 2021-04-01 | End: 2021-04-03 | Stop reason: HOSPADM

## 2021-04-01 RX ORDER — POLYETHYLENE GLYCOL 3350 17 G/17G
17 POWDER, FOR SOLUTION ORAL DAILY PRN
Status: DISCONTINUED | OUTPATIENT
Start: 2021-04-01 | End: 2021-04-03 | Stop reason: HOSPADM

## 2021-04-01 RX ORDER — SODIUM CHLORIDE 0.9 % (FLUSH) 0.9 %
10 SYRINGE (ML) INJECTION PRN
Status: DISCONTINUED | OUTPATIENT
Start: 2021-04-01 | End: 2021-04-03 | Stop reason: HOSPADM

## 2021-04-01 RX ORDER — ATORVASTATIN CALCIUM 20 MG/1
20 TABLET, FILM COATED ORAL DAILY
Status: DISCONTINUED | OUTPATIENT
Start: 2021-04-01 | End: 2021-04-03 | Stop reason: HOSPADM

## 2021-04-01 RX ORDER — PROMETHAZINE HYDROCHLORIDE 25 MG/1
12.5 TABLET ORAL EVERY 6 HOURS PRN
Status: DISCONTINUED | OUTPATIENT
Start: 2021-04-01 | End: 2021-04-03 | Stop reason: HOSPADM

## 2021-04-01 RX ORDER — ASPIRIN 81 MG/1
81 TABLET ORAL DAILY
Status: DISCONTINUED | OUTPATIENT
Start: 2021-04-01 | End: 2021-04-03 | Stop reason: HOSPADM

## 2021-04-01 RX ORDER — KETOROLAC TROMETHAMINE 30 MG/ML
30 INJECTION, SOLUTION INTRAMUSCULAR; INTRAVENOUS ONCE
Status: COMPLETED | OUTPATIENT
Start: 2021-04-01 | End: 2021-04-01

## 2021-04-01 RX ORDER — SODIUM CHLORIDE 9 MG/ML
25 INJECTION, SOLUTION INTRAVENOUS PRN
Status: DISCONTINUED | OUTPATIENT
Start: 2021-04-01 | End: 2021-04-03 | Stop reason: HOSPADM

## 2021-04-01 RX ORDER — TRAMADOL HYDROCHLORIDE 50 MG/1
25 TABLET ORAL ONCE
Status: COMPLETED | OUTPATIENT
Start: 2021-04-01 | End: 2021-04-01

## 2021-04-01 RX ORDER — ACETAMINOPHEN 325 MG/1
650 TABLET ORAL EVERY 6 HOURS PRN
Status: DISCONTINUED | OUTPATIENT
Start: 2021-04-01 | End: 2021-04-03 | Stop reason: HOSPADM

## 2021-04-01 RX ORDER — AMLODIPINE BESYLATE 10 MG/1
10 TABLET ORAL DAILY
Status: DISCONTINUED | OUTPATIENT
Start: 2021-04-02 | End: 2021-04-03 | Stop reason: HOSPADM

## 2021-04-01 RX ORDER — AMLODIPINE BESYLATE 10 MG/1
10 TABLET ORAL ONCE
Status: COMPLETED | OUTPATIENT
Start: 2021-04-01 | End: 2021-04-01

## 2021-04-01 RX ORDER — ACETAMINOPHEN 500 MG
1000 TABLET ORAL EVERY 6 HOURS PRN
COMMUNITY

## 2021-04-01 RX ORDER — ONDANSETRON 2 MG/ML
4 INJECTION INTRAMUSCULAR; INTRAVENOUS EVERY 6 HOURS PRN
Status: DISCONTINUED | OUTPATIENT
Start: 2021-04-01 | End: 2021-04-03 | Stop reason: HOSPADM

## 2021-04-01 RX ADMIN — AMLODIPINE BESYLATE 10 MG: 10 TABLET ORAL at 09:44

## 2021-04-01 RX ADMIN — KETOROLAC TROMETHAMINE 30 MG: 30 INJECTION, SOLUTION INTRAMUSCULAR; INTRAVENOUS at 09:44

## 2021-04-01 RX ADMIN — ENOXAPARIN SODIUM 40 MG: 40 INJECTION SUBCUTANEOUS at 18:48

## 2021-04-01 RX ADMIN — ACETAMINOPHEN 650 MG: 325 TABLET ORAL at 16:13

## 2021-04-01 RX ADMIN — TRAMADOL HYDROCHLORIDE 25 MG: 50 TABLET, FILM COATED ORAL at 21:51

## 2021-04-01 RX ADMIN — LABETALOL 20 MG/4 ML (5 MG/ML) INTRAVENOUS SYRINGE 20 MG: at 12:22

## 2021-04-01 RX ADMIN — SODIUM CHLORIDE: 9 INJECTION, SOLUTION INTRAVENOUS at 18:54

## 2021-04-01 RX ADMIN — ATORVASTATIN CALCIUM 20 MG: 20 TABLET, FILM COATED ORAL at 20:25

## 2021-04-01 RX ADMIN — ASPIRIN 81 MG: 81 TABLET, COATED ORAL at 18:48

## 2021-04-01 ASSESSMENT — PAIN SCALES - GENERAL
PAINLEVEL_OUTOF10: 5
PAINLEVEL_OUTOF10: 2
PAINLEVEL_OUTOF10: 7
PAINLEVEL_OUTOF10: 2
PAINLEVEL_OUTOF10: 6

## 2021-04-01 ASSESSMENT — ENCOUNTER SYMPTOMS
ABDOMINAL PAIN: 1
DIARRHEA: 0
EYE ITCHING: 0
SORE THROAT: 1
BACK PAIN: 0
SINUS PRESSURE: 1
CONSTIPATION: 0
EYE REDNESS: 0
ABDOMINAL PAIN: 0
DIARRHEA: 1
VOMITING: 0
BACK PAIN: 1
RHINORRHEA: 0
EYE DISCHARGE: 0
SINUS PAIN: 1
COUGH: 0
SHORTNESS OF BREATH: 0
NAUSEA: 0
FACIAL SWELLING: 1

## 2021-04-01 ASSESSMENT — PAIN DESCRIPTION - PROGRESSION
CLINICAL_PROGRESSION: RAPIDLY IMPROVING
CLINICAL_PROGRESSION: GRADUALLY IMPROVING

## 2021-04-01 ASSESSMENT — PAIN DESCRIPTION - FREQUENCY
FREQUENCY: CONTINUOUS
FREQUENCY: CONTINUOUS

## 2021-04-01 ASSESSMENT — PAIN DESCRIPTION - DESCRIPTORS
DESCRIPTORS: ACHING
DESCRIPTORS: ACHING;SHARP
DESCRIPTORS: ACHING

## 2021-04-01 ASSESSMENT — PAIN DESCRIPTION - ORIENTATION
ORIENTATION: MID
ORIENTATION: MID
ORIENTATION: LEFT

## 2021-04-01 ASSESSMENT — PAIN DESCRIPTION - ONSET
ONSET: ON-GOING
ONSET: ON-GOING

## 2021-04-01 ASSESSMENT — PAIN - FUNCTIONAL ASSESSMENT
PAIN_FUNCTIONAL_ASSESSMENT: ACTIVITIES ARE NOT PREVENTED
PAIN_FUNCTIONAL_ASSESSMENT: ACTIVITIES ARE NOT PREVENTED

## 2021-04-01 ASSESSMENT — PAIN DESCRIPTION - PAIN TYPE
TYPE: ACUTE PAIN

## 2021-04-01 ASSESSMENT — PAIN DESCRIPTION - LOCATION
LOCATION: HEAD
LOCATION: HEAD

## 2021-04-01 NOTE — ED NOTES
Echo performed at this time. Patient respirations are regular and unlabored. Patient appears to be in no current distress. Patient VSS. Call light is within reach. Patient expresses no needs at this time.        Dio Cesar RN  04/01/21 6246

## 2021-04-01 NOTE — ED PROVIDER NOTES
[facility] [address] [address] EMERGENCY DEPT      CHIEF COMPLAINT       Chief Complaint   Patient presents with    Facial Swelling     facial pain/pressure  \"i can smell the infection in my sinus\"    Nasal Congestion    Hypertension       Nurses Notes reviewed and I agree except as noted in the HPI. HISTORY OF PRESENT ILLNESS    Manda Richards is a 49 y.o. female who presents for sinusitis. Patient reports for the last 4 to 5 days \"I think I have a real bad sinus infection. \"  She has had pain in her face, head, arms, and legs. She feels as if her face is swollen and describes the facial pain as a pressure. She has had green and yellow sinus drainage, sore throat, and bilateral ear pain. Intermittently there is lightheadedness. There have been chills but no fever. She had diarrhea for 2 days but that resolved. Last night she did not sleep well due to her pain. Patient has been out of her blood pressure medication for the past 1 week. [facility] would not refill her medicines without an appointment (scheduled in a 1 month) and she was directed to urgent care. She went to urgent care yesterday who would also not refill her blood pressure medications. They directed her to the ER if her symptoms worsened. Patient indeed feels her symptoms are worse today prompting her ER visit. The patient denies chest pain, shortness of breath, nausea, vomiting, or other complaints. Patient takes amlodipine 10 mg daily    REVIEW OF SYSTEMS     Review of Systems   Constitutional: Positive for chills and fatigue. Negative for activity change, appetite change and fever. HENT: Positive for congestion, ear pain, facial swelling, postnasal drip, sinus pressure, sinus pain and sore throat. Negative for rhinorrhea. Eyes: Negative for discharge, itching and visual disturbance. Respiratory: Negative for cough and shortness of breath. Cardiovascular: Negative for chest pain. Gastrointestinal: Positive for diarrhea. grandfather. SOCIAL HISTORY    reports that she has been smoking cigarettes. She has a 16.50 pack-year smoking history. She has never used smokeless tobacco. She reports current alcohol use. She reports current drug use. Drug: Marijuana. PHYSICAL EXAM     INITIAL VITALS:  height is 5' 7\" (1.702 m) and weight is 185 lb (83.9 kg). Her oral temperature is 98.5 °F (36.9 °C). Her blood pressure is 184/121 (abnormal) and her pulse is 72. Her respiration is 16 and oxygen saturation is 100%. Physical Exam  Vitals signs and nursing note reviewed. Constitutional:       General: She is not in acute distress. Appearance: She is well-developed. She is not toxic-appearing or diaphoretic. HENT:      Head: Normocephalic and atraumatic. Jaw: No trismus. Right Ear: Tympanic membrane and ear canal normal. No drainage. Left Ear: Tympanic membrane and ear canal normal. No drainage. Nose: No rhinorrhea. Right Sinus: Maxillary sinus tenderness and frontal sinus tenderness present. Left Sinus: Maxillary sinus tenderness and frontal sinus tenderness present. Mouth/Throat:      Lips: Pink. Mouth: Mucous membranes are moist.      Pharynx: Uvula midline. No oropharyngeal exudate or posterior oropharyngeal erythema. Tonsils: No tonsillar abscesses. Eyes:      General: Lids are normal.         Right eye: No discharge. Left eye: No discharge. Conjunctiva/sclera: Conjunctivae normal.      Pupils: Pupils are equal, round, and reactive to light. Neck:      Musculoskeletal: Normal range of motion. No neck rigidity. Trachea: Trachea normal.   Cardiovascular:      Rate and Rhythm: Normal rate and regular rhythm. Heart sounds: Normal heart sounds. Pulmonary:      Effort: Pulmonary effort is normal. No respiratory distress. Breath sounds: Normal breath sounds. No decreased breath sounds, wheezing, rhonchi or rales.    Abdominal:      General: There is no 14-DEC-2012)  Abnormal ECG  When compared with ECG of 17-NOV-2019 02:52,  No significant change was found       RADIOLOGY: non-plain film images(s) such as CT,Ultrasound and MRI are read by the radiologist.  Plain radiographic images are visualized and preliminarily interpreted by the emergency physician unless otherwise stated below. CT HEAD WO CONTRAST    (Results Pending)       LABS:   Labs Reviewed   CBC WITH AUTO DIFFERENTIAL   COMPREHENSIVE METABOLIC PANEL   TROPONIN       EMERGENCY DEPARTMENT COURSE:   Vitals:    Vitals:    03/31/21 1737 04/01/21 0822   BP: (!) 190/120 (!) 184/121   Pulse: 70 72   Resp: 16 16   Temp: 97 °F (36.1 °C) 98.5 °F (36.9 °C)   TempSrc: Temporal Oral   SpO2: 99% 100%   Weight: 185 lb (83.9 kg) 185 lb (83.9 kg)   Height: 5' 7\" (1.702 m) 5' 7\" (1.702 m)     1133: Consulted Dr. Sean Stokes in regards to admission. Notified that Dr. Felipe Velez would take the admission    1147: Esteban Brunson and notified him of admission    MDM:  The patient was seen by me in the emergency room for concerns for sinusitis and uncontrolled hypertension due to being out of her medications. Physical exam revealed a neurologically intact patient who was non-toxic appearing. Vital signs reviewed and noted stable, although blood pressure was very elevated. Old records were reviewed. Appropriate laboratory and imaging studies were ordered and reviewed upon completion. Pertinent findings: normal head CT. CRT 2.4    Interventions: Amlodipine 10mg oral; labetalol 20mg IV, toradol 30 mg IV (the latter given before crt was seen)     Reexamination: The patient has remained stable in the ER with good vital signs, with exception to elevated blood pressure. On re-exam, the patient was non-toxic appearing and maintained intact neurological status. No distress was apparent. There have been no signs of sinister etiology for patient's headache.     Results were discussed with the patient as well as desire for admission and they were amenable. Dr. Sol Merida of our hospitalists' service was consulted and graciously accepted admission. The patient was admitted to the hospital in fair condition. CRITICAL CARE:   None    CONSULTS:  Dr. Karely Clarke, Dr. Sol Merida (hospitalist)  Dr. Sudha Owens (nephrology)    PROCEDURES:  None    FINAL IMPRESSION      1. Hypertensive emergency    2. Thoracic aortic aneurysm without rupture ([address]    3. Intractable headache, unspecified chronicity pattern, unspecified headache type    4. Cigarette nicotine dependence with nicotine-induced disorder          DISPOSITION/PLAN     1. Hypertensive emergency    2. Thoracic aortic aneurysm without rupture ([address]    3. Intractable headache, unspecified chronicity pattern, unspecified headache type    4.  Cigarette nicotine dependence with nicotine-induced disorder    admission    (Please note that portions of this note were completed with a voice recognition program.  Efforts were made to edit the dictations but occasionally words are mis-transcribed.)    Giles Singleton PA-C 04/01/21 9:04 AM    HODAN Bender Ace, PA-C  04/03/21 7525

## 2021-04-01 NOTE — PROGRESS NOTES
Pt admitted to  4K9 from ED. Complaints: Facial swelling, high blood pressure. IV none infusing into the forearm left, condition patent and no redness at a rate of 0 mls/ hour with about 0 mls in the bag still. IV site free of s/s of infection or infiltration. Vital signs obtained. Assessment and data collection initiated. Two nurse skin assessment performed by Talib Robbins and Ayanna MOON. Oriented to room. Policies and procedures for  explained. All questions answered with no further questions at this time. Fall prevention and safety brochure discussed with patient. Bed alarm on. Call light in reach. Would you like your Primary Care Physician notified? No   The best day to schedule a follow up Dr appointment is:  Monday a.m.

## 2021-04-01 NOTE — CONSULTS
History:  Past Surgical History:   Procedure Laterality Date     SECTION  86 87 90    HEEL SPUR SURGERY Right     MO EGD TRANSORAL BIOPSY SINGLE/MULTIPLE Left 2018    EGD BIOPSY performed by Kushal Gray MD at [facility]    SALPINGECTOMY      tubal pregnancy    THYROIDECTOMY, PARTIAL  2011    TUBAL LIGATION  2010    TUNNELED VENOUS PORT PLACEMENT Left     mediport in chest       Family History:  Family History   Problem Relation Age of Onset    Cancer Mother     Cancer Maternal Grandfather     Diabetes Paternal Grandfather     Heart Disease Paternal Grandfather        Social History:  Social History     Socioeconomic History    Marital status: Single     Spouse name: Not on file    Number of children: 3    Years of education: 15    Highest education level: Not on file   Occupational History    Occupation: unemployed   Social Needs    Financial resource strain: Not on file    Food insecurity     Worry: Not on file     Inability: Not on file   [employer] needs     Medical: Not on file     Non-medical: Not on file   Tobacco Use    Smoking status: Current Every Day Smoker     Packs/day: 0.50     Years: 33.00     Pack years: 16.50     Types: Cigarettes    Smokeless tobacco: Never Used   Substance and Sexual Activity    Alcohol use: Yes     Comment: social, occasionally    Drug use: Yes     Types: Marijuana    Sexual activity: Yes     Partners: Male   Lifestyle    Physical activity     Days per week: Not on file     Minutes per session: Not on file    Stress: Not on file   Relationships    Social connections     Talks on phone: Not on file     Gets together: Not on file     Attends Yazdanism service: Not on file     Active member of club or organization: Not on file     Attends meetings of clubs or organizations: Not on file     Relationship status: Not on file    Intimate partner violence     Fear of current or ex partner: Not on file     Emotionally abused: Not on file     Physically abused: Not on file     Forced sexual activity: Not on file   Other Topics Concern    Not on file   Social History Narrative    Not on file       Home Meds:  Prior to Admission medications    Medication Sig Start Date End Date Taking? Authorizing Provider   amLODIPine (NORVASC) 10 MG tablet Take 1 tablet by mouth daily 4/2/20  Yes Yaneth Walters MD   atorvastatin (LIPITOR) 20 MG tablet Take 1 tablet by mouth daily 2/25/19  Yes Conner You MD   aspirin (RA ASPIRIN EC) 81 MG EC tablet take 1 tablet by mouth once daily 2/25/19  Yes Conner You MD   lidocaine viscous hcl (XYLOCAINE) 2 % SOLN solution Take 5 mLs by mouth every 3 hours as needed for Irritation 2/7/21   Duwaine Rack, APRN - CNP   lidocaine viscous hcl (XYLOCAINE) 2 % SOLN solution Take 5 mLs by mouth every 3 hours as needed for Dental Pain 10/14/20   Duwaine Rack, APRN - CNP   Blood Pressure Monitoring KIT 1 Units by Does not apply route 2 times daily 11/20/19   Ayaan Kohli PA-C       Review of Systems:  Constitutional: Positive for headaches, facial pain, congestion  Head: Positive for headaches  Eyes: Negative for blurry vision or discharge  Ears: Negative for ear pain or hearing changes  Nose: Positive for congestion  Respiratory: Negative for shortness of breath. Negative for cough or sputum production. Negative for hemoptysis  Cardiovascular: Negative for chest pain  GI: Positive for nausea, 1 episode of vomiting without diarrhea.   Negative for hematochezia and melena  : Negative for discharge, dysuria, or hematuria  Skin: Negative for rash  Musculoskeletal: Negative for joint pain, moves all ext  Neuro: Negative for numbness or tingling, negative for slurred speech  Psychiatric: Reports stable mood, negative for depression or insomnia    All other review of systems were reviewed and negative    Current Meds:  Infusion:    sodium chloride      sodium chloride       Meds:    aspirin  81 mg Oral Daily    atorvastatin  20 mg Oral Daily    sodium chloride flush  10 mL Intravenous 2 times per day    enoxaparin  40 mg Subcutaneous Daily    [START ON 4/2/2021] amLODIPine  10 mg Oral Daily     Meds prn: sodium chloride flush, sodium chloride, promethazine **OR** ondansetron, polyethylene glycol, acetaminophen **OR** acetaminophen     Allergies/Intolerances: ALLERGIES: Fish allergy and Fish-derived products    24HR INTAKE/OUTPUT:  No intake or output data in the 24 hours ending 04/01/21 1611  No intake/output data recorded. No intake/output data recorded. Admission weight: 185 lb (83.9 kg)  Wt Readings from Last 3 Encounters:   04/01/21 185 lb (83.9 kg)   02/07/21 193 lb (87.5 kg)   10/14/20 192 lb (87.1 kg)     Body mass index is 28.98 kg/m². Physical Examination:  VITALS:   Vitals:    04/01/21 1252 04/01/21 1416 04/01/21 1524 04/01/21 1531   BP: (!) 131/93 (!) 133/97 (!) 141/98    Pulse: 78 76 74    Resp: 16 18 16    Temp:       TempSrc:       SpO2: 99% 99% 100% 99%   Weight:       Height:         Weight:   Wt Readings from Last 3 Encounters:   04/01/21 185 lb (83.9 kg)   02/07/21 193 lb (87.5 kg)   10/14/20 192 lb (87.1 kg)     Constitutional and General Appearance: alert and cooperative with exam, appears comfortable, no distress, not diaphoretic  Eyes: no icteric sclera in left eye or right eye,  no pallor conjunctiva in left or right eye, no discharge seen from left eye or right eye  Ears and Nose: normal external appearance of left and right ear. Normal external appearance of nose. No active drainage from nose.    Neck: No jugular venous distention  Lungs: Air entry B/L, no crackles or rales, no use of accessory muscles or labored breathing  Heart: regular rate, S1, S2  Extremities: No LE edema, no tenderness  GI: soft, non-tender, no guarding, no distention  Skin: no rash seen on exposed extremities, warm to touch  Musculo: moves all extremities  Neuro: no slurred speech, no facial drooping  Psychiatric: Normal mood and affect, Not agitated    Lab Data  CBC:   Recent Labs     04/01/21  0903   WBC 4.3*   HGB 12.7   HCT 41.4        BMP:  Recent Labs     04/01/21  0903      K 4.4      CO2 24   BUN 26*   CREATININE 2.4*   GLUCOSE 81   CALCIUM 9.4     Hepatic:   Recent Labs     04/01/21 0903   LABALBU 4.0   AST 15   ALT 8*   BILITOT 0.2*   ALKPHOS 50       Additional Labs: CT head unremarkable    Old labs and diagnostics reviewed. Echo: October 7757 normal systolic function  Labs: Creatinine 1.6-1.7 baseline August 2020      Impression and Plan:  1. TERESA on CKD 3B versus progressive CKD 3. Has history of hypertension. Has been out of her medications and blood pressure has been suboptimally controlled at this time. Send UA, urine sodium, urine protein creatinine ratio. Gentle IV fluid hydration. Follow ultrasound although doubt any obstruction. 2. Hypertensive urgency. Patient was out of her medications. Resume home medications. Titrate/add medications as needed. Will consider adding ACE inhibitors. 3. History of substance use. Check urine for drug screen  4. Previous history of NSAID use  5. History of smoking    Thank you for the consult. Please feel free to call me if you have any questions.    Plan of care reviewed with patient and discussed with the family member at bedside    Temo Lucero MD  [facility]

## 2021-04-01 NOTE — ED NOTES
Patient up in bed at this time and expresses slight relief from headache at this time. Patient VSS. Respirations are regular and unlabored, patient is alert and oriented x4. Patient bed rails up x2 and call light within reach. Will continue to monitor.        Edy Lazcano RN  04/01/21 6582

## 2021-04-01 NOTE — ED NOTES
ED to inpatient nurses report    Chief Complaint   Patient presents with    Facial Swelling     facial pain/pressure  \"i can smell the infection in my sinus\"    Nasal Congestion    Hypertension      Present to ED from home  LOC: alert and orientated to name, place, date  Vital signs   Vitals:    04/01/21 1013 04/01/21 1150 04/01/21 1252 04/01/21 1416   BP: (!) 181/100 (!) 198/125 (!) 131/93 (!) 133/97   Pulse: 78 80 78 76   Resp: 16 18 16 18   Temp:       TempSrc:       SpO2: 100% 100% 99% 99%   Weight:       Height:          Oxygen Baseline RA    Current needs required NONE Bipap/Cpap No  LDAs:    Mobility: Independent  Pending ED orders: Complete  Present condition: Stable      Electronically signed by Sera Bello RN on 4/1/2021 at 3:13 PM       Sera Bello [facility]  04/01/21 [address] [place] [address]

## 2021-04-01 NOTE — ED NOTES
Provider notified of patient blood pressure. Patient VSS. Respirations are regular and unlabored, patient is alert and oriented x4. Patient bed rails up x2 and call light within reach. Will continue to monitor.        Maximiliano Mckenzie RN  04/01/21 0967

## 2021-04-01 NOTE — ED NOTES
Bed: 019A  Expected date: 4/1/21  Expected time: 8:09 AM  Means of arrival: [facility]  Comments:     Mirela Norris RN  04/01/21 5725

## 2021-04-01 NOTE — H&P
pain/pressure  \"i can smell the infection in my sinus\"    Nasal Congestion    Hypertension         History Of Present Illness:    49 y.o. female  with Essential HTN thoracic ascending aneurysm, who presented to [address] [place] [address] with headache, facial pain, congestion and body aches. She went to urgent care on 3/31 and was directed to go to ED that day but she notes that she was taking care of a family member and was not able to come in. Patient notes leg pain, headaches, congestion for 5 days. She c/o green/yellow drainage for a couple of days. She notes the headaches are bilateral. She notes \"I can smell the infection in my sinus\". She was out of her amlodipine for one week. She does not check her BP at home. She notes that she only takes amlodipine. She notes her abdomen feels uncomfortable for around 5 days. She had diarrhea for one or two days. She notes some back pain that she describes as achey. She denies any urinary symptoms; however, she does complain of pain with sex. She notes that she was supposed to get a CT of the chest done about six months ago but did not follow up for her thoracic aneurysm. Past Medical History:          Diagnosis Date    Aneurysm ([address] 2017    Eczema     all her life    Hypertension     Thyroid disease     having thyroid removed 13       Past Surgical History:          Procedure Laterality Date     SECTION  86 87 90    HEEL SPUR SURGERY Right     [place] EGD TRANSORAL BIOPSY SINGLE/MULTIPLE Left 2018    EGD BIOPSY performed by Genoveva Clement MD at [facility]    SALPINGECTOMY      tubal pregnancy    THYROIDECTOMY, PARTIAL  2011    TUBAL LIGATION  2010    TUNNELED VENOUS PORT PLACEMENT Left     mediport in chest       Medications Prior to Admission:      Prior to Admission medications    Medication Sig Start Date End Date Taking?  Authorizing Provider   amLODIPine (NORVASC) 10 MG tablet Take 1 tablet by mouth daily 4/2/20  Yes Rosina Rahman MD   atorvastatin (LIPITOR) 20 MG tablet Take 1 tablet by mouth daily 2/25/19  Yes Kapil Polk MD   aspirin (RA ASPIRIN EC) 81 MG EC tablet take 1 tablet by mouth once daily 2/25/19  Yes Kapil Polk MD   lidocaine viscous hcl (XYLOCAINE) 2 % SOLN solution Take 5 mLs by mouth every 3 hours as needed for Irritation 2/7/21   KORIN Hill - CNP   lidocaine viscous hcl (XYLOCAINE) 2 % SOLN solution Take 5 mLs by mouth every 3 hours as needed for Dental Pain 10/14/20   KORIN Hlil - CNP   Blood Pressure Monitoring KIT 1 Units by Does not apply route 2 times daily 11/20/19   Clinton Ruelas PA-C       Allergies:  Fish-derived products    Social History:      The patient currently lives at home    TOBACCO:   reports that she has been smoking cigarettes. She has a 16.50 pack-year smoking history. She has never used smokeless tobacco.  ETOH:   reports current alcohol use. Family History:    Positive as follows:        Problem Relation Age of Onset    Cancer Mother     Cancer Maternal Grandfather     Diabetes Paternal Grandfather     Heart Disease Paternal Grandfather        Diet:  No diet orders on file    REVIEW OF SYSTEMS:     Review of Systems   Constitutional: Negative for chills and fever. HENT: Positive for congestion and sinus pain. Negative for rhinorrhea. Eyes: Negative for redness and visual disturbance. Respiratory: Negative for cough and shortness of breath. Cardiovascular: Negative for chest pain and leg swelling. Gastrointestinal: Positive for abdominal pain. Negative for constipation, diarrhea and vomiting. Genitourinary: Positive for vaginal pain (with sex). Negative for dysuria and hematuria. Musculoskeletal: Positive for back pain. Negative for myalgias. Skin: Negative for rash and wound. Neurological: Positive for headaches. Hematological: Does not bruise/bleed easily.    Psychiatric/Behavioral: Negative for sleep Marietta Quijanojorge luis in the last 72 hours. Urinalysis:      Lab Results   Component Value Date    NITRU NEGATIVE 05/09/2019    WBCUA NONE SEEN 05/09/2019    BACTERIA NONE 05/09/2019    RBCUA NONE SEEN 05/09/2019    BLOODU Moderate 12/28/2019    SPECGRAV >=1.030 12/28/2019    GLUCOSEU NEGATIVE 05/09/2019       Intake & Output:  No intake/output data recorded. No intake/output data recorded. Radiology:     EKG:  I have reviewed the EKG with the following interpretation: NSR, LAD    CT HEAD WO CONTRAST   Final Result   1. Unremarkable noncontrast CT head. **This report has been created using voice recognition software. It may contain minor errors which are inherent in voice recognition technology. **      Final report electronically signed by Dr. Wilmer Wise on 4/1/2021 10:21 AM          DVT prophylaxis: [x] Lovenox                                 [] SCDs                                 [] SQ Heparin                                 [] Encourage ambulation           [] Already on Anticoagulation    Code Status: Prior      PT/OT Eval Status:     Disposition:    [x] Home       [] TCU       [] Rehab       [] Psych       [] SNF       [] [place]       [] Other-    Thank you Shiraz Tim APRN - RICHARD for the opportunity to be involved in this patient's care.     Electronically signed by Serafin Swift DO on 4/1/2021 at 11:42 AM

## 2021-04-01 NOTE — ED NOTES
Patient lying in bed with blankets watching tv at this time. Patient respirations are regular and unlabored. Patient appears to be in no current distress. Patient VSS. Call light is within reach. Patient expresses no needs at this time.        Alla Oro RN  04/01/21 0152

## 2021-04-01 NOTE — ED NOTES
Pt transported to [facility] by cart in stable condition. Pt monitored on telemetry. Called [facility] and informed that the patient was on their way to the unit.       Tito Daily, WILBERTO  46/74/52 5411

## 2021-04-01 NOTE — ED NOTES
Patient up in bed watching [employer] documentaries at this time. Patient respirations are regular and unlabored. Patient appears to be in no current distress. Patient VSS. Call light is within reach. Patient expresses no needs at this time.        Edy Lazcano RN  04/01/21 3207

## 2021-04-01 NOTE — ED NOTES
Patient returned safely to bed after using the restroom. Patient family at the bedside at this time. Patient respirations are regular and unlabored. Patient appears to be in no current distress. Patient VSS. Call light is within reach. Patient expresses no needs at this time.        Enio Peacock RN  04/01/21 8469

## 2021-04-02 ENCOUNTER — APPOINTMENT (OUTPATIENT)
Dept: CT IMAGING | Age: 51
DRG: 199 | End: 2021-04-02
Payer: COMMERCIAL

## 2021-04-02 LAB
AMPHETAMINE+METHAMPHETAMINE URINE SCREEN: NEGATIVE
ANION GAP SERPL CALCULATED.3IONS-SCNC: 9 MEQ/L (ref 8–16)
BACTERIA: ABNORMAL /HPF
BARBITURATE QUANTITATIVE URINE: NEGATIVE
BENZODIAZEPINE QUANTITATIVE URINE: NEGATIVE
BILIRUBIN URINE: NEGATIVE
BLOOD, URINE: NEGATIVE
BUN BLDV-MCNC: 28 MG/DL (ref 7–22)
CALCIUM SERPL-MCNC: 8.6 MG/DL (ref 8.5–10.5)
CANNABINOID QUANTITATIVE URINE: NEGATIVE
CASTS 2: ABNORMAL /LPF
CASTS UA: ABNORMAL /LPF
CHARACTER, URINE: CLEAR
CHLORIDE BLD-SCNC: 104 MEQ/L (ref 98–111)
CO2: 23 MEQ/L (ref 23–33)
COCAINE METABOLITE QUANTITATIVE URINE: POSITIVE
COLOR: YELLOW
CREAT SERPL-MCNC: 2.5 MG/DL (ref 0.4–1.2)
CREATININE URINE: 66.2 MG/DL
CREATININE, URINE: 66.2 MG/DL
CRYSTALS, UA: ABNORMAL
EPITHELIAL CELLS, UA: ABNORMAL /HPF
GFR SERPL CREATININE-BSD FRML MDRD: 25 ML/MIN/1.73M2
GLUCOSE BLD-MCNC: 88 MG/DL (ref 70–108)
GLUCOSE URINE: NEGATIVE MG/DL
KETONES, URINE: NEGATIVE
LEUKOCYTE ESTERASE, URINE: NEGATIVE
MICROALBUMIN UR-MCNC: 11.95 MG/DL
MICROALBUMIN/CREAT UR-RTO: 181 MG/G (ref 0–30)
MISCELLANEOUS 2: ABNORMAL
NITRITE, URINE: NEGATIVE
OPIATES, URINE: NEGATIVE
OSMOLALITY CALCULATION: 276.8 MOSMOL/KG (ref 275–300)
OXYCODONE: NEGATIVE
PH UA: 6.5 (ref 5–9)
PHENCYCLIDINE QUANTITATIVE URINE: NEGATIVE
POTASSIUM SERPL-SCNC: 4.8 MEQ/L (ref 3.5–5.2)
PROTEIN UA: ABNORMAL
RBC URINE: ABNORMAL /HPF
RENAL EPITHELIAL, UA: ABNORMAL
SEDIMENTATION RATE, ERYTHROCYTE: 9 MM/HR (ref 0–20)
SODIUM BLD-SCNC: 136 MEQ/L (ref 135–145)
SODIUM URINE: 125 MEQ/L
SPECIFIC GRAVITY, URINE: 1.01 (ref 1–1.03)
TROPONIN T: < 0.01 NG/ML
UROBILINOGEN, URINE: 0.2 EU/DL (ref 0–1)
WBC UA: ABNORMAL /HPF
YEAST: ABNORMAL

## 2021-04-02 PROCEDURE — 6360000002 HC RX W HCPCS: Performed by: STUDENT IN AN ORGANIZED HEALTH CARE EDUCATION/TRAINING PROGRAM

## 2021-04-02 PROCEDURE — 6370000000 HC RX 637 (ALT 250 FOR IP): Performed by: INTERNAL MEDICINE

## 2021-04-02 PROCEDURE — 99232 SBSQ HOSP IP/OBS MODERATE 35: CPT | Performed by: INTERNAL MEDICINE

## 2021-04-02 PROCEDURE — 84484 ASSAY OF TROPONIN QUANT: CPT

## 2021-04-02 PROCEDURE — 80048 BASIC METABOLIC PNL TOTAL CA: CPT

## 2021-04-02 PROCEDURE — 85651 RBC SED RATE NONAUTOMATED: CPT

## 2021-04-02 PROCEDURE — 2580000003 HC RX 258: Performed by: STUDENT IN AN ORGANIZED HEALTH CARE EDUCATION/TRAINING PROGRAM

## 2021-04-02 PROCEDURE — 80307 DRUG TEST PRSMV CHEM ANLYZR: CPT

## 2021-04-02 PROCEDURE — 6370000000 HC RX 637 (ALT 250 FOR IP): Performed by: STUDENT IN AN ORGANIZED HEALTH CARE EDUCATION/TRAINING PROGRAM

## 2021-04-02 PROCEDURE — 84300 ASSAY OF URINE SODIUM: CPT

## 2021-04-02 PROCEDURE — 93010 ELECTROCARDIOGRAM REPORT: CPT | Performed by: INTERNAL MEDICINE

## 2021-04-02 PROCEDURE — 1200000003 HC TELEMETRY R&B

## 2021-04-02 PROCEDURE — 81001 URINALYSIS AUTO W/SCOPE: CPT

## 2021-04-02 PROCEDURE — 36415 COLL VENOUS BLD VENIPUNCTURE: CPT

## 2021-04-02 PROCEDURE — 82570 ASSAY OF URINE CREATININE: CPT

## 2021-04-02 PROCEDURE — 74176 CT ABD & PELVIS W/O CONTRAST: CPT

## 2021-04-02 PROCEDURE — 6360000002 HC RX W HCPCS: Performed by: FAMILY MEDICINE

## 2021-04-02 RX ORDER — HYDRALAZINE HYDROCHLORIDE 10 MG/1
10 TABLET, FILM COATED ORAL EVERY 8 HOURS SCHEDULED
Status: DISCONTINUED | OUTPATIENT
Start: 2021-04-02 | End: 2021-04-03 | Stop reason: HOSPADM

## 2021-04-02 RX ORDER — DIPHENHYDRAMINE HYDROCHLORIDE 50 MG/ML
25 INJECTION INTRAMUSCULAR; INTRAVENOUS ONCE
Status: COMPLETED | OUTPATIENT
Start: 2021-04-02 | End: 2021-04-02

## 2021-04-02 RX ORDER — SUMATRIPTAN 6 MG/.5ML
6 INJECTION, SOLUTION SUBCUTANEOUS ONCE
Status: COMPLETED | OUTPATIENT
Start: 2021-04-02 | End: 2021-04-02

## 2021-04-02 RX ORDER — LISINOPRIL 5 MG/1
5 TABLET ORAL DAILY
Status: DISCONTINUED | OUTPATIENT
Start: 2021-04-02 | End: 2021-04-03 | Stop reason: HOSPADM

## 2021-04-02 RX ORDER — LIDOCAINE 4 G/G
1 PATCH TOPICAL DAILY
Status: DISCONTINUED | OUTPATIENT
Start: 2021-04-02 | End: 2021-04-03 | Stop reason: HOSPADM

## 2021-04-02 RX ORDER — BUTALBITAL, ACETAMINOPHEN AND CAFFEINE 50; 325; 40 MG/1; MG/1; MG/1
1 TABLET ORAL EVERY 4 HOURS PRN
Status: DISCONTINUED | OUTPATIENT
Start: 2021-04-02 | End: 2021-04-02

## 2021-04-02 RX ADMIN — HYDRALAZINE HYDROCHLORIDE 10 MG: 10 TABLET, FILM COATED ORAL at 22:18

## 2021-04-02 RX ADMIN — HYDRALAZINE HYDROCHLORIDE 5 MG: 20 INJECTION INTRAMUSCULAR; INTRAVENOUS at 03:09

## 2021-04-02 RX ADMIN — ACETAMINOPHEN 650 MG: 325 TABLET ORAL at 10:25

## 2021-04-02 RX ADMIN — ACETAMINOPHEN 650 MG: 325 TABLET ORAL at 03:08

## 2021-04-02 RX ADMIN — AMLODIPINE BESYLATE 10 MG: 10 TABLET ORAL at 07:59

## 2021-04-02 RX ADMIN — DIPHENHYDRAMINE HYDROCHLORIDE 25 MG: 50 INJECTION, SOLUTION INTRAMUSCULAR; INTRAVENOUS at 14:31

## 2021-04-02 RX ADMIN — ENOXAPARIN SODIUM 40 MG: 40 INJECTION SUBCUTANEOUS at 08:08

## 2021-04-02 RX ADMIN — BUTALBITAL, ACETAMINOPHEN, AND CAFFEINE 1 TABLET: 50; 325; 40 TABLET ORAL at 13:11

## 2021-04-02 RX ADMIN — SUMATRIPTAN 6 MG: 6 INJECTION, SOLUTION SUBCUTANEOUS at 13:12

## 2021-04-02 RX ADMIN — SODIUM CHLORIDE: 9 INJECTION, SOLUTION INTRAVENOUS at 21:22

## 2021-04-02 RX ADMIN — LISINOPRIL 5 MG: 5 TABLET ORAL at 13:11

## 2021-04-02 RX ADMIN — ATORVASTATIN CALCIUM 20 MG: 20 TABLET, FILM COATED ORAL at 07:59

## 2021-04-02 RX ADMIN — HYDRALAZINE HYDROCHLORIDE 10 MG: 10 TABLET, FILM COATED ORAL at 14:30

## 2021-04-02 RX ADMIN — SODIUM CHLORIDE: 9 INJECTION, SOLUTION INTRAVENOUS at 08:08

## 2021-04-02 RX ADMIN — ASPIRIN 81 MG: 81 TABLET, COATED ORAL at 08:08

## 2021-04-02 ASSESSMENT — PAIN DESCRIPTION - FREQUENCY: FREQUENCY: CONTINUOUS

## 2021-04-02 ASSESSMENT — PAIN DESCRIPTION - PROGRESSION: CLINICAL_PROGRESSION: GRADUALLY WORSENING

## 2021-04-02 ASSESSMENT — PAIN - FUNCTIONAL ASSESSMENT: PAIN_FUNCTIONAL_ASSESSMENT: ACTIVITIES ARE NOT PREVENTED

## 2021-04-02 ASSESSMENT — PAIN SCALES - GENERAL
PAINLEVEL_OUTOF10: 5
PAINLEVEL_OUTOF10: 0

## 2021-04-02 ASSESSMENT — PAIN DESCRIPTION - DESCRIPTORS: DESCRIPTORS: ACHING

## 2021-04-02 ASSESSMENT — PAIN DESCRIPTION - DIRECTION: RADIATING_TOWARDS: LEFT SIDE OF FACE

## 2021-04-02 NOTE — PROGRESS NOTES
[facility]   Nephrology progress note  4/2/2021, 12:06 PM      Pt Name:    Jacqueline White  MRN:     [id number]     [place]:    1970  Admit Date:    3/31/2021  5:31 PM  Primary Care Physician:  KORIN Tejeda CNP   Room number  6L-71/722-Q    Chief Complaint: Nephrology following for TERESA/CKD and HTN    Subjective:  Patient seen and examined  No chest pain or shortness of breath  Feels okay    Objective:  24HR INTAKE/OUTPUT:      Intake/Output Summary (Last 24 hours) at 4/2/2021 1206  Last data filed at 4/2/2021 0300  Gross per 24 hour   Intake 1546.91 ml   Output 300 ml   Net 1246.91 ml     I/O last 3 completed shifts: In: 1546.9 [P.O.:950; I.V.:596.9]  Out: 300 [Urine:300]  No intake/output data recorded. Admission weight: 185 lb (83.9 kg)  Wt Readings from Last 3 Encounters:   04/02/21 188 lb 11.2 oz (85.6 kg)   02/07/21 193 lb (87.5 kg)   10/14/20 192 lb (87.1 kg)     Body mass index is 29.55 kg/m².     Physical examination  VITALS:     Vitals:    04/02/21 0300 04/02/21 0425 04/02/21 0540 04/02/21 0755   BP: (!) 173/101 (!) 145/93 (!) 153/87 (!) 172/99   Pulse: 69 65 71 63   Resp: 18   18   Temp: 97.9 °F (36.6 °C)   98.1 °F (36.7 °C)   TempSrc: Oral   Oral   SpO2: 100%   100%   Weight: 188 lb 11.2 oz (85.6 kg)      Height:         General Appearance: alert and cooperative with exam, appears comfortable, no distress  Mouth/Throat: Oral mucosa moist  Neck: No JVD  Lungs: Air entry B/L, no rales, no use of accessory muscles  Heart:  S1, S2 heard  GI: soft, non-tender, no guarding  Extremities: no LE edema      Lab Data  CBC:   Recent Labs     04/01/21  0903   WBC 4.3*   HGB 12.7   HCT 41.4        BMP:  Recent Labs     04/01/21  0903 04/02/21  0419    136   K 4.4 4.8    104   CO2 24 23   BUN 26* 28*   CREATININE 2.4* 2.5*   GLUCOSE 81 88   CALCIUM 9.4 8.6     Hepatic:   Recent Labs     04/01/21  0903   LABALBU 4.0   AST 15   ALT 8*   BILITOT 0.2*   ALKPHOS 50 Meds:  Infusion:    sodium chloride      sodium chloride 75 mL/hr at 04/02/21 0808     Meds:    lidocaine  1 patch Transdermal Daily    SUMAtriptan  6 mg Subcutaneous Once    lisinopril  5 mg Oral Daily    aspirin  81 mg Oral Daily    atorvastatin  20 mg Oral Daily    sodium chloride flush  10 mL Intravenous 2 times per day    enoxaparin  40 mg Subcutaneous Daily    amLODIPine  10 mg Oral Daily     Meds prn: butalbital-acetaminophen-caffeine, sodium chloride flush, sodium chloride, promethazine **OR** ondansetron, polyethylene glycol, acetaminophen **OR** acetaminophen, hydrALAZINE       Impression and Plan:  1. TERESA on CKD 3B versus progressive CKD 3. Appears to be latter  Creatinine overall stable, could be new baseline  Needs chronically better blood pressure control to minimize progression of CKD  Compliance (noncompliance) discussed with patient. UA, urine protein creatinine ratio still pending  Urine for drug screen is still pending  Reduce IV fluids-we will monitor for any improvement over next 24 hours    2. History of substance use. Check urine for drug screen  3. Previous history of NSAID use  4. Hypertension. Continue with Norvasc, okay to add lisinopril. Will add hydralazine 10 mg 3 times daily for more consistent control  5. ?renal mass: Abnormal kidney ultrasound, reviewed.   Will obtain CT abdomen/pelvis without IV contrast due to CKD    D/W patient and team    Chino Li MD  [facility]

## 2021-04-02 NOTE — PROGRESS NOTES
Hospitalist Progress Note      Patient:  Vy Tirado    Unit/Bed:4K-09/009-A  YOB: 1970  MRN: [id number]   Acct: [de-identified]     PCP: KORIN Crawford CNP  Date of Admission: 3/31/2021     Date of Service: Pt seen/examined on 04/02/21  and Admitted to Inpatient with expected LOS greater than two midnights due to medical therapy. Chief Complaint: Left-sided headache, facial pain, body aches    Assessment and Plan:    1.)  Hypertensive urgency: On arrival to the ED, SBP around 190, DBP around 100. Patient endorsed a headache, however this also may be due to viral illness at this time. No red flag symptoms. Due to this, patient is in hypertensive urgency versus emergency. She was given Cardene drip with massive improvement in blood pressure. She was switched to her home dose of amlodipine. She is also given a dose of labetalol in the ED, and has been given a dose of hydralazine as needed overnight. Likely caused from noncompliance with blood pressure medications. Also endorses doing crack. - On home dose of amlodipine, increase if necessary   - Proper pain management should also help with hypertension   - As stated by nephro, consider adding an ACEi if further coverage is needed   - On as PRN hydralazine   - No new EKG changes seen, echo shows concentric LVH with mild diastolic dysfunction   - CT negative, troponin negative    2.)  TERESA on CKD stage III/? Progression of CKD stage III: Creatinine 2.4 on arrival, did receive a dose of Toradol in the emergency department for her headache. Creatinine 2.5 on subsequent check. Baseline creatinine 1.6-1.7. Patient denies urinary symptoms at this time, this may represent a progression of CKD due to noncompliance with hypertensive medications.    - Avoid NSAIDs along with other nephrotoxic agents   - Continue IVF gentle fluid rehydration   - Recheck BMP daily   - Nephrology consulted, ordered US ultrasound which was negative for any obstruction    - A possible right kidney renal mass was seen, not noted on prior CT imaging   - Urine sodium and urine creatinine pending    3.)  Headache and nasal congestion: Patient states symptoms lasting around 3 to 5 days, with yellow/green discharge. CT of head negative for any mastoiditis. Received a dose of Toradol in the emergency department. Nasal congestion likely viral in origin, no need for antibiotics at this point in time. Headache is likely related to both nasal congestion as well as hypertensive urgency. - Continuation of headache despite adequate blood pressure control   - Stated feels like prior episode of mastoiditis   - Has received an MRI several years ago for same episode   - Slightly leukopenic yesterday, ESR normal, unlikely due to bacterial infection   - Control headache pain with warm compresses and lidocaine patches to the neck   - Given 1 dose of Fioricet, with subsequent hot flashes, numbness/ tingling in her hands   - Subsequently given 1 dose of IV Benadryl   - Monitoring for symptomatic control    4.)  Tobacco use: Recommend cessation, current smoker. Nicotine patch offered to help curb cravings while in hospital.    5.)  Thoracic ascending aortic aneurysm: Stable 4.2 x 4.2 cm ascending thoracic aortic aneurysm last seen on CT scan on 2019. Patient reports no chest pain or tearing sensations. Blood pressure has remained stable, hemodynamically stable. 6.)  Stable EKG changes: LAD seen on EKG, stable from prior EKGs. No chest pain noted per patient. Patient has subsequently received an echo and serial troponin check for changes in stability of cardiac disease. Trended troponin negative, echo showed evidence of concentric LVH, with an EF of 60%. Mild diastolic dysfunction noted. No further work-up or testing needed.     7.)  Pulmonary lung nodule: Noted lung nodule 4 x 3 mm in the superior segment of the right lower lobe with recommended CT follow-up in 6 months which was not completed. Recommend follow-up CT.    8.)  HLD: Stated history of, continue Lipitor 20 mg daily. 9.)  ? ??  T2DM: Patient's blood glucose has been stable since admission, A1c was ordered for unknown reason. Unlikely representative of diabetes at this time. Disposition Plan: Patient is likely stable enough for transfer off of 4K to a MedSurg or other stepdown unit. Her blood pressure is under good control with her SBP around 150. She is still receiving work-up for TERESA or possible progression of her kidney disease. History Of Present Illness:      Ms. Manjit Barba is a 60-year-old female with a past medical history of HTN, thyroid removal in 2013, stable ascending thoracic aortic aneurysm, CKD stage III, anxiety, chronic mastoiditis, and prior dental abscesses. The patient was originally seen in urgent care on 3/31 for headaches and facial pain, and was directed to go to the emergency department at that time. She was unable to go to the ED due to having to take care of a family member at the time. Over the next few days she notes increased leg pain, headaches, and congestion. She also endorses green/yellow drainage from her nose for couple of days. She states that the headaches have moved from being left-sided snapping bilateral.  She states she has been out of her amlodipine for 1 week and does not check her blood pressure at home regularly. She states that she has been having body aches as well, and states her abdomen has been feeling uncomfortable for around 5 days. She had diarrhea for 1 or 2 days. She denies urinary symptoms of frequency, pain, hematuria, or urgency. While in the emergency department, her SBP was noted to be around 190 and her DBP was noted to be around 100. She was started on IV Cardene at the time, with rapid control of blood pressure. Her amlodipine home dose was restarted overnight, and hydralazine 5 mg as needed was added in for extra control.   She was also given a dose of Lopressor first admission. A BMP showed a creatinine at 2.4 on admission with a baseline of 1.5-1.7. She was afebrile on presentation, and normal sinus rhythm. 4/2: Patient reiterated the above HPI, and stressed that this headache is very painful. She states the symptoms are exactly the same as when she had mastoiditis last.  She states the pain is mainly located in the the left side of her head and stretches into her face. Along with headache, she states she does have a foreign body aches at this time, especially in her neck and in her back by both of her kidneys. She states that the headache has not decreased despite adequate treatment of her blood pressure currently. She is also currently being seen by nephrology for TERESA, and is aware that we try not to use NSAIDs. Past Medical History, Past Surgical History, Allergies, Medications, Social History, Family History reviewed in H&P and remain unchanged from admission. Diet:  DIET CARDIAC; Review of Systems:   Pertinent positives as noted in the HPI. All other systems reviewed and negative.     Review of Systems - General ROS: positive for  - general body aches  negative for - chills, fatigue or fever  Ophthalmic ROS: positive for - states mild photophobia with headache  negative for - blurry vision, double vision, eye pain or loss of vision  ENT ROS: positive for - congestion, runny nose, states mild phonophobia with headache  negative for - epistaxis, oral lesions, sneezing or tinnitus  Respiratory ROS: no cough, shortness of breath, or wheezing  Cardiovascular ROS: no chest pain or dyspnea on exertion  Gastrointestinal ROS: no abdominal pain, change in bowel habits, or black or bloody stools  Genito-Urinary ROS: no dysuria, trouble voiding, or hematuria  Musculoskeletal ROS: positive for - muscle pain especially neck and lower back  negative for - gait disturbance, joint pain or joint stiffness  Neurological ROS: positive for - NITRU NEGATIVE 05/09/2019    WBCUA NONE SEEN 05/09/2019    BACTERIA NONE 05/09/2019    RBCUA NONE SEEN 05/09/2019    BLOODU Moderate 12/28/2019    SPECGRAV >=1.030 12/28/2019    GLUCOSEU NEGATIVE 05/09/2019       Intake & Output:  I/O last 3 completed shifts: In: 1546.9 [P.O.:950; I.V.:596.9]  Out: 300 [Urine:300]  No intake/output data recorded. Radiology:     US renal ultrasound: I have reviewed the [address] with the family with interpretation: No acute findings, questionable renal mass in the right kidney versus a fetal lobulation  CT head without contrast: I have reviewed the CT following that is dictation: Unremarkable noncontrast CT head. EKG:  I have reviewed the EKG with the following interpretation: NSR with LAD, no ST changes. US RENAL COMPLETE   Final Result   1. No acute findings. 2. Questionable renal mass lesion right kidney versus fetal lobation. 3. CT abdomen and pelvis without and with contrast enhancement (CT urogram protocol) recommended for further evaluation. **This report has been created using voice recognition software. It may contain minor errors which are inherent in voice recognition technology. **      Final report electronically signed by Dr. Fuentes Officer on 4/1/2021 4:28 PM      CT HEAD WO CONTRAST   Final Result   1. Unremarkable noncontrast CT head. **This report has been created using voice recognition software. It may contain minor errors which are inherent in voice recognition technology. **      Final report electronically signed by Dr. Rama Pham on 4/1/2021 10:21 AM           DVT prophylaxis: Lovenox    Code Status: Full Code    PT/OT Eval Status: Not Consulted.     Active Hospital Problems    Diagnosis Date Noted    Hypertensive emergency [I16.1] 04/01/2021    Non compliance w medication regimen [Z91.14]     Polysubstance abuse ([facility] [address] [F19.10] 10/13/2018    Acute kidney injury superimposed on chronic kidney disease ([facility] [address] [N17.9, N18.9] 12/14/2012       Thank you KORIN Wu CNP for the opportunity to be involved in this patient's care.     Electronically signed by Avel Swan DO on 4/2/2021 at 7:42 AM

## 2021-04-02 NOTE — CARE COORDINATION
4/2/21, 10:20 AM EDT  DISCHARGE PLANNING EVALUATION:    Neil Gonzalez       Admitted: 3/31/2021/ [facility] day: 1   Location: 1A-04/039- Reason for admit: Hypertensive emergency [I16.1]   PMH:  has a past medical history of Aneurysm ([address] Anxiety, Chronic kidney disease, Dental abscess, Eczema, GERD (gastroesophageal reflux disease), Hypertension, Mastoiditis, and Thyroid disease. Procedure:   4/1 Renal US  Possible Renal Mass versus Fetal Lobation  Barriers to Discharge:  HTN/TERESA (ran out of medications) w history aneurysm, substance abuse, smoker, alcohol use. Creatinine 2.5; monitor. IVF continued  PCP: KORIN Kelley CNP  Readmission Risk Score: 17%    Patient Goals/Plan/Treatment Preferences: met with client; denied needs as plans home w SO/BF Art independently as PTA when medically cleared; await Addiction Services recommendations  Transportation/Food Security/Housekeeping Addressed:  No issues identified. 4/2/21, 10:43 AM EDT    Patient goals/plan/ treatment preferences discussed by  and . Patient goals/plan/ treatment preferences reviewed with patient/ family. Patient/ family verbalize understanding of discharge plan and are in agreement with goal/plan/treatment preferences. Understanding was demonstrated using the teach back method. AVS provided by RN at time of discharge, which includes all necessary medical information pertaining to the patients current course of illness, treatment, post-discharge goals of care, and treatment preferences.

## 2021-04-02 NOTE — PLAN OF CARE
Problem: Pain:  Goal: Pain level will decrease  Description: Pain level will decrease  Outcome: Ongoing  Note: Pain Assessment: 0-10  Pain Level: 2   Patient's Stated Pain Goal: 2   Is pain goal met at this time? Yes     Non-Pharmaceutical Pain Intervention(s): Rest  Pt appears to be resting comfortably after tylenol and 1x dose of ultram.      Problem: Pain:  Goal: Control of acute pain  Description: Control of acute pain  Outcome: Ongoing     Problem: Pain:  Goal: Control of chronic pain  Description: Control of chronic pain  Outcome: Ongoing     Problem: Infection:  Goal: Will remain free from infection  Description: Will remain free from infection  Outcome: Ongoing  Note: Proper handwashing practices in place. Alcohol caps utilized on IV tubing. Labs monitored. Pt afebrile overnight. Problem: Discharge Planning:  Goal: Patients continuum of care needs are met  Description: Patients continuum of care needs are met  Outcome: Ongoing  Note: Pt plans to be discharged home with boyfriend when stable. Problem: Cardiovascular  Goal: Hemodynamic stability  Outcome: Ongoing  Note: Pt BP elevated this evening. Pt restarted on home med, amlodipine. PRN hydralazine available. Other vital signs stable. Labs monitored. Problem:   Goal: Adequate urinary output  Outcome: Ongoing  Note: Pt receiving IV fluids for elevated creatinine. Pt voiding adequately overnight. Problem: Skin Integrity/Risk  Goal: No skin breakdown during hospitalization  Outcome: Ongoing  Note: No skin breakdown noted this shift. Pt able to reposition self and walk independently. Pillow support provided. Problem: Falls - Risk of:  Goal: Will remain free from falls  Description: Will remain free from falls  Outcome: Ongoing  Note: Pt free of falls this shift. Fall precautions in place. Bed alarm on. Call light and patient belongings within reach. Care plan reviewed with patient.   Patient verbalize understanding of the plan of care and contribute to goal setting.

## 2021-04-02 NOTE — PLAN OF CARE
Problem: Pain:  Goal: Pain level will decrease  Description: Pain level will decrease  4/2/2021 1729 by Marylen Feathers, RN  Outcome: Ongoing  Note: Complains of headache. Imitrix SQ given for headache. 4/2/2021 1729 by Marylen Feathers, RN  Outcome: Ongoing  Goal: Control of acute pain  Description: Control of acute pain  4/2/2021 1729 by Marylen Feathers, RN  Outcome: Ongoing  4/2/2021 1729 by Marylen Feathers, RN  Outcome: Ongoing  Goal: Control of chronic pain  Description: Control of chronic pain  4/2/2021 1729 by Marylen Feathers, RN  Outcome: Ongoing  4/2/2021 1729 by Marylen Feathers, RN  Outcome: Ongoing     Problem: Infection:  Goal: Will remain free from infection  Description: Will remain free from infection  4/2/2021 1729 by Marylen Feathers, RN  Outcome: Ongoing  Note: No signs of infection   4/2/2021 1729 by Marylen Feathers, RN  Outcome: Ongoing     Problem: Discharge Planning:  Goal: Patients continuum of care needs are met  Description: Patients continuum of care needs are met  4/2/2021 1729 by Marylen Feathers, RN  Outcome: Ongoing  Note: Plans to return to home at discharge. 4/2/2021 1729 by Marylen Feathers, RN  Outcome: Ongoing     Problem: Cardiovascular  Goal: Hemodynamic stability  4/2/2021 1729 by Marylen Feathers, RN  Outcome: Ongoing  Note: Blood pressures have been running high. Lisinopril & hydrazaline started. 4/2/2021 1729 by Marylen Feathers, RN  Outcome: Ongoing     Problem:   Goal: Adequate urinary output  4/2/2021 1729 by Marylen Feathers, RN  Outcome: Ongoing  Note: Urinating without difficulty. 4/2/2021 1729 by Marylen Feathers, RN  Outcome: Ongoing     Problem: Skin Integrity/Risk  Goal: No skin breakdown during hospitalization  4/2/2021 1729 by Marylen Feathers, RN  Outcome: Ongoing  Note: No new skin breakdown this shift.    4/2/2021 1729 by Marylen Feathers, RN  Outcome: Ongoing     Problem: Falls - Risk of:  Goal: Will remain free from falls  Description: Will remain free from falls  4/2/2021 1729 by Stacy Lemus RN  Outcome: Ongoing  Note: No falls this shift. 4/2/2021 1729 by Stacy Lemus RN  Outcome: Ongoing     Care plan reviewed with patient. Patient verbalize understanding of the plan of care and contribute to goal setting.

## 2021-04-03 VITALS
OXYGEN SATURATION: 100 % | BODY MASS INDEX: 29.82 KG/M2 | WEIGHT: 190 LBS | DIASTOLIC BLOOD PRESSURE: 97 MMHG | HEIGHT: 67 IN | SYSTOLIC BLOOD PRESSURE: 144 MMHG | HEART RATE: 70 BPM | TEMPERATURE: 98.2 F | RESPIRATION RATE: 18 BRPM

## 2021-04-03 LAB
ANION GAP SERPL CALCULATED.3IONS-SCNC: 11 MEQ/L (ref 8–16)
BUN BLDV-MCNC: 32 MG/DL (ref 7–22)
CALCIUM SERPL-MCNC: 8.5 MG/DL (ref 8.5–10.5)
CHLORIDE BLD-SCNC: 108 MEQ/L (ref 98–111)
CO2: 22 MEQ/L (ref 23–33)
CREAT SERPL-MCNC: 2.5 MG/DL (ref 0.4–1.2)
GFR SERPL CREATININE-BSD FRML MDRD: 25 ML/MIN/1.73M2
GLUCOSE BLD-MCNC: 97 MG/DL (ref 70–108)
MRSA SCREEN: NORMAL
POTASSIUM SERPL-SCNC: 4.9 MEQ/L (ref 3.5–5.2)
SODIUM BLD-SCNC: 141 MEQ/L (ref 135–145)

## 2021-04-03 PROCEDURE — 36415 COLL VENOUS BLD VENIPUNCTURE: CPT

## 2021-04-03 PROCEDURE — 99239 HOSP IP/OBS DSCHRG MGMT >30: CPT | Performed by: INTERNAL MEDICINE

## 2021-04-03 PROCEDURE — 99232 SBSQ HOSP IP/OBS MODERATE 35: CPT | Performed by: INTERNAL MEDICINE

## 2021-04-03 PROCEDURE — 6370000000 HC RX 637 (ALT 250 FOR IP): Performed by: STUDENT IN AN ORGANIZED HEALTH CARE EDUCATION/TRAINING PROGRAM

## 2021-04-03 PROCEDURE — 6360000002 HC RX W HCPCS: Performed by: STUDENT IN AN ORGANIZED HEALTH CARE EDUCATION/TRAINING PROGRAM

## 2021-04-03 PROCEDURE — 94760 N-INVAS EAR/PLS OXIMETRY 1: CPT

## 2021-04-03 PROCEDURE — 2580000003 HC RX 258: Performed by: STUDENT IN AN ORGANIZED HEALTH CARE EDUCATION/TRAINING PROGRAM

## 2021-04-03 PROCEDURE — 6370000000 HC RX 637 (ALT 250 FOR IP): Performed by: INTERNAL MEDICINE

## 2021-04-03 PROCEDURE — 80048 BASIC METABOLIC PNL TOTAL CA: CPT

## 2021-04-03 RX ORDER — DIPHENHYDRAMINE HCL 25 MG
25 TABLET ORAL EVERY 6 HOURS PRN
Status: DISCONTINUED | OUTPATIENT
Start: 2021-04-03 | End: 2021-04-03 | Stop reason: HOSPADM

## 2021-04-03 RX ORDER — ATORVASTATIN CALCIUM 20 MG/1
20 TABLET, FILM COATED ORAL DAILY
Qty: 30 TABLET | Refills: 0 | Status: SHIPPED | OUTPATIENT
Start: 2021-04-03 | End: 2022-10-07

## 2021-04-03 RX ORDER — LISINOPRIL 5 MG/1
5 TABLET ORAL DAILY
Qty: 30 TABLET | Refills: 0 | Status: SHIPPED | OUTPATIENT
Start: 2021-04-04 | End: 2022-08-29

## 2021-04-03 RX ORDER — ASPIRIN 81 MG/1
TABLET ORAL
Qty: 30 TABLET | Refills: 0 | Status: SHIPPED | OUTPATIENT
Start: 2021-04-03

## 2021-04-03 RX ORDER — AMLODIPINE BESYLATE 10 MG/1
10 TABLET ORAL DAILY
Qty: 30 TABLET | Refills: 0 | Status: SHIPPED | OUTPATIENT
Start: 2021-04-03 | End: 2022-08-29

## 2021-04-03 RX ADMIN — ENOXAPARIN SODIUM 40 MG: 40 INJECTION SUBCUTANEOUS at 09:14

## 2021-04-03 RX ADMIN — SODIUM CHLORIDE, PRESERVATIVE FREE 10 ML: 5 INJECTION INTRAVENOUS at 09:09

## 2021-04-03 RX ADMIN — ACETAMINOPHEN 650 MG: 325 TABLET ORAL at 00:02

## 2021-04-03 RX ADMIN — ATORVASTATIN CALCIUM 20 MG: 20 TABLET, FILM COATED ORAL at 09:09

## 2021-04-03 RX ADMIN — AMLODIPINE BESYLATE 10 MG: 10 TABLET ORAL at 09:09

## 2021-04-03 RX ADMIN — ASPIRIN 81 MG: 81 TABLET, COATED ORAL at 09:08

## 2021-04-03 RX ADMIN — ACETAMINOPHEN 650 MG: 325 TABLET ORAL at 09:14

## 2021-04-03 RX ADMIN — LISINOPRIL 5 MG: 5 TABLET ORAL at 09:09

## 2021-04-03 RX ADMIN — HYDRALAZINE HYDROCHLORIDE 10 MG: 10 TABLET, FILM COATED ORAL at 05:05

## 2021-04-03 RX ADMIN — HYDRALAZINE HYDROCHLORIDE 10 MG: 10 TABLET, FILM COATED ORAL at 15:13

## 2021-04-03 ASSESSMENT — PAIN DESCRIPTION - PAIN TYPE: TYPE: ACUTE PAIN

## 2021-04-03 ASSESSMENT — PAIN SCALES - GENERAL
PAINLEVEL_OUTOF10: 0
PAINLEVEL_OUTOF10: 3
PAINLEVEL_OUTOF10: 0

## 2021-04-03 ASSESSMENT — PAIN DESCRIPTION - FREQUENCY: FREQUENCY: CONTINUOUS

## 2021-04-03 ASSESSMENT — PAIN DESCRIPTION - PROGRESSION: CLINICAL_PROGRESSION: GRADUALLY WORSENING

## 2021-04-03 ASSESSMENT — PAIN DESCRIPTION - ONSET: ONSET: GRADUAL

## 2021-04-03 NOTE — CONSULTS
Brief Intervention and Referral to Treatment Summary    Patient was provided PHQ-9, AUDIT and DAST Screening:      PHQ-9 Score: n/a  AUDIT Score: n/a   DAST Score:  3    Patients substance use is considered     Substantial Risk    Patients depression is considered:     n/a    Brief Education Was Provided      Patient was not receptive    Brief Intervention Is Provided (Only for AUDIT or DAST)     Patient denies readiness to decrease and/or stop use and a plan was not discussed    Recommendations/Referrals for Brief and/or Specialized Treatment Provided to Patient     Patient denied resources.

## 2021-04-03 NOTE — PROGRESS NOTES
Discharge teaching completed with pt. Pt verbalized understanding of teaching. All questions answered. Iv removed. Tip intact. Site negative. Pt denies any further questions at this time.

## 2021-04-03 NOTE — PROGRESS NOTES
Attempted AOD consult. Pt sleeping. Pt visitor advises patient has been sleeping 'for awhile'. WU to re-attempt.

## 2021-04-03 NOTE — PROGRESS NOTES
[facility]   Nephrology progress note  4/3/2021, 1:43 PM      Pt Name:    Sam Aguirre  MRN:     [id number]     [place]:    1970  Admit Date:    3/31/2021  5:31 PM  Primary Care Physician:  KORIN Dickey CNP   Room number  6K-12/012-A    Chief Complaint: Nephrology following for TERESA/CKD and HTN    Subjective:  Patient seen and examined  No chest pain or shortness of breath  Appears comfortable    Objective:  24HR INTAKE/OUTPUT:      Intake/Output Summary (Last 24 hours) at 4/3/2021 1343  Last data filed at 4/3/2021 1158  Gross per 24 hour   Intake 1293.11 ml   Output --   Net 1293.11 ml     I/O last 3 completed shifts: In: 1720 [P.O.:640; I.V.:1125]  Out: 900 [Urine:900]  I/O this shift: In: 715.8 [P.O.:240; I.V.:475.8]  Out: -   Admission weight: 185 lb (83.9 kg)  Wt Readings from Last 3 Encounters:   04/03/21 190 lb (86.2 kg)   02/07/21 193 lb (87.5 kg)   10/14/20 192 lb (87.1 kg)     Body mass index is 29.76 kg/m².     Physical examination  VITALS:     Vitals:    04/03/21 0502 04/03/21 0840 04/03/21 1158 04/03/21 1239   BP: (!) 154/85 (!) 154/98 (!) 144/97    Pulse: 65 70 70    Resp: 18 18 18    Temp: 98.4 °F (36.9 °C) 98.7 °F (37.1 °C) 98.2 °F (36.8 °C)    TempSrc: Oral Oral Oral    SpO2: 100% 99% 100% 100%   Weight: 190 lb (86.2 kg)      Height:         General Appearance: alert and cooperative with exam, appears comfortable, no distress  Mouth/Throat: Oral mucosa moist  Neck: No JVD  Lungs: Air entry B/L, no rales, no use of accessory muscles  Heart:  S1, S2 heard  GI: soft, non-tender, no guarding  Extremities: no LE edema      Lab Data  CBC:   Recent Labs     04/01/21  0903   WBC 4.3*   HGB 12.7   HCT 41.4        BMP:  Recent Labs     04/01/21  0903 04/02/21  0419 04/03/21  0205    136 141   K 4.4 4.8 4.9    104 108   CO2 24 23 22*   BUN 26* 28* 32*   CREATININE 2.4* 2.5* 2.5*   GLUCOSE 81 88 97   CALCIUM 9.4 8.6 8.5     Hepatic:   Recent Labs

## 2021-04-03 NOTE — DISCHARGE INSTR - COC
Continuity of Care Form    Patient Name: Kassandra Ramirez   :  1970  MRN:  [id number]    Admit date:  3/31/2021  Discharge date:  ***    Code Status Order: Full Code   Advance Directives:   Advance Care Flowsheet Documentation     Date/Time Healthcare Directive Type of Healthcare Directive Copy in [address] Agent's Name Healthcare Agent's Phone Number    21 1825  No, patient does not have an advance directive for healthcare treatment -- -- -- -- --          Admitting Physician:  Gucci Yost MD  PCP: KORIN Caal - CNP    Discharging Nurse: [facility] Unit/Room#: 6K-12/012-A  Discharging Unit Phone Number: ***    Emergency Contact:   Extended Emergency Contact Information  Primary Emergency Contact: Clint Lujan  Address: [address] [place] [address] Phone: [phone]  Relation: Child  Hearing or visual needs: None  Other needs: None  Preferred language: English   needed? No  Secondary Emergency Contact: Dario Goss  Mobile Phone: [phone]  Relation: Domestic Partner    Past Surgical History:  Past Surgical History:   Procedure Laterality Date    CARDIAC SURGERY       SECTION  86 87 90    ENDOSCOPY, COLON, DIAGNOSTIC      HEEL SPUR SURGERY Right     [place] EGD TRANSORAL BIOPSY SINGLE/MULTIPLE Left 2018    EGD BIOPSY performed by Parker Mckeon MD at [facility]    SALPINGECTOMY      tubal pregnancy    THYROIDECTOMY, PARTIAL  2011    TUBAL LIGATION  2010    TUNNELED VENOUS PORT PLACEMENT Left     mediport in chest       Immunization History: There is no immunization history on file for this patient.     Active Problems:  Patient Active Problem List   Diagnosis Code    Hypertension I10    Eczema L30.9    Acute kidney injury superimposed on chronic kidney disease ([facility] [address] N17.9, N18.9    Dyspnea R06.00    Obesity E66.9    History of URI (upper respiratory infection) Z87.09    Hyponatremia E87.1    Nicotine dependence F17.200    Angina, class II (HCC) I20.9    Abdominal pain R10.9    Polysubstance abuse ([address] F19.10    Hypertensive emergency I16.1    Non compliance w medication regimen Z91.14       Isolation/Infection:   Isolation          No Isolation        Patient Infection Status     Infection Onset Added Last Indicated Last Indicated By Review Planned Expiration Resolved Resolved By    None active    Resolved    COVID-19 Rule Out 04/01/21 04/01/21 04/01/21 COVID-19, Rapid (Ordered)   04/01/21 Rule-Out Test Resulted          Nurse Assessment:  Last Vital Signs: BP (!) 144/97   Pulse 70   Temp 98.2 °F (36.8 °C) (Oral)   Resp 18   Ht 5' 7\" (1.702 m)   Wt 190 lb (86.2 kg)   LMP 03/23/2021 (Approximate)   SpO2 100%   BMI 29.76 kg/m²     Last documented pain score (0-10 scale): Pain Level: 4  Last Weight:   Wt Readings from Last 1 Encounters:   04/03/21 190 lb (86.2 kg)     Mental Status:

## 2021-04-03 NOTE — DISCHARGE SUMMARY
Discharge Summary     Patient Identification:  Sandy Sarabia  : 1970  MRN: [id number]   Account: [de-identified]     Admit date: 3/31/2021  Discharge date: 4/3/2021   Attending provider: Malika Diaz MD        Primary care provider: Damita Runner, APRN - CNP     Discharge Diagnoses:     1.)  Hypertensive urgency: On arrival to the ED, SBP around 190, DBP around 100. Patient endorsed a headache, however this also may be due to viral illness at this time. No red flag symptoms. Due to this, patient is in hypertensive urgency versus emergency. She was given Cardene drip with massive improvement in blood pressure. She was switched to her home dose of amlodipine. She is also given a dose of labetalol in the ED, and has been given a dose of hydralazine as needed overnight. Likely caused from noncompliance with blood pressure medications. Also endorses doing crack. - Blood pressure stable on 4/3 with SBP around 150   - Continue home amlodipine outpatient, starting lisinopril for home medications   - Recheck BMP in 1 week to reassess kidney function   - May treat headaches with over-the-counter medications    2.)  TERESA on CKD stage III/? Progression of CKD stage III: Creatinine 2.4 on arrival, did receive a dose of Toradol in the emergency department for her headache. Creatinine 2.5 on subsequent check. Baseline creatinine 1.6-1.7. Patient denies urinary symptoms at this time, this may represent a progression of CKD due to noncompliance with hypertensive medications.   - Nephrology consulted, ordered Renal US which was negative for any obstruction    - A possible right kidney renal mass was seen    - Follow-up CT on 4/3 shows no evidence of renal mass   - We will follow nephrology outpatient, rechecking BMP in 1 week   - Nephrology states that this is likely progression of CKD    3.)  Headache and nasal congestion: Patient states symptoms lasting around 3 to 5 days, with yellow/green discharge.   CT of head negative for any mastoiditis. Received a dose of Toradol in the emergency department. Nasal congestion likely viral in origin, no need for antibiotics at this point in time. Headache is likely related to both nasal congestion as well as hypertensive urgency. Was given 1 dose of Fioricet and Imitrex, with stated numbness/tingling in her hands as well as hot flashes. She states that she does not want to take that medication again. 1 dose of IV Benadryl was given with resolution of the symptoms.   - Continuation of headache despite adequate blood pressure control   - States feels like prior episode of mastoiditis    - ESR 9, CT negative for changes, WBC within normal limits    - Unlikely mastoiditis as there is no signs of inflammation   - As outpatient, she may take Tylenol and other over-the-counter headache medications    - Recommend warm compresses    - May take Excedrin up to once a day    - Recommended consult to neurology for further work-up    - If needed, Benadryl can be taken for headache as well    4.)  Tobacco use: Recommend cessation, current smoker. Nicotine patch offered to help curb cravings while in hospital.    5.)  Thoracic ascending aortic aneurysm: Stable 4.2 x 4.2 cm ascending thoracic aortic aneurysm last seen on CT scan on 2019. Patient reports no chest pain or tearing sensations. Blood pressure has remained stable, hemodynamically stable. 6.)  Stable EKG changes: LAD seen on EKG, stable from prior EKGs. No chest pain noted per patient. Patient has subsequently received an echo and serial troponin check for changes in stability of cardiac disease. Trended troponin negative, echo showed evidence of concentric LVH, with an EF of 60%. Mild diastolic dysfunction noted. No further work-up or testing needed.     7.)  Pulmonary lung nodule: Noted lung nodule 4 x 3 mm in the superior segment of the right lower lobe with recommended CT follow-up in 6 months which was not completed. Recommend follow-up CT.   - Follow-up CT scan should be scheduled by primary care physician outpatient    8.)  HLD: Stated history of, continue Lipitor 20 mg daily. 9.)  Hyperglycemia: Patient's blood glucose has been stable since admission, A1c was ordered for unknown reason. Unlikely representative of diabetes at this time. Hospital Course:      Ms. Otto Lamb is a 52-year-old female with a past medical history of HTN, thyroid removal in 2013, stable ascending thoracic aortic aneurysm, CKD stage III, anxiety, chronic mastoiditis, and prior dental abscesses. The patient was originally seen in urgent care on 3/31 for headaches and facial pain, and was directed to go to the emergency department at that time. She was unable to go to the ED due to having to take care of a family member at the time. Over the next few days she notes increased leg pain, headaches, and congestion. She also endorses green/yellow drainage from her nose for couple of days. She states that the headaches have moved from being left-sided snapping bilateral.  She states she has been out of her amlodipine for 1 week and does not check her blood pressure at home regularly. She states that she has been having body aches as well, and states her abdomen has been feeling uncomfortable for around 5 days. She had diarrhea for 1 or 2 days. She denies urinary symptoms of frequency, pain, hematuria, or urgency. While in the emergency department, her SBP was noted to be around 190 and her DBP was noted to be around 100. She was started on IV Cardene at the time, with rapid control of blood pressure. Her amlodipine home dose was restarted overnight, and hydralazine 5 mg as needed was added in for extra control. She was also given a dose of Lopressor first admission. A BMP showed a creatinine at 2.4 on admission with a baseline of 1.5-1.7. She was afebrile on presentation, and normal sinus rhythm.     4/2: Patient tablet by mouth once daily             atorvastatin (LIPITOR) 20 MG tablet  Take 1 tablet by mouth daily             Blood Pressure Monitoring KIT  1 Units by Does not apply route 2 times daily             lisinopril (PRINIVIL;ZESTRIL) 5 MG tablet  Take 1 tablet by mouth daily                 Patient Instructions:    Discharge lab work: BMP in 1 week to recheck kidney function due to starting lisinopril. Activity: activity as tolerated  Diet: DIET CARDIAC;    Code Status: Full Code    Follow-up visits:   [facility] EMERGENCY DEPT  [address]    Go now    Kai Aguilar, APRN - CNP  [address]             Procedures: None    Consults:   STR ED TO IP CONSULT  IP CONSULT TO ADDICTION SERVICES    Examination:  Vitals:  Vitals:    04/03/21 0502 04/03/21 0840 04/03/21 1158 04/03/21 1239   BP: (!) 154/85 (!) 154/98 (!) 144/97    Pulse: 65 70 70    Resp: 18 18 18    Temp: 98.4 °F (36.9 °C) 98.7 °F (37.1 °C) 98.2 °F (36.8 °C)    TempSrc: Oral Oral Oral    SpO2: 100% 99% 100% 100%   Weight: 190 lb (86.2 kg)      Height:         Weight: Weight: 190 lb (86.2 kg)     24 hour intake/output:    Intake/Output Summary (Last 24 hours) at 4/3/2021 1424  Last data filed at 4/3/2021 1158  Gross per 24 hour   Intake 1293.11 ml   Output --   Net 1293.11 ml       General appearance:  No apparent distress, appears stated age and cooperative. HEENT:  Normal cephalic, atraumatic without obvious deformity. Pupils equal, round, and reactive to light. Extra ocular muscles intact. Conjunctivae/corneas clear. Neck: Supple, with full range of motion. No jugular venous distention. Trachea midline. Respiratory:  Normal respiratory effort. Clear to auscultation, bilaterally without Rales/Wheezes/Rhonchi. Cardiovascular:  Regular rate and rhythm with normal S1/S2 without murmurs, rubs or gallops. Abdomen: Soft, non-tender, non-distended with normal bowel sounds.   Musculoskeletal: No clubbing, cyanosis or edema bilaterally. Full range of motion without deformity. Skin: Skin color, texture, turgor normal.  No rashes or lesions. Neurologic:  Neurovascularly intact without any focal sensory/motor deficits. Cranial nerves: II-XII intact, grossly non-focal.  Psychiatric:  Alert and oriented, thought content appropriate, normal insight  Capillary Refill: Brisk,< 3 seconds   Peripheral Pulses: +2 palpable, equal bilaterally     Significant Diagnostics:   Radiology: Echo Complete 2d W Doppler W Color    Result Date: 4/1/2021  Transthoracic Echocardiography Report (TTE)  Demographics   Patient Name   Huber Godfrey  Gender              Female                 R   MR #           [id number]      Race                Black                                 Ethnicity   Account #      [de-identified]      Room Number         09   Accession      [id number]     Date of Study       04/01/2021  Number   Date of Birth  1970     Referring Physician Devaughn Rosado DO   Age            49 year(s)     Sonographer         Jalyn Quinteros RDCS,                                                    RVT                                 Interpreting        Echo reader of the week                                Physician           Elena Kauffman MD  Procedure Type of Study   TTE procedure:ECHOCARDIOGRAM COMPLETE 2D W DOPPLER W COLOR. Procedure Date Date: 04/01/2021 Start: 01:55 PM Study Location: Bedside Technical Quality: Adequate visualization Indications:Hypertensive emergency. Additional Medical History:smoker, obesity, shortness of breath, substance abuse, thyroid disease Patient Status: Routine Height: 67 inches Weight: 185 pounds BSA: 1.96 m^2 BMI: 28.98 kg/m^2 BP: 131/93 mmHg Allergies   - See Epic. Conclusions   Summary  Ejection fraction is visually estimated at 60%.   Overall left ventricular function is normal.  Mild concentric left ventricular hypertrophy. Impaired relaxation compatible with diastolic dysfunction. ( reversed E/A  ratio)   Signature   ----------------------------------------------------------------  Electronically signed by Cam Arguello MD (Interpreting  physician) on 04/01/2021 at 04:48 PM  ----------------------------------------------------------------   Findings   Mitral Valve  The mitral valve structure was normal with normal leaflet separation. DOPPLER: The transmitral velocity was within the normal range with no  evidence for mitral stenosis. There was no evidence of mitral  regurgitation. Aortic Valve  The aortic valve was trileaflet with normal thickness and cuspal  separation. DOPPLER: Transaortic velocity was within the normal range with  no evidence of aortic stenosis. There was no evidence of aortic  regurgitation. Tricuspid Valve  The tricuspid valve structure was normal with normal leaflet separation. DOPPLER: There was no evidence of tricuspid stenosis. There was no  evidence of tricuspid regurgitation. Pulmonic Valve  The pulmonic valve was not well visualized . Trivial pulmonic regurgitation visualized. Left Atrium  Left atrial size was normal.   Left Ventricle  Ejection fraction is visually estimated at 60%. Overall left ventricular function is normal.  Mild concentric left ventricular hypertrophy. Impaired relaxation compatible with diastolic dysfunction. ( reversed E/A  ratio)   Right Atrium  Right atrial size was normal.   Right Ventricle  The right ventricular size was normal with normal systolic function and  wall thickness. Pericardial Effusion  The pericardium was normal in appearance with no evidence of a pericardial  effusion. Pleural Effusion  No evidence of pleural effusion. Aorta / Great Vessels  -Aortic root dimension within normal limits.  -The Pulmonary artery is within normal limits. -IVC size is within normal limits with normal respiratory phasic changes.   M-Mode/2D Measurements & Calculations   LV Diastolic   LV Systolic Dimension:    AV Cusp Separation: 1.8 cmLA  Dimension: 4.3 2.2 cm                    Dimension: 3.7 cmAO Root  cm             LV Volume Diastolic: 58.0 Dimension: 2.6 cmLA Area: 13.7  LV FS:48.8 %   ml                        cm^2  LV PW          LV Volume Systolic: 16.9  Diastolic: 1.4 ml  cm             LV EDV/LV EDV Index: 83.1  Septum         ml/42 m^2LV ESV/LV ESV    RV Diastolic Dimension: 3 cm  Diastolic: 1.2 Index: 90.2 ml/8 m^2  cm             EF Calculated: 80.5 %     LA/Aorta: 1.42                                            LA volume/Index: 33.3 ml /17m^2  Doppler Measurements & Calculations   MV Peak E-Wave: 77.1 cm/s  AV Peak Velocity: 129  LVOT Peak Velocity: 110  MV Peak A-Wave: 74.1 cm/s  cm/s                   cm/s  MV E/A Ratio: 1.04         AV Peak Gradient: 6.66 LVOT Peak Gradient: 5  MV Peak Gradient: 2.38     mmHg                   mmHg  mmHg                                                    TV Peak E-Wave: 62.1  MV Deceleration Time: 225                         cm/s  msec                                              TV Peak A-Wave: 45.8  MV P1/2t: 66 msec                                 cm/s  MVA by PHT:3.33 cm^2                                                    TV Peak Gradient: 1.54  MV E' Septal Velocity: 4.6 AV DVI (Vmax):0.85     mmHg  cm/s                                              TR Velocity:233 cm/s  MV A' Septal Velocity: 8.8                        TR Gradient:21.72 mmHg  cm/s                                              PV Peak Velocity: 84.8  MV E' Lateral Velocity:                           cm/s  6.1 cm/s                                          PV Peak Gradient: 2.88  MV A' Lateral Velocity:                           mmHg  7.8 cm/s  E/E' septal: 16.76  E/E' lateral: 12.64  MR Velocity: 474 cm/s  [id number]    Ct Head Wo Contrast    Result Date: 4/1/2021  PROCEDURE: CT HEAD WO CONTRAST CLINICAL INFORMATION: headache with uncontrolled htn. COMPARISON: No prior study. TECHNIQUE: 5 mm axial imaging through the head without IV contrast. All CT scans at this facility use dose modulation, iterative reconstruction, and/or weight based dosing when appropriate to reduce the radiation dose to as low as reasonably achievable. CONTRAST: None. FINDINGS: POSTOPERATIVE CHANGES: None. BRAIN SULCI: Normal for the patient's age. VENTRICLES/CISTERNS: Normal for the patient's age. MASS/MASS EFFECT/MIDLINE SHIFT: None. CEREBRUM: Unremarkable. . CEREBELLUM: Unremarkable. BRAINSTEM: Unremarkable. INFARCT/WHITE MATTER DISEASE: None. INTRACRANIAL HEMORRHAGE: None. INTRA-AXIAL AND EXTRA-AXIAL FLUID COLLECTIONS: None. ABNORMAL ENHANCEMENT:  N/A INTRAORBITAL CONTENTS:  Unremarkable. PARANASAL SINUSES: Unremarkable. SKULL/SCALP: Unremarkable. OTHER: None. 1. Unremarkable noncontrast CT head. **This report has been created using voice recognition software. It may contain minor errors which are inherent in voice recognition technology. ** Final report electronically signed by Dr. Kailey Garner on 4/1/2021 10:21 AM    Us Renal Complete    Result Date: 4/1/2021  BILATERAL RENAL ULTRASOUND: CLINICAL INFORMATION: TERESA COMPARISON: No comparison available. TECHNIQUE: Multiple sonographic images of both kidneys were obtained. Images of the urinary bladder were also obtained. FINDINGS: RIGHT KIDNEY - 9.5 x 4.7 x 4.9 cm Resistive Index - 0.55 Cortical Thickness - 1.1 cm LEFT KIDNEY - 9.5 x 4.3 x 4.1 cm Resistive Index - 0.52 Cortical Thickness - 1.0 cm URINARY BLADDER Pre-Void - 9.3 mL Post-Void - pt did not have to void  KIDNEYS:  Both kidneys are normal in size and contour.   The resistive indices are normal.  MASS/CYST: There is a 3.2 x 2.8 x 2.7 cm isoechoic area of soft tissue fullness in the inferior aspect right kidney. This may simply represent fetal lobation but a mass lesion cannot definitely be excluded. HYDRONEPHROSIS:  There is no hydronephrosis. CALCULI:  No calculi are seen. BLADDER:  The urinary bladder is unremarkable. .     1. No acute findings. 2. Questionable renal mass lesion right kidney versus fetal lobation. 3. CT abdomen and pelvis without and with contrast enhancement (CT urogram protocol) recommended for further evaluation. **This report has been created using voice recognition software. It may contain minor errors which are inherent in voice recognition technology. ** Final report electronically signed by Dr. Angelina Dumont on 4/1/2021 4:28 PM      Labs:   Recent Results (from the past 72 hour(s))   CBC auto differential    Collection Time: 04/01/21  9:03 AM   Result Value Ref Range    WBC 4.3 (L) 4.8 - 10.8 thou/mm3    RBC 4.40 4.20 - 5.40 mill/mm3    Hemoglobin 12.7 12.0 - 16.0 gm/dl    Hematocrit 41.4 37.0 - 47.0 %    MCV 94.1 81.0 - 99.0 fL    MCH 28.9 26.0 - 33.0 pg    MCHC 30.7 (L) 32.2 - 35.5 gm/dl    RDW-CV 13.6 11.5 - 14.5 %    RDW-SD 47.1 (H) 35.0 - 45.0 fL    Platelets 903 425 - 356 thou/mm3    MPV 11.9 9.4 - 12.4 fL    Seg Neutrophils 52.9 %    Lymphocytes 34.3 %    Monocytes 10.0 %    Eosinophils 2.1 %    Basophils 0.7 %    Immature Granulocytes 0.0 %    Segs Absolute 2.3 1 - 7 thou/mm3    Lymphocytes Absolute 1.5 1.0 - 4.8 thou/mm3    Monocytes Absolute 0.4 0.4 - 1.3 thou/mm3    Eosinophils Absolute 0.1 0.0 - 0.4 thou/mm3    Basophils Absolute 0.0 0.0 - 0.1 thou/mm3    Immature Grans (Abs) 0.00 0.00 - 0.07 thou/mm3    nRBC 0 /100 wbc   Comprehensive Metabolic Panel    Collection Time: 04/01/21  9:03 AM   Result Value Ref Range    Glucose 81 70 - 108 mg/dL    CREATININE 2.4 (H) 0.4 - 1.2 mg/dL    BUN 26 (H) 7 - 22 mg/dL    Sodium 136 135 - 145 meq/L    Potassium 4.4 3.5 - 5.2 meq/L    Chloride 103 98 - 111 meq/L    CO2 24 23 - 33 meq/L    Calcium 9.4 8.5 - 10.5 mg/dL    AST 15 5 - 40 U/L    Alkaline Phosphatase 50 38 - 126 U/L    Total Protein 7.1 6.1 - 8.0 g/dL    Albumin 4.0 3.5 - 5.1 g/dL    Total Bilirubin 0.2 (L) 0.3 - 1.2 mg/dL    ALT 8 (L) 11 - 66 U/L   Troponin    Collection Time: 04/01/21  9:03 AM   Result Value Ref Range    Troponin T < 0.010 ng/ml   Anion Gap    Collection Time: 04/01/21  9:03 AM   Result Value Ref Range    Anion Gap 9.0 8.0 - 16.0 meq/L   Glomerular Filtration Rate, Estimated    Collection Time: 04/01/21  9:03 AM   Result Value Ref Range    Est, Glom Filt Rate 26 (A) ml/min/1.73m2   Osmolality    Collection Time: 04/01/21  9:03 AM   Result Value Ref Range    Osmolality Calc 275.7 275.0 - 300.0 mOsmol/kg   Scan of Blood Smear    Collection Time: 04/01/21  9:03 AM   Result Value Ref Range    SCAN OF BLOOD SMEAR see below    EKG 12 Lead    Collection Time: 04/01/21  9:48 AM   Result Value Ref Range    Ventricular Rate 60 BPM    Atrial Rate 60 BPM    P-R Interval 166 ms    QRS Duration 74 ms    Q-T Interval 386 ms    QTc Calculation (Bazett) 386 ms    P Axis 41 degrees    R Axis -75 degrees    T Axis 85 degrees   COVID-19, Rapid    Collection Time: 04/01/21 10:17 AM   Result Value Ref Range    SARS-CoV-2, NAAT NOT DETECTED NOT DETECTED   MRSA by PCR    Collection Time: 04/01/21  6:15 PM   Result Value Ref Range    MRSA SCREEN RT-PCR NEGATIVE    Culture, MRSA, Screening    Collection Time: 04/01/21  6:15 PM    Specimen: Rectum   Result Value Ref Range    MRSA SCREEN No MRSA isolated     VRE Screen by PCR    Collection Time: 04/01/21  6:15 PM    Specimen: Rectal Swab   Result Value Ref Range    Vancomycin Resistant Enterococcus NEGATIVE    Troponin    Collection Time: 04/01/21  9:17 PM   Result Value Ref Range    Troponin T < 0.010 ng/ml   Troponin    Collection Time: 04/02/21  4:19 AM   Result Value Ref Range    Troponin T < 0.010 ng/ml   Basic Metabolic Panel    Collection Time: 04/02/21  4:19 AM   Result Value Ref Range    Sodium 136 135 - 145 meq/L    Potassium 4.8 3.5 - 5.2 meq/L    Chloride 104 98 - 111 meq/L    CO2 23 23 - 33 meq/L    Glucose 88 70 - 108 mg/dL    BUN 28 (H) 7 - 22 mg/dL    CREATININE 2.5 (H) 0.4 - 1.2 mg/dL    Calcium 8.6 8.5 - 10.5 mg/dL   Anion Gap    Collection Time: 04/02/21  4:19 AM   Result Value Ref Range    Anion Gap 9.0 8.0 - 16.0 meq/L   Glomerular Filtration Rate, Estimated    Collection Time: 04/02/21  4:19 AM   Result Value Ref Range    Est, Glom Filt Rate 25 (A) ml/min/1.73m2   Osmolality    Collection Time: 04/02/21  4:19 AM   Result Value Ref Range    Osmolality Calc 276.8 275.0 - 300.0 mOsmol/kg   Sedimentation Rate    Collection Time: 04/02/21 10:21 AM   Result Value Ref Range    Sed Rate 9 0 - 20 mm/hr   Sodium, urine, random    Collection Time: 04/02/21  1:20 PM   Result Value Ref Range    Sodium, Ur 125 meq/l   Creatinine, Random Urine    Collection Time: 04/02/21  1:20 PM   Result Value Ref Range    Creatinine, Urine 66.2 mg/dl   Microalbumin / Creatinine Urine Ratio    Collection Time: 04/02/21  1:20 PM   Result Value Ref Range    Microalbumin, Random Urine 11.95 mg/dL    Creatinine, Urine 66.2 mg/dL    Microalb/Creat Ratio 181 (H) 0 - 30 mg/g   Urine Drug Screen    Collection Time: 04/02/21  1:20 PM   Result Value Ref Range    AMPHETAMINE+METHAMPHETAMINE URINE SCREEN Negative NEGATIVE    Barbiturate Quant, Ur Negative NEGATIVE    Benzodiazepine Quant, Ur Negative NEGATIVE    Cannabinoid Quant, Ur Negative NEGATIVE    Cocaine Metab Quant, Ur POSITIVE NEGATIVE    Opiates, Urine Negative NEGATIVE    Oxycodone Negative NEGATIVE    PCP Quant, Ur Negative NEGATIVE   Urine with Reflexed Micro    Collection Time: 04/02/21  1:20 PM   Result Value Ref Range    Glucose, Ur NEGATIVE NEGATIVE mg/dl    Bilirubin Urine NEGATIVE NEGATIVE    Ketones, Urine NEGATIVE NEGATIVE    Specific Gravity, Urine 1.011 1.002 - 1.030    Blood, Urine NEGATIVE NEGATIVE    pH, UA 6.5 5.0 - 9.0    Protein, UA TRACE (A) NEGATIVE    Urobilinogen, Urine 0.2 0.0 - 1.0 eu/dl    Nitrite, Urine NEGATIVE NEGATIVE    Leukocyte Esterase, Urine NEGATIVE NEGATIVE    Color, UA YELLOW STRAW-YELLOW    Character, Urine CLEAR CLEAR-SL CLOUD    RBC, UA NONE SEEN 0-2/hpf /hpf    WBC, UA 0-2 0-4/hpf /hpf    Epithelial Cells, UA 3-5 3-5/hpf /hpf    Bacteria, UA FEW FEW/NONE SEEN /hpf    Casts UA NONE SEEN NONE SEEN /lpf    Crystals, UA NONE SEEN NONE SEEN    Renal Epithelial, UA NONE SEEN NONE SEEN    Yeast, UA NONE SEEN NONE SEEN    CASTS 2 NONE SEEN NONE SEEN /lpf    MISCELLANEOUS 2 NONE SEEN    Basic Metabolic Panel    Collection Time: 04/03/21  2:05 AM   Result Value Ref Range    Sodium 141 135 - 145 meq/L    Potassium 4.9 3.5 - 5.2 meq/L    Chloride 108 98 - 111 meq/L    CO2 22 (L) 23 - 33 meq/L    Glucose 97 70 - 108 mg/dL    BUN 32 (H) 7 - 22 mg/dL    CREATININE 2.5 (H) 0.4 - 1.2 mg/dL    Calcium 8.5 8.5 - 10.5 mg/dL   Anion Gap    Collection Time: 04/03/21  2:05 AM   Result Value Ref Range    Anion Gap 11.0 8.0 - 16.0 meq/L   Glomerular Filtration Rate, Estimated    Collection Time: 04/03/21  2:05 AM   Result Value Ref Range    Est, Glom Filt Rate 25 (A) ml/min/1.73m2       Discharge condition: good  Disposition: Home  Time spent on discharge: 35 minutes    Electronically signed by Blanca Donald DO on 4/3/2021 at 2:24 PM

## 2021-04-03 NOTE — PLAN OF CARE
Problem: Pain:  Goal: Pain level will decrease  Description: Pain level will decrease  4/3/2021 0045 by Israel Stewart RN  Outcome: Ongoing  Note:  Pain meds given prn per [facility]. Pain rated on 0-10 pain rating scale. Will continue to reassess. Problem: Pain:  Goal: Control of acute pain  Description: Control of acute pain  4/3/2021 0045 by Israel Stewart RN  Outcome: Ongoing  Note: Tylenol given PRN for pain. Problem: Pain:  Goal: Control of chronic pain  Description: Control of chronic pain  4/3/2021 0045 by Israel Stewart RN  Outcome: Ongoing  Note: No complaints of Chronic Pain. Problem: Infection:  Goal: Will remain free from infection  Description: Will remain free from infection  4/3/2021 0045 by Israel Stewart RN  Outcome: Ongoing  Note: Pt afebrile. Labs being monitored. Will continue to reassess. Problem: Discharge Planning:  Goal: Patients continuum of care needs are met  Description: Patients continuum of care needs are met  4/3/2021 0045 by Israel Stewart RN  Outcome: Ongoing  Note: Continue to assess discharge needs as they arise. No concerns stated. Patient plans to return home with family. Problem: Cardiovascular  Goal: Hemodynamic stability  4/3/2021 0045 by Israel Stewart RN  Outcome: Ongoing  Note: . Vitals:    04/02/21 2300   BP: (!) 145/96   Pulse: 72   Resp: 18   Temp: 99 °F (37.2 °C)   SpO2: 100%    Vitals every 4 hours & PRN. Problem:   Goal: Adequate urinary output  4/3/2021 0045 by Israel Stewart RN  Outcome: Ongoing  Note: Good Urine output will place hat in toilet. Problem: Skin Integrity/Risk  Goal: No skin breakdown during hospitalization  4/3/2021 0045 by Israel Stewart RN  Outcome: Ongoing  Note: No new problem areas noted to skin. Repositions self throughout the night.       Problem: Falls - Risk of:  Goal: Will remain free from falls  Description: Will remain free from falls  4/3/2021 0045 by Bhanu Soria, RN  Outcome: Ongoing  Note: Falling star program. Bed alarm on zone 2. Call light within reach. Side rails up x2. Encouraged to use call system. Pathway clear. Belongings in reach. Care plan reviewed with patient. Will review and discuss care plan with family when available.

## 2022-07-28 DIAGNOSIS — I10 HYPERTENSION, UNSPECIFIED TYPE: Primary | ICD-10-CM

## 2022-08-26 LAB
ANION GAP SERPL CALCULATED.3IONS-SCNC: 8 MMOL/L (ref 4–12)
BUN BLDV-MCNC: 32 MG/DL (ref 7–20)
CALCIUM SERPL-MCNC: 10.1 MG/DL (ref 8.8–10.5)
CHLORIDE BLD-SCNC: 104 MEQ/L (ref 101–111)
CO2: 25 MEQ/L (ref 21–32)
CREAT SERPL-MCNC: 3.28 MG/DL (ref 0.6–1.3)
CREATININE CLEARANCE: 18
GLUCOSE: 88 MG/DL (ref 70–110)
POTASSIUM SERPL-SCNC: 4.1 MEQ/L (ref 3.6–5)
SODIUM BLD-SCNC: 137 MEQ/L (ref 135–145)

## 2022-08-29 ENCOUNTER — OFFICE VISIT (OUTPATIENT)
Dept: NEPHROLOGY | Age: 52
End: 2022-08-29
Payer: COMMERCIAL

## 2022-08-29 VITALS
SYSTOLIC BLOOD PRESSURE: 102 MMHG | OXYGEN SATURATION: 98 % | HEART RATE: 101 BPM | WEIGHT: 165 LBS | BODY MASS INDEX: 25.84 KG/M2 | DIASTOLIC BLOOD PRESSURE: 73 MMHG

## 2022-08-29 DIAGNOSIS — I12.9 HYPERTENSIVE RENAL DISEASE, STAGE 1 THROUGH STAGE 4 OR UNSPECIFIED CHRONIC KIDNEY DISEASE: ICD-10-CM

## 2022-08-29 DIAGNOSIS — N18.32 CHRONIC KIDNEY DISEASE, STAGE 3B (HCC): ICD-10-CM

## 2022-08-29 DIAGNOSIS — N17.9 AKI (ACUTE KIDNEY INJURY) (HCC): Primary | ICD-10-CM

## 2022-08-29 PROCEDURE — 4004F PT TOBACCO SCREEN RCVD TLK: CPT | Performed by: INTERNAL MEDICINE

## 2022-08-29 PROCEDURE — 3017F COLORECTAL CA SCREEN DOC REV: CPT | Performed by: INTERNAL MEDICINE

## 2022-08-29 PROCEDURE — G8419 CALC BMI OUT NRM PARAM NOF/U: HCPCS | Performed by: INTERNAL MEDICINE

## 2022-08-29 PROCEDURE — 99214 OFFICE O/P EST MOD 30 MIN: CPT | Performed by: INTERNAL MEDICINE

## 2022-08-29 PROCEDURE — G8427 DOCREV CUR MEDS BY ELIG CLIN: HCPCS | Performed by: INTERNAL MEDICINE

## 2022-08-29 RX ORDER — HYDRALAZINE HYDROCHLORIDE 50 MG/1
100 TABLET, FILM COATED ORAL 3 TIMES DAILY
COMMUNITY
Start: 2022-07-08 | End: 2022-08-29 | Stop reason: DRUGHIGH

## 2022-08-29 RX ORDER — HYDRALAZINE HYDROCHLORIDE 100 MG/1
50 TABLET, FILM COATED ORAL 3 TIMES DAILY
Qty: 90 TABLET | Refills: 1 | Status: SHIPPED
Start: 2022-08-29 | End: 2022-09-27 | Stop reason: SDUPTHER

## 2022-08-29 NOTE — PROGRESS NOTES
[facility]    [facility], [address]   [facility], [address] [place]  [phone]  Progress Note  2022       Pt Name:    Whitney Reilly  YOB: 1970  Primary Care Physician:  KORIN Renee - CNP     Chief Complaint:   Chief Complaint   Patient presents with    Follow-up     1 month [facility] follow-up, TERESA/CKD        Background Information/Interval History:   The patient is a 47 y.o. AAF with hx HTN, ovarian cyst who is here for follow-visit. I saw for initial visit back in Oct 2018 but she did not keep her follow-up appts. As stated previously, she has hx left ovarian cyst. She smokes 1/2 ppd for 30+ years. She follows with her cardiologist for aortic aneurysm. She has hx kidney stones. She took ibuprofen and aleve in the past.  She had thyroid surgery in the past. I saw her last in  in office, she did not keep her appts. I saw her at [facility] about a month ago. She is here for follow-up. BP is 102/73. No urinary complaints. No leg swelling. No dysuria. She is on norvasc 10 mg po daily. She has cut back on smoking.       Past History:  Past Medical History:   Diagnosis Date    Aneurysm ([address] 2017    Anxiety     Chronic kidney disease     Dental abscess 2021    right lower jaw/tooth    Eczema     all her life    GERD (gastroesophageal reflux disease)     Hypertension     Mastoiditis     Thyroid disease     having thyroid removed 13     Past Surgical History:   Procedure Laterality Date    CARDIAC SURGERY       SECTION  86 87 90    ENDOSCOPY, COLON, DIAGNOSTIC      HEEL SPUR SURGERY Right     CO EGD TRANSORAL BIOPSY SINGLE/MULTIPLE Left 2018    EGD BIOPSY performed by Nickolas Rivera MD at [facility]    SALPINGECTOMY      tubal pregnancy    THYROIDECTOMY, PARTIAL  2011    TUBAL LIGATION  2010    TUNNELED VENOUS PORT PLACEMENT Left     mediport in chest        VITALS:  /73 (Site: Left Upper Arm, Position: Sitting, Cuff Size: Medium Adult) Pulse (!) 101   Wt 165 lb (74.8 kg)   SpO2 98%   BMI 25.84 kg/m²   Wt Readings from Last 3 Encounters:   08/29/22 165 lb (74.8 kg)   04/03/21 190 lb (86.2 kg)   02/07/21 193 lb (87.5 kg)     Body mass index is 25.84 kg/m². General Appearance: alert and cooperative with exam, appears comfortable, no distress  Oral: moist oral mucus membranes  Neck: No jugular venous distention  Lungs: Air entry B/L, no crackles or rales, no use of accessory muscles  Heart: S1, S2 heard  GI: soft, non-tender, no guarding  Extremities: No sig LE edema     Medications:  Current Outpatient Medications   Medication Sig Dispense Refill    hydrALAZINE (APRESOLINE) 50 MG tablet 100 mg 3 times daily      aspirin (RA ASPIRIN EC) 81 MG EC tablet take 1 tablet by mouth once daily 30 tablet 0    amLODIPine (NORVASC) 10 MG tablet Take 1 tablet by mouth daily 30 tablet 0    acetaminophen (TYLENOL) 500 MG tablet Take 1,000 mg by mouth every 6 hours as needed for Pain      Blood Pressure Monitoring KIT 1 Units by Does not apply route 2 times daily 1 kit 0    atorvastatin (LIPITOR) 20 MG tablet Take 1 tablet by mouth daily (Patient not taking: Reported on 8/29/2022) 30 tablet 0     No current facility-administered medications for this visit. Laboratory & Diagnostics:  Radiology/US kidneys: June 2018: R 9, L 10.3 cm  Old labs reviewed:  July 2018: Creat 1.5  Sept 2018: Creat 2.3, UA: + blood, + protein    April 2019: K 3.7, creat 1.6, UPCR 420 mg/g, UA: trace blood/protein  MARYLIN: (-), complements (-)    Aug 2022: K 4.1, Creat 3.28     Impression/Plan:   1. TERESA on CKD III: ?due to hypotension, volume depletion. Increase water intake. Stop norvasc. Reduce hydralazine 50 mg TId. BP is in low 100s and . Advised to increase fluid intake. Discussed possible causes of TERESA. Will repeat labs. We will check UA and kidney ultrasound. Patient's CKD is likely secondary to HTN nephrosclerosis.  She took ibuprofen and aleve in the past. Discussed in detail. 2. HTN: stop norvasc, reduce hydralazine. Repeat labs. Call me with some blood pressure readings in 1 to 2 days. 3.  History of substance use  4. Hx NSAID use: advised to avoid use of NSAIDs in setting of CKD  5. Smoking: cessation counseling    Orders Placed This Encounter   Procedures    US RENAL COMPLETE    Basic Metabolic Panel    Urinalysis    Protein / creatinine ratio, urine    Basic Metabolic Panel     Return in about 2 months (around 10/29/2022).       Jeremy Jim MD  [facility]

## 2022-08-31 LAB
ANION GAP SERPL CALCULATED.3IONS-SCNC: 5 MMOL/L (ref 4–12)
APPEARANCE: CLEAR
BILIRUBIN URINE: NEGATIVE
BUN BLDV-MCNC: 38 MG/DL (ref 7–20)
CALCIUM SERPL-MCNC: 8.6 MG/DL (ref 8.8–10.5)
CHLORIDE BLD-SCNC: 105 MEQ/L (ref 101–111)
CO2: 24 MEQ/L (ref 21–32)
COLOR: YELLOW
CREAT SERPL-MCNC: 3.5 MG/DL (ref 0.6–1.3)
CREATININE CLEARANCE: 17
CREATININE, RANDOM URINE: 111.3 MG/DL
GLUCOSE URINE: NEGATIVE
GLUCOSE: 95 MG/DL (ref 70–110)
KETONES, URINE: NEGATIVE
LEUKOCYTES, UA: NEGATIVE
MUCUS: PRESENT
NITRITE, URINE: NEGATIVE
PH, URINE: 6 (ref 5–8)
POTASSIUM SERPL-SCNC: 4.1 MEQ/L (ref 3.6–5)
PROTEIN, URINE, RANDOM: 38.7 MG/DL
PROTEIN, URINE: ABNORMAL
PROTEIN/CREAT RATIO: 0.35 G/1.73M2
RBC URINE: ABNORMAL /HPF
SODIUM BLD-SCNC: 134 MEQ/L (ref 135–145)
SPECIFIC GRAVITY UA: 1.01 (ref 1–1.03)
SQUAMOUS EPITHELIAL: ABNORMAL /HPF
URINALYSIS REFLEX: NO
URINE HGB: NEGATIVE
UROBILINOGEN, URINE: NORMAL (ref 0.2–1)
WBC URINE: ABNORMAL /HPF

## 2022-08-31 NOTE — RESULT ENCOUNTER NOTE
Pt with complex renal cyst  Will discuss with patient in office  Will avoid contrast due to Advanced CKD  Will discuss urology referral with her in office with next visit.

## 2022-08-31 NOTE — RESULT ENCOUNTER NOTE
Repeat BMP next week on Tuesday  Increase water intake  How is her BP now?   Check urine sodium and urine chloride

## 2022-09-01 ENCOUNTER — TELEPHONE (OUTPATIENT)
Dept: NEPHROLOGY | Age: 52
End: 2022-09-01

## 2022-09-01 DIAGNOSIS — N18.32 CHRONIC KIDNEY DISEASE, STAGE 3B (HCC): Primary | ICD-10-CM

## 2022-09-01 NOTE — TELEPHONE ENCOUNTER
Pt states /125 this morning without morning medication. She normally only checks her BP once or twice a week. I advised her to start checking twice a day and keep a log. She stated that she can do that. Pt understands to repeat labs next Tuesday and increase water intake. Lab orders pending.

## 2022-09-01 NOTE — TELEPHONE ENCOUNTER
Left message for pt regarding getting labs, increase fluid intake, and see if she was able to get her BP checked. Asked for a call back. Lab order pending.

## 2022-09-01 NOTE — TELEPHONE ENCOUNTER
----- Message from Floyd Rosas MD sent at 8/31/2022  6:09 PM EDT -----  Repeat BMP next week on Tuesday  Increase water intake  How is her BP now?   Check urine sodium and urine chloride

## 2022-09-02 RX ORDER — AMLODIPINE BESYLATE 5 MG/1
5 TABLET ORAL 2 TIMES DAILY
COMMUNITY

## 2022-09-02 NOTE — TELEPHONE ENCOUNTER
Agree with checking more often and calling with some readings  For now start (resume) norvasc 5 mg BID

## 2022-09-07 NOTE — TELEPHONE ENCOUNTER
Left message to confirm that pt has Norvasc at home or does she need a script. Asked for a call back to confirm pharmacy.

## 2022-09-13 LAB
ANION GAP SERPL CALCULATED.3IONS-SCNC: 6 MMOL/L (ref 4–12)
BUN BLDV-MCNC: 28 MG/DL (ref 7–20)
CALCIUM SERPL-MCNC: 9.7 MG/DL (ref 8.8–10.5)
CHLORIDE BLD-SCNC: 104 MEQ/L (ref 101–111)
CHLORIDE, URINE: 95 MEQ/L
CO2: 25 MEQ/L (ref 21–32)
CREAT SERPL-MCNC: 2.52 MG/DL (ref 0.6–1.3)
CREATININE CLEARANCE: 24
GLUCOSE: 140 MG/DL (ref 70–110)
POTASSIUM SERPL-SCNC: 4.5 MEQ/L (ref 3.6–5)
SODIUM BLD-SCNC: 135 MEQ/L (ref 135–145)
SODIUM,UR: 97 MEQ/L

## 2022-09-27 RX ORDER — HYDRALAZINE HYDROCHLORIDE 50 MG/1
50 TABLET, FILM COATED ORAL 3 TIMES DAILY
Qty: 90 TABLET | Refills: 1 | Status: SHIPPED | OUTPATIENT
Start: 2022-09-27

## 2022-10-04 ENCOUNTER — HOSPITAL ENCOUNTER (EMERGENCY)
Age: 52
Discharge: HOME | End: 2022-10-04
Payer: OTHER MISCELLANEOUS

## 2022-10-04 VITALS
RESPIRATION RATE: 18 BRPM | HEART RATE: 88 BPM | SYSTOLIC BLOOD PRESSURE: 145 MMHG | OXYGEN SATURATION: 100 % | TEMPERATURE: 97 F | DIASTOLIC BLOOD PRESSURE: 84 MMHG

## 2022-10-04 DIAGNOSIS — S39.012A STRAIN OF LUMBAR REGION, INITIAL ENCOUNTER: Primary | ICD-10-CM

## 2022-10-04 DIAGNOSIS — M54.32 SCIATICA OF LEFT SIDE: ICD-10-CM

## 2022-10-04 PROCEDURE — 99213 OFFICE O/P EST LOW 20 MIN: CPT | Performed by: NURSE PRACTITIONER

## 2022-10-04 PROCEDURE — 99213 OFFICE O/P EST LOW 20 MIN: CPT

## 2022-10-04 RX ORDER — CYCLOBENZAPRINE HCL 10 MG
10 TABLET ORAL 3 TIMES DAILY PRN
Qty: 30 TABLET | Refills: 0 | Status: SHIPPED | OUTPATIENT
Start: 2022-10-04 | End: 2022-10-07

## 2022-10-04 RX ORDER — PREDNISONE 20 MG/1
50 TABLET ORAL DAILY
Qty: 13 TABLET | Refills: 0 | Status: SHIPPED | OUTPATIENT
Start: 2022-10-04 | End: 2022-10-09

## 2022-10-04 ASSESSMENT — ENCOUNTER SYMPTOMS
BACK PAIN: 1
SHORTNESS OF BREATH: 0
WHEEZING: 0
ABDOMINAL PAIN: 0
EYE PAIN: 0
RHINORRHEA: 0
DIARRHEA: 0
NAUSEA: 0
COUGH: 0
CONSTIPATION: 0
BLOOD IN STOOL: 0
VOMITING: 0
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
## 2022-10-04 NOTE — DISCHARGE INSTRUCTIONS
Go to ER for worsening symptoms, chest pain, shortness of breath, inability keep liquids down, inability urinate for greater than 8 hours or numbness and tingling in the extremities. May take Tylenol or ibuprofen as needed for pain. Follow-up with your primary care provider. May apply ice or heat for comfort for 15 minutes at a time up to 4 times a day.
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
## 2022-10-04 NOTE — Clinical Note
Eugenio Mak was seen and treated in our emergency department on 10/4/2022. She may return to work on 10/05/2022. If you have any questions or concerns, please don't hesitate to call.       Bogdan Torres, APRN - CNP

## 2022-10-07 ENCOUNTER — HOSPITAL ENCOUNTER (EMERGENCY)
Age: 52
Discharge: HOME | End: 2022-10-07
Payer: COMMERCIAL

## 2022-10-07 VITALS
OXYGEN SATURATION: 100 % | HEART RATE: 78 BPM | SYSTOLIC BLOOD PRESSURE: 162 MMHG | HEIGHT: 67 IN | BODY MASS INDEX: 25.9 KG/M2 | RESPIRATION RATE: 16 BRPM | WEIGHT: 165 LBS | DIASTOLIC BLOOD PRESSURE: 104 MMHG | TEMPERATURE: 97.6 F

## 2022-10-07 DIAGNOSIS — M54.32 SCIATICA OF LEFT SIDE: Primary | ICD-10-CM

## 2022-10-07 PROCEDURE — 99213 OFFICE O/P EST LOW 20 MIN: CPT

## 2022-10-07 PROCEDURE — 99212 OFFICE O/P EST SF 10 MIN: CPT | Performed by: NURSE PRACTITIONER

## 2022-10-07 RX ORDER — TIZANIDINE 4 MG/1
4 TABLET ORAL 4 TIMES DAILY PRN
Qty: 20 TABLET | Refills: 0 | Status: SHIPPED | OUTPATIENT
Start: 2022-10-07 | End: 2022-10-12

## 2022-10-07 ASSESSMENT — ENCOUNTER SYMPTOMS
BACK PAIN: 1
ABDOMINAL PAIN: 0
BOWEL INCONTINENCE: 0
ABDOMINAL SWELLING: 0

## 2022-10-07 ASSESSMENT — PAIN DESCRIPTION - LOCATION: LOCATION: LEG

## 2022-10-07 ASSESSMENT — PAIN DESCRIPTION - PAIN TYPE: TYPE: ACUTE PAIN

## 2022-10-07 ASSESSMENT — PAIN DESCRIPTION - ORIENTATION: ORIENTATION: LEFT

## 2022-10-07 ASSESSMENT — PAIN - FUNCTIONAL ASSESSMENT
PAIN_FUNCTIONAL_ASSESSMENT: PREVENTS OR INTERFERES SOME ACTIVE ACTIVITIES AND ADLS
PAIN_FUNCTIONAL_ASSESSMENT: 0-10

## 2022-10-07 ASSESSMENT — PAIN SCALES - GENERAL: PAINLEVEL_OUTOF10: 8

## 2022-10-07 ASSESSMENT — PAIN DESCRIPTION - DESCRIPTORS: DESCRIPTORS: DISCOMFORT;ACHING

## 2022-10-07 NOTE — ED PROVIDER NOTES
[facility]  Urgent Care Encounter      CHIEF COMPLAINT       Chief Complaint   Patient presents with    Leg Pain     Left sciatica       Nurses Notes reviewed and I agree except as noted in the HPI. HISTORY OFPRESENT ILLNESS   Venecia Robertson is a 47 y.o. The history is provided by the patient. No  was used. Back Pain  Location:  Sacro-iliac joint  Quality:  Shooting and burning  Radiates to:  L posterior upper leg and L thigh  Pain severity:  Severe  Pain is:  Same all the time  Onset quality:  Gradual  Duration: worse today after returning to work, pushing heavy cart. Timing:  Constant  Progression:  Worsening  Chronicity:  New  Context: lifting heavy objects, physical stress and twisting    Context: not emotional stress, not falling, not jumping from heights, not MCA, not MVA, not occupational injury, not pedestrian accident, not recent illness and not recent injury    Relieved by:  Nothing  Worsened by:  Nothing  Ineffective treatments:  None tried  Associated symptoms: leg pain and numbness    Associated symptoms: no abdominal pain, no abdominal swelling, no bladder incontinence, no bowel incontinence, no chest pain, no dysuria, no fever, no headaches, no paresthesias, no pelvic pain, no perianal numbness, no tingling, no weakness and no weight loss      REVIEW OF SYSTEMS     Review of Systems   Constitutional:  Negative for fever and weight loss. Cardiovascular:  Negative for chest pain. Gastrointestinal:  Negative for abdominal pain and bowel incontinence. Genitourinary:  Negative for bladder incontinence, dysuria and pelvic pain. Musculoskeletal:  Positive for back pain. Neurological:  Positive for numbness. Negative for tingling, weakness, headaches and paresthesias.      PAST MEDICAL HISTORY         Diagnosis Date    Aneurysm ([facility] [address] 08/2017    Anxiety     Chronic kidney disease     Dental abscess 02/07/2021    right lower jaw/tooth    Eczema     all her life    GERD (gastroesophageal reflux disease)     Hypertension     Mastoiditis     Thyroid disease     having thyroid removed 14       SURGICAL HISTORY     Patient  has a past surgical history that includes  section (86 87 90); salpingectomy; Tubal ligation (); Tunneled venous port placement (Left, ); Thyroidectomy, partial (); Heel spur surgery (Right); pr egd transoral biopsy single/multiple (Left, 2018); Endoscopy, colon, diagnostic; and Cardiac surgery. CURRENT MEDICATIONS       Discharge Medication List as of 10/7/2022 12:50 PM        CONTINUE these medications which have NOT CHANGED    Details   predniSONE (DELTASONE) 20 MG tablet Take 2.5 tablets by mouth daily for 5 days, Disp-13 tablet, R-0Normal      hydrALAZINE (APRESOLINE) 50 MG tablet Take 1 tablet by mouth 3 times daily, Disp-90 tablet, R-1Normal      amLODIPine (NORVASC) 5 MG tablet Take 5 mg by mouth in the morning and at bedtimeHistorical Med      aspirin (RA ASPIRIN EC) 81 MG EC tablet take 1 tablet by mouth once daily, Disp-30 tablet, R-0Normal      acetaminophen (TYLENOL) 500 MG tablet Take 1,000 mg by mouth every 6 hours as needed for PainHistorical Med      Blood Pressure Monitoring KIT 2 TIMES DAILY Starting Wed 2019, Disp-1 kit, R-0, Print             ALLERGIES     Patient is is allergic to fish allergy, fish-derived products, and fioricet [butalbital-apap-caffeine]. FAMILY HISTORY     Patient's family history includes Cancer in her maternal grandfather and mother; Diabetes in her paternal grandfather; Heart Disease in her paternal grandfather. SOCIAL HISTORY     Patient  reports that she has been smoking cigarettes. She has a 16.50 pack-year smoking history. She has never used smokeless tobacco. She reports current alcohol use. She reports current drug use. Drug: Marijuana Nadia [employer]).     PHYSICAL EXAM     ED TRIAGE VITALS  BP: (!) 162/104, Temp: 97.6 °F (36.4 °C), Heart Rate: 78, Resp: 16, SpO2: 100 %  Physical Exam  Vitals and nursing note reviewed. Constitutional:       General: She is not in acute distress. Appearance: Normal appearance. She is normal weight. She is not ill-appearing, toxic-appearing or diaphoretic. HENT:      Head: Normocephalic and atraumatic. Right Ear: External ear normal.      Left Ear: External ear normal.   Eyes:      Extraocular Movements: Extraocular movements intact. Conjunctiva/sclera: Conjunctivae normal.   Cardiovascular:      Pulses:           Dorsalis pedis pulses are 2+ on the right side and 2+ on the left side. Posterior tibial pulses are 2+ on the right side and 2+ on the left side. Pulmonary:      Effort: Pulmonary effort is normal.   Musculoskeletal:      Lumbar back: Spasms and tenderness present. Positive left straight leg raise test.   Neurological:      General: No focal deficit present. Mental Status: She is alert and oriented to person, place, and time. Psychiatric:         Mood and Affect: Mood normal.         Behavior: Behavior normal.         Thought Content: Thought content normal.         Judgment: Judgment normal.       DIAGNOSTIC RESULTS   Labs:No results found for this visit on 10/07/22. IMAGING:  No orders to display     URGENT CARE COURSE:     Vitals:    10/07/22 1230 10/07/22 1233   BP: (!) 162/102 (!) 162/104   Pulse: 78    Resp: 16    Temp: 97.6 °F (36.4 °C)    TempSrc: Temporal    SpO2: 100%    Weight: 165 lb (74.8 kg)    Height: 5' 7\" (1.702 m)        Medications - No data to display  PROCEDURES:  None  FINAL IMPRESSION      1. Sciatica of left side        DISPOSITION/PLAN   Decision To Discharge     The patient will be instructed to continue taking anti-inflammatory medication, and given a Rx for short course of muscle relaxer's.    Rest,Ice 15-20 minutes TID x 2 days,Then Heat 15-20 minutes TID as needed The patient will be given back stretching exercises, and instructed to follow up with their PCP or [facility] clinic for further evaluation. The patient should return to the ED if the back pain worsens, or if they experience incontinence, numbness or tingling in the legs, or inability to ambulate. The patient is in agreement with this plan. The patient tolerated their visit well. The patient and / or the family were informed of the results of any tests, a time was given to answer questions, a plan was proposed and they agreed with plan. Follow up with PCP ×2-3 days for reevaluation and further management of care.     PATIENT REFERRED TO:  KORIN Chase CNP  [address] [place] [address]  [phone]    Schedule an appointment as soon as possible for a visit     DISCHARGE MEDICATIONS:  Discharge Medication List as of 10/7/2022 12:50 PM        START taking these medications    Details   tiZANidine (ZANAFLEX) 4 MG tablet Take 1 tablet by mouth 4 times daily as needed (muscle spasm), Disp-20 tablet, R-0Normal           Discharge Medication List as of 10/7/2022 12:50 PM          KORIN Ellington CNP, APRN - CNP  10/07/22 1310

## 2022-10-07 NOTE — DISCHARGE INSTRUCTIONS
The patient will be instructed to continue taking anti-inflammatory medication, and given a Rx for short course of muscle relaxer's. Rest,Ice 15-20 minutes TID x 2 days,Then Heat 15-20 minutes TID as needed The patient will be given back stretching exercises, and instructed to follow up with their PCP or community clinic for further evaluation. The patient should return to the ED if the back pain worsens, or if they experience incontinence, numbness or tingling in the legs, or inability to ambulate. The patient is in agreement with this plan. The patient tolerated their visit well. The patient and / or the family were informed of the results of any tests, a time was given to answer questions, a plan was proposed and they agreed with plan.  Follow up with PCP ×2-3 days for reevaluation and further management of care

## 2022-10-07 NOTE — ED TRIAGE NOTES
Patient ambulated to room with complaint of left sciatica pain. States she was seen last Tuesday and is taking flexeril and prednisone but symptoms continue.

## 2022-10-07 NOTE — Clinical Note
Darin Kee was seen and treated in our emergency department on 10/7/2022. She may return to work on 10/10/2022. If you have any questions or concerns, please don't hesitate to call.       Kathy Lopez, KORIN - CNP

## 2022-11-25 RX ORDER — HYDRALAZINE HYDROCHLORIDE 50 MG/1
TABLET, FILM COATED ORAL
Qty: 90 TABLET | Refills: 3 | Status: SHIPPED | OUTPATIENT
Start: 2022-11-25

## 2023-02-20 RX ORDER — AMLODIPINE BESYLATE 5 MG/1
5 TABLET ORAL 2 TIMES DAILY
Qty: 60 TABLET | Refills: 0 | Status: SHIPPED | OUTPATIENT
Start: 2023-02-20

## 2023-06-27 DIAGNOSIS — N18.32 CHRONIC KIDNEY DISEASE, STAGE 3B (HCC): Primary | ICD-10-CM

## 2023-07-03 RX ORDER — AMLODIPINE BESYLATE 5 MG/1
5 TABLET ORAL 2 TIMES DAILY
Qty: 60 TABLET | Refills: 0 | Status: SHIPPED | OUTPATIENT
Start: 2023-07-03

## 2023-07-18 ENCOUNTER — TELEPHONE (OUTPATIENT)
Dept: NEPHROLOGY | Age: 53
End: 2023-07-18

## 2023-07-18 DIAGNOSIS — N17.9 AKI (ACUTE KIDNEY INJURY) (HCC): Primary | ICD-10-CM

## 2023-07-18 NOTE — TELEPHONE ENCOUNTER
Pt has an appt 7/25/23 with you and hospital told her asap. Do you want me to change her appt or keep next weeks appt?

## 2023-07-18 NOTE — TELEPHONE ENCOUNTER
Spoke to pt, she will be in [address] [place] [address] [id number] 7/20/23 at 11:40 am.    Lab orders pending

## 2023-07-19 LAB
ABSOLUTE BASO #: 0 /CMM (ref 0–200)
ABSOLUTE EOS #: 100 /CMM (ref 0–500)
ABSOLUTE LYMPH #: 1000 /CMM (ref 1000–4800)
ABSOLUTE MONO #: 300 /CMM (ref 0–800)
ABSOLUTE NEUT #: 1900 /CMM (ref 1800–7700)
ANION GAP SERPL CALCULATED.3IONS-SCNC: 4 MMOL/L (ref 4–12)
APPEARANCE: CLEAR
BASOPHILS RELATIVE PERCENT: 1.1 % (ref 0–2)
BILIRUBIN URINE: NEGATIVE
BUN BLDV-MCNC: 42 MG/DL (ref 7–20)
CALCIUM SERPL-MCNC: 8.6 MG/DL (ref 8.8–10.5)
CHLORIDE BLD-SCNC: 110 MEQ/L (ref 101–111)
CO2: 25 MEQ/L (ref 21–32)
COLOR: COLORLESS
CREAT SERPL-MCNC: 3.34 MG/DL (ref 0.6–1.3)
CREATININE CLEARANCE: 18
EOSINOPHILS RELATIVE PERCENT: 1.8 % (ref 0–6)
GLUCOSE URINE: NEGATIVE
GLUCOSE: 92 MG/DL (ref 70–110)
HCT VFR BLD CALC: 32.9 % (ref 35–44)
HEMOGLOBIN: 11 GM/DL (ref 12–15)
KETONES, URINE: NEGATIVE
LEUKOCYTES, UA: NEGATIVE
LYMPHOCYTES RELATIVE PERCENT: 29.8 % (ref 15–45)
MCH RBC QN AUTO: 29.2 PG (ref 27.5–33)
MCHC RBC AUTO-ENTMCNC: 33.3 GM/DL (ref 33–36)
MCV RBC AUTO: 87.7 CU MIC (ref 80–97)
MONOCYTES RELATIVE PERCENT: 10.1 % (ref 2–10)
NEUTROPHILS RELATIVE PERCENT: 57.2 % (ref 40–70)
NITRITE, URINE: NEGATIVE
NUCLEATED RBCS: 0 /100 WBC
PARATHYROID HORMONE INTACT: 246.5 U/ML (ref 12–88)
PDW BLD-RTO: 15.6 % (ref 12–16)
PH, URINE: 7 (ref 5–8)
PHOSPHORUS: 3 MG/DL (ref 2.4–4.7)
PLATELET # BLD: 184 TH/CMM (ref 150–400)
POTASSIUM SERPL-SCNC: 4.9 MEQ/L (ref 3.6–5)
PROTEIN, URINE: NORMAL
RBC # BLD: 3.75 MIL/CMM (ref 4–5.1)
RBC URINE: NORMAL /HPF
SODIUM BLD-SCNC: 139 MEQ/L (ref 135–145)
SPECIFIC GRAVITY UA: 1.01 (ref 1–1.03)
SQUAMOUS EPITHELIAL: NORMAL /HPF
URINALYSIS REFLEX: NO
URINE HGB: NEGATIVE
UROBILINOGEN, URINE: NORMAL (ref 0.2–1)
WBC # BLD: 3.4 TH/CMM (ref 4.4–10.5)
WBC URINE: NORMAL /HPF

## 2023-07-20 ENCOUNTER — OFFICE VISIT (OUTPATIENT)
Dept: NEPHROLOGY | Age: 53
End: 2023-07-20
Payer: COMMERCIAL

## 2023-07-20 VITALS
BODY MASS INDEX: 23.12 KG/M2 | DIASTOLIC BLOOD PRESSURE: 99 MMHG | SYSTOLIC BLOOD PRESSURE: 130 MMHG | HEART RATE: 97 BPM | WEIGHT: 147.6 LBS | OXYGEN SATURATION: 100 %

## 2023-07-20 DIAGNOSIS — N18.4 CKD (CHRONIC KIDNEY DISEASE), STAGE IV (HCC): Primary | ICD-10-CM

## 2023-07-20 DIAGNOSIS — I12.9 HYPERTENSIVE RENAL DISEASE, STAGE 1 THROUGH STAGE 4 OR UNSPECIFIED CHRONIC KIDNEY DISEASE: ICD-10-CM

## 2023-07-20 PROCEDURE — G8427 DOCREV CUR MEDS BY ELIG CLIN: HCPCS | Performed by: INTERNAL MEDICINE

## 2023-07-20 PROCEDURE — 99213 OFFICE O/P EST LOW 20 MIN: CPT | Performed by: INTERNAL MEDICINE

## 2023-07-20 PROCEDURE — 3080F DIAST BP >= 90 MM HG: CPT | Performed by: INTERNAL MEDICINE

## 2023-07-20 PROCEDURE — G8420 CALC BMI NORM PARAMETERS: HCPCS | Performed by: INTERNAL MEDICINE

## 2023-07-20 PROCEDURE — 3017F COLORECTAL CA SCREEN DOC REV: CPT | Performed by: INTERNAL MEDICINE

## 2023-07-20 PROCEDURE — 3075F SYST BP GE 130 - 139MM HG: CPT | Performed by: INTERNAL MEDICINE

## 2023-07-20 PROCEDURE — 4004F PT TOBACCO SCREEN RCVD TLK: CPT | Performed by: INTERNAL MEDICINE

## 2023-07-20 RX ORDER — HYDRALAZINE HYDROCHLORIDE 50 MG/1
50 TABLET, FILM COATED ORAL 3 TIMES DAILY
Qty: 90 TABLET | Refills: 3 | Status: SHIPPED | OUTPATIENT
Start: 2023-07-20

## 2023-07-20 RX ORDER — AMLODIPINE BESYLATE 10 MG/1
10 TABLET ORAL DAILY
Qty: 30 TABLET | Refills: 3 | Status: SHIPPED | OUTPATIENT
Start: 2023-07-20

## 2023-08-22 ENCOUNTER — TELEPHONE (OUTPATIENT)
Dept: NEPHROLOGY | Age: 53
End: 2023-08-22

## 2023-08-22 NOTE — TELEPHONE ENCOUNTER
Christine Tate called the office today to inform us that she is currently in rehab. She states Angelique Cowart will be there for awhile. \" She needed to reschedule her appointment for this week. Rescheduled her on 9/22/23. Reminded her she will need her labs drawn one week prior. She missed her renal ultrasound appointment. I gave her the contact phone number to reschedule the [address] before her next appointment.

## 2023-08-29 ENCOUNTER — APPOINTMENT (OUTPATIENT)
Dept: CT IMAGING | Age: 53
End: 2023-08-29
Payer: COMMERCIAL

## 2023-08-29 ENCOUNTER — HOSPITAL ENCOUNTER (EMERGENCY)
Age: 53
Discharge: HOME | End: 2023-08-29
Payer: COMMERCIAL

## 2023-08-29 VITALS
TEMPERATURE: 98.1 F | OXYGEN SATURATION: 96 % | HEART RATE: 82 BPM | BODY MASS INDEX: 23.49 KG/M2 | RESPIRATION RATE: 15 BRPM | DIASTOLIC BLOOD PRESSURE: 93 MMHG | WEIGHT: 150 LBS | SYSTOLIC BLOOD PRESSURE: 144 MMHG

## 2023-08-29 DIAGNOSIS — K57.90 DIVERTICULOSIS: ICD-10-CM

## 2023-08-29 DIAGNOSIS — R51.9 SINUS HEADACHE: ICD-10-CM

## 2023-08-29 DIAGNOSIS — J01.00 ACUTE MAXILLARY SINUSITIS, RECURRENCE NOT SPECIFIED: Primary | ICD-10-CM

## 2023-08-29 LAB
ALBUMIN SERPL BCG-MCNC: 4.1 G/DL (ref 3.5–5.1)
ALP SERPL-CCNC: 60 U/L (ref 38–126)
ALT SERPL W/O P-5'-P-CCNC: 9 U/L (ref 11–66)
ANION GAP SERPL CALC-SCNC: 11 MEQ/L (ref 8–16)
AST SERPL-CCNC: 13 U/L (ref 5–40)
BASOPHILS ABSOLUTE: 0 THOU/MM3 (ref 0–0.1)
BASOPHILS NFR BLD AUTO: 0.9 %
BILIRUB SERPL-MCNC: 0.6 MG/DL (ref 0.3–1.2)
BUN SERPL-MCNC: 33 MG/DL (ref 7–22)
CALCIUM SERPL-MCNC: 9.5 MG/DL (ref 8.5–10.5)
CHLORIDE SERPL-SCNC: 105 MEQ/L (ref 98–111)
CO2 SERPL-SCNC: 24 MEQ/L (ref 23–33)
CREAT SERPL-MCNC: 3.1 MG/DL (ref 0.4–1.2)
DEPRECATED RDW RBC AUTO: 50.4 FL (ref 35–45)
EOSINOPHIL NFR BLD AUTO: 1.3 %
EOSINOPHILS ABSOLUTE: 0 THOU/MM3 (ref 0–0.4)
ERYTHROCYTE [DISTWIDTH] IN BLOOD BY AUTOMATED COUNT: 14.7 % (ref 11.5–14.5)
GFR SERPL CREATININE-BSD FRML MDRD: 17 ML/MIN/1.73M2
GLUCOSE SERPL-MCNC: 90 MG/DL (ref 70–108)
HCT VFR BLD AUTO: 39.3 % (ref 37–47)
HGB BLD-MCNC: 12 GM/DL (ref 12–16)
IMM GRANULOCYTES # BLD AUTO: 0.01 THOU/MM3 (ref 0–0.07)
IMM GRANULOCYTES NFR BLD AUTO: 0.4 %
LIPASE SERPL-CCNC: 67.4 U/L (ref 5.6–51.3)
LYMPHOCYTES ABSOLUTE: 0.7 THOU/MM3 (ref 1–4.8)
LYMPHOCYTES NFR BLD AUTO: 29.8 %
MCH RBC QN AUTO: 28.2 PG (ref 26–33)
MCHC RBC AUTO-ENTMCNC: 30.5 GM/DL (ref 32.2–35.5)
MCV RBC AUTO: 92.5 FL (ref 81–99)
MONOCYTES ABSOLUTE: 0.3 THOU/MM3 (ref 0.4–1.3)
MONOCYTES NFR BLD AUTO: 11.1 %
NEUTROPHILS NFR BLD AUTO: 56.5 %
NRBC BLD AUTO-RTO: 0 /100 WBC
OSMOLALITY SERPL CALC.SUM OF ELEC: 286.2 MOSMOL/KG (ref 275–300)
PLATELET # BLD AUTO: 171 THOU/MM3 (ref 130–400)
PMV BLD AUTO: 11.2 FL (ref 9.4–12.4)
POTASSIUM SERPL-SCNC: 5 MEQ/L (ref 3.5–5.2)
PROT SERPL-MCNC: 6.5 G/DL (ref 6.1–8)
RBC # BLD AUTO: 4.25 MILL/MM3 (ref 4.2–5.4)
SEGMENTED NEUTROPHILS ABSOLUTE COUNT: 1.4 THOU/MM3 (ref 1.8–7.7)
SODIUM SERPL-SCNC: 140 MEQ/L (ref 135–145)
WBC # BLD AUTO: 2.4 THOU/MM3 (ref 4.8–10.8)

## 2023-08-29 PROCEDURE — 83690 ASSAY OF LIPASE: CPT

## 2023-08-29 PROCEDURE — 6370000000 HC RX 637 (ALT 250 FOR IP)

## 2023-08-29 PROCEDURE — 80053 COMPREHEN METABOLIC PANEL: CPT

## 2023-08-29 PROCEDURE — 85025 COMPLETE CBC W/AUTO DIFF WBC: CPT

## 2023-08-29 PROCEDURE — 99284 EMERGENCY DEPT VISIT MOD MDM: CPT

## 2023-08-29 PROCEDURE — 36415 COLL VENOUS BLD VENIPUNCTURE: CPT

## 2023-08-29 PROCEDURE — 74176 CT ABD & PELVIS W/O CONTRAST: CPT

## 2023-08-29 RX ORDER — 0.9 % SODIUM CHLORIDE 0.9 %
1000 INTRAVENOUS SOLUTION INTRAVENOUS ONCE
Status: DISCONTINUED | OUTPATIENT
Start: 2023-08-29 | End: 2023-08-29

## 2023-08-29 RX ORDER — AMOXICILLIN 875 MG/1
875 TABLET, COATED ORAL 2 TIMES DAILY
Qty: 14 TABLET | Refills: 0 | Status: SHIPPED | OUTPATIENT
Start: 2023-08-29 | End: 2023-09-05

## 2023-08-29 RX ORDER — ONDANSETRON 4 MG/1
4 TABLET, FILM COATED ORAL EVERY 8 HOURS PRN
Qty: 15 TABLET | Refills: 0 | Status: SHIPPED | OUTPATIENT
Start: 2023-08-29

## 2023-08-29 RX ORDER — METHYLPREDNISOLONE 4 MG/1
TABLET ORAL
Qty: 21 TABLET | Refills: 0 | Status: SHIPPED | OUTPATIENT
Start: 2023-08-29 | End: 2023-09-04

## 2023-08-29 RX ORDER — ONDANSETRON 4 MG/1
4 TABLET, ORALLY DISINTEGRATING ORAL ONCE
Status: COMPLETED | OUTPATIENT
Start: 2023-08-29 | End: 2023-08-29

## 2023-08-29 RX ADMIN — ONDANSETRON 4 MG: 4 TABLET, ORALLY DISINTEGRATING ORAL at 09:26

## 2023-08-29 ASSESSMENT — PAIN SCALES - GENERAL: PAINLEVEL_OUTOF10: 6

## 2023-08-29 ASSESSMENT — PAIN DESCRIPTION - LOCATION: LOCATION: EAR;HEAD;NECK

## 2023-08-29 ASSESSMENT — PAIN DESCRIPTION - DESCRIPTORS: DESCRIPTORS: ACHING

## 2023-08-29 NOTE — ED PROVIDER NOTES
[facility] EMERGENCY DEPT      EMERGENCY MEDICINE     Pt Name: Aurea Allen  MRN: [id number]  [address] [facility] 1970  Date of evaluation: 8/29/2023  Provider: Dang Velasco PA-C    CHIEF COMPLAINT       Chief Complaint   Patient presents with    Sinusitis    Headache     HISTORY OF PRESENT ILLNESS   Aurea Allen is a 47 y.o. female with Hx of diverticulitis who presents to the ED for evaluation of abdominal pain and headache onset yesterday. Patient states she developed nausea and vomiting with associated left-sided abdominal pain yesterday. Patient states that she has not had any vomiting today but is still nauseous and having abdominal pain. Patient states that she has a history of sinusitis associated with headaches and states that for the past day she has been having generalized headache, sinus pressure, and sinus drainage. Patient denies fever, chills, body aches, cough, congestion, sore throat, shortness of breath, chest pain, change in bowels, dysuria, neck pain, back pain, and dizziness.         PASTMEDICAL HISTORY     Past Medical History:   Diagnosis Date    Aneurysm ([address] [facility]) 08/2017    Anxiety     Chronic kidney disease     Dental abscess 02/07/2021    right lower jaw/tooth    Eczema     all her life    GERD (gastroesophageal reflux disease)     Hypertension     Mastoiditis     Thyroid disease     having thyroid removed 7/23/13       Patient Active Problem List   Diagnosis Code    Hypertension I10    Eczema L30.9    Acute kidney injury superimposed on chronic kidney disease (HCC) N17.9, N18.9    Dyspnea R06.00    Obesity E66.9    History of URI (upper respiratory infection) Z87.09    Hyponatremia E87.1    Nicotine dependence F17.200    Angina, class II (HCC) I20.9    Abdominal pain R10.9    Polysubstance abuse ([address]) F19.10    Hypertensive emergency I16.1    Non compliance w medication regimen Z91.148     SURGICAL HISTORY       Past Surgical History:   Procedure Laterality Date    CARDIAC SURGERY

## 2023-09-05 ENCOUNTER — HOSPITAL ENCOUNTER (OUTPATIENT)
Dept: ULTRASOUND IMAGING | Age: 53
Discharge: HOME | End: 2023-09-05
Attending: INTERNAL MEDICINE
Payer: COMMERCIAL

## 2023-09-05 DIAGNOSIS — I12.9 HYPERTENSIVE RENAL DISEASE, STAGE 1 THROUGH STAGE 4 OR UNSPECIFIED CHRONIC KIDNEY DISEASE: ICD-10-CM

## 2023-09-05 DIAGNOSIS — N18.4 CKD (CHRONIC KIDNEY DISEASE), STAGE IV (HCC): ICD-10-CM

## 2023-09-05 PROCEDURE — 76770 US EXAM ABDO BACK WALL COMP: CPT

## 2023-09-11 ENCOUNTER — HOSPITAL ENCOUNTER (EMERGENCY)
Age: 53
Discharge: HOME | End: 2023-09-11
Attending: FAMILY MEDICINE
Payer: COMMERCIAL

## 2023-09-11 ENCOUNTER — APPOINTMENT (OUTPATIENT)
Dept: CT IMAGING | Age: 53
End: 2023-09-11
Payer: COMMERCIAL

## 2023-09-11 VITALS
WEIGHT: 151 LBS | DIASTOLIC BLOOD PRESSURE: 87 MMHG | TEMPERATURE: 98.3 F | HEART RATE: 70 BPM | RESPIRATION RATE: 19 BRPM | OXYGEN SATURATION: 99 % | BODY MASS INDEX: 23.7 KG/M2 | SYSTOLIC BLOOD PRESSURE: 127 MMHG | HEIGHT: 67 IN

## 2023-09-11 DIAGNOSIS — R10.12 LEFT UPPER QUADRANT ABDOMINAL PAIN: ICD-10-CM

## 2023-09-11 DIAGNOSIS — R10.9 ACUTE LEFT FLANK PAIN: Primary | ICD-10-CM

## 2023-09-11 LAB
ALBUMIN SERPL BCG-MCNC: 3.8 G/DL (ref 3.5–5.1)
ALP SERPL-CCNC: 62 U/L (ref 38–126)
ALT SERPL W/O P-5'-P-CCNC: 9 U/L (ref 11–66)
ANION GAP SERPL CALC-SCNC: 11 MEQ/L (ref 8–16)
AST SERPL-CCNC: 10 U/L (ref 5–40)
BACTERIA URNS QL MICRO: ABNORMAL /HPF
BASOPHILS ABSOLUTE: 0 THOU/MM3 (ref 0–0.1)
BASOPHILS NFR BLD AUTO: 0.8 %
BILIRUB SERPL-MCNC: 0.2 MG/DL (ref 0.3–1.2)
BILIRUB UR QL STRIP.AUTO: NEGATIVE
BUN SERPL-MCNC: 47 MG/DL (ref 7–22)
CALCIUM SERPL-MCNC: 8.8 MG/DL (ref 8.5–10.5)
CASTS #/AREA URNS LPF: ABNORMAL /LPF
CASTS 2: ABNORMAL /LPF
CHARACTER UR: CLEAR
CHLORIDE SERPL-SCNC: 106 MEQ/L (ref 98–111)
CO2 SERPL-SCNC: 23 MEQ/L (ref 23–33)
COLOR: YELLOW
CREAT SERPL-MCNC: 3 MG/DL (ref 0.4–1.2)
CRYSTALS URNS MICRO: ABNORMAL
DEPRECATED RDW RBC AUTO: 50.4 FL (ref 35–45)
EOSINOPHIL NFR BLD AUTO: 0.8 %
EOSINOPHILS ABSOLUTE: 0 THOU/MM3 (ref 0–0.4)
EPITHELIAL CELLS, UA: ABNORMAL /HPF
ERYTHROCYTE [DISTWIDTH] IN BLOOD BY AUTOMATED COUNT: 14.6 % (ref 11.5–14.5)
GFR SERPL CREATININE-BSD FRML MDRD: 18 ML/MIN/1.73M2
GLUCOSE SERPL-MCNC: 90 MG/DL (ref 70–108)
GLUCOSE UR QL STRIP.AUTO: NEGATIVE MG/DL
HCT VFR BLD AUTO: 35.8 % (ref 37–47)
HGB BLD-MCNC: 10.8 GM/DL (ref 12–16)
HGB UR QL STRIP.AUTO: NEGATIVE
IMM GRANULOCYTES # BLD AUTO: 0.01 THOU/MM3 (ref 0–0.07)
IMM GRANULOCYTES NFR BLD AUTO: 0.3 %
KETONES UR QL STRIP.AUTO: NEGATIVE
LIPASE SERPL-CCNC: 85.6 U/L (ref 5.6–51.3)
LYMPHOCYTES ABSOLUTE: 0.8 THOU/MM3 (ref 1–4.8)
LYMPHOCYTES NFR BLD AUTO: 19.8 %
MCH RBC QN AUTO: 28.8 PG (ref 26–33)
MCHC RBC AUTO-ENTMCNC: 30.2 GM/DL (ref 32.2–35.5)
MCV RBC AUTO: 95.5 FL (ref 81–99)
MISCELLANEOUS 2: ABNORMAL
MONOCYTES ABSOLUTE: 0.5 THOU/MM3 (ref 0.4–1.3)
MONOCYTES NFR BLD AUTO: 12.5 %
NEUTROPHILS NFR BLD AUTO: 65.8 %
NITRITE UR QL STRIP: NEGATIVE
NRBC BLD AUTO-RTO: 0 /100 WBC
OSMOLALITY SERPL CALC.SUM OF ELEC: 291.2 MOSMOL/KG (ref 275–300)
PH UR STRIP.AUTO: 6.5 [PH] (ref 5–9)
PLATELET # BLD AUTO: 183 THOU/MM3 (ref 130–400)
PMV BLD AUTO: 11.2 FL (ref 9.4–12.4)
POTASSIUM SERPL-SCNC: 4.8 MEQ/L (ref 3.5–5.2)
PROT SERPL-MCNC: 6.2 G/DL (ref 6.1–8)
PROT UR STRIP.AUTO-MCNC: 100 MG/DL
RBC # BLD AUTO: 3.75 MILL/MM3 (ref 4.2–5.4)
RBC URINE: ABNORMAL /HPF
RENAL EPI CELLS #/AREA URNS HPF: ABNORMAL /[HPF]
SEGMENTED NEUTROPHILS ABSOLUTE COUNT: 2.6 THOU/MM3 (ref 1.8–7.7)
SODIUM SERPL-SCNC: 140 MEQ/L (ref 135–145)
SP GR UR REFRACT.AUTO: 1.01 (ref 1–1.03)
UROBILINOGEN, URINE: 0.2 EU/DL (ref 0–1)
WBC # BLD AUTO: 3.9 THOU/MM3 (ref 4.8–10.8)
WBC #/AREA URNS HPF: ABNORMAL /HPF
WBC #/AREA URNS HPF: NEGATIVE /[HPF]
YEAST LIKE FUNGI URNS QL MICRO: ABNORMAL

## 2023-09-11 PROCEDURE — 99284 EMERGENCY DEPT VISIT MOD MDM: CPT

## 2023-09-11 PROCEDURE — 2580000003 HC RX 258

## 2023-09-11 PROCEDURE — 6360000002 HC RX W HCPCS

## 2023-09-11 PROCEDURE — 74176 CT ABD & PELVIS W/O CONTRAST: CPT

## 2023-09-11 PROCEDURE — 83690 ASSAY OF LIPASE: CPT

## 2023-09-11 PROCEDURE — 36415 COLL VENOUS BLD VENIPUNCTURE: CPT

## 2023-09-11 PROCEDURE — 6370000000 HC RX 637 (ALT 250 FOR IP)

## 2023-09-11 PROCEDURE — 80053 COMPREHEN METABOLIC PANEL: CPT

## 2023-09-11 PROCEDURE — 81001 URINALYSIS AUTO W/SCOPE: CPT

## 2023-09-11 PROCEDURE — 96374 THER/PROPH/DIAG INJ IV PUSH: CPT

## 2023-09-11 PROCEDURE — 85025 COMPLETE CBC W/AUTO DIFF WBC: CPT

## 2023-09-11 RX ORDER — 0.9 % SODIUM CHLORIDE 0.9 %
1000 INTRAVENOUS SOLUTION INTRAVENOUS ONCE
Status: COMPLETED | OUTPATIENT
Start: 2023-09-11 | End: 2023-09-11

## 2023-09-11 RX ORDER — MORPHINE SULFATE 2 MG/ML
2 INJECTION, SOLUTION INTRAMUSCULAR; INTRAVENOUS ONCE
Status: COMPLETED | OUTPATIENT
Start: 2023-09-11 | End: 2023-09-11

## 2023-09-11 RX ORDER — ONDANSETRON 4 MG/1
4 TABLET, ORALLY DISINTEGRATING ORAL ONCE
Status: COMPLETED | OUTPATIENT
Start: 2023-09-11 | End: 2023-09-11

## 2023-09-11 RX ADMIN — MORPHINE SULFATE 2 MG: 2 INJECTION, SOLUTION INTRAMUSCULAR; INTRAVENOUS at 16:41

## 2023-09-11 RX ADMIN — ONDANSETRON 4 MG: 4 TABLET, ORALLY DISINTEGRATING ORAL at 16:41

## 2023-09-11 RX ADMIN — SODIUM CHLORIDE 1000 ML: 9 INJECTION, SOLUTION INTRAVENOUS at 16:41

## 2023-09-11 ASSESSMENT — PAIN SCALES - GENERAL
PAINLEVEL_OUTOF10: 2
PAINLEVEL_OUTOF10: 6
PAINLEVEL_OUTOF10: 6

## 2023-09-11 ASSESSMENT — ENCOUNTER SYMPTOMS
SORE THROAT: 0
ABDOMINAL PAIN: 1
BLOOD IN STOOL: 0
NAUSEA: 1
DIARRHEA: 0
VOMITING: 0
COUGH: 0
CONSTIPATION: 0
SHORTNESS OF BREATH: 0

## 2023-09-11 ASSESSMENT — PAIN - FUNCTIONAL ASSESSMENT
PAIN_FUNCTIONAL_ASSESSMENT: 0-10

## 2023-09-11 ASSESSMENT — PAIN DESCRIPTION - ORIENTATION: ORIENTATION: LEFT

## 2023-09-11 ASSESSMENT — PAIN DESCRIPTION - LOCATION: LOCATION: ABDOMEN;FLANK

## 2023-09-11 NOTE — ED NOTES
Pt resting in bed. States her pain is now 2/10 and reports relief of nausea. No needs expressed at this time.  [address]  09/11/23 6015

## 2023-09-11 NOTE — DISCHARGE INSTRUCTIONS
Okay to take Zofran at home for nausea and tylenol for headache. Follow up with PCP if pain persists or worsens.

## 2023-09-11 NOTE — ED NOTES
Pt updated on POC. Pt medicated per MAR. No needs expressed at this time. Respirations even and unlabored, call light within reach.  [address] [place], [place]  09/11/23 6241

## 2023-09-11 NOTE — ED TRIAGE NOTES
Pt presents to the ER with c/o left-sided abdominal pain that goes to her left flank. Pt reports hx of kidney disease, diverticulitis, and kidney stones. Pt reports normal BM with some urinary frequency. Pt denies N/V/D. Pt is alert and oriented, respirations even and unlabored.  VSS

## 2023-09-11 NOTE — ED PROVIDER NOTES
[facility]          Pt Name: Ginny Thompson  MRN: [id number]  [facility] 1970  Date of evaluation: 9/11/2023  Treating Resident Physician: Kye Mai DO  Supervising Physician: Dr. Slade Body    History obtained from chart review and the patient. CHIEF COMPLAINT       Chief Complaint   Patient presents with    Abdominal Pain           HISTORY OF PRESENT ILLNESS    Patient states that she has had left back pain that radiates towards her left upper quadrant since early this morning, accompanied by nausea without vomiting. She has also had a headache since last night that was not improved with Tylenol. She also states that she has had an increase in frequency of urination over the last couple days and generally feeling unwell. She endorses chills, nausea, headache. Denies vomiting, diarrhea, changes in vision, hematuria, dysuria, urinary frequency. She has a past medical history notable for prior kidney stone, CKD stage III and follows with Dr. Roshni Rowe, and diverticulosis. Ginny Thompson is a 47 y.o. female who presents to the emergency department for evaluation of left sided flank pain and nausea. The patient has no other acute complaints at this time. REVIEW OF SYSTEMS   Review of Systems   Constitutional:  Positive for chills and fatigue. Negative for fever. HENT:  Negative for congestion and sore throat. Respiratory:  Negative for cough and shortness of breath. Cardiovascular:  Negative for chest pain and palpitations. Gastrointestinal:  Positive for abdominal pain and nausea. Negative for blood in stool, constipation, diarrhea and vomiting. Genitourinary:  Positive for flank pain and frequency. Negative for difficulty urinating, dysuria, hematuria and urgency. Neurological:  Positive for headaches. Negative for dizziness, light-headedness and numbness. Psychiatric/Behavioral: Negative.            PAST

## 2023-09-18 ENCOUNTER — HOSPITAL ENCOUNTER (OUTPATIENT)
Age: 53
Discharge: HOME | End: 2023-09-18
Payer: COMMERCIAL

## 2023-09-18 DIAGNOSIS — N18.4 CKD (CHRONIC KIDNEY DISEASE), STAGE IV (HCC): ICD-10-CM

## 2023-09-18 DIAGNOSIS — I12.9 HYPERTENSIVE RENAL DISEASE, STAGE 1 THROUGH STAGE 4 OR UNSPECIFIED CHRONIC KIDNEY DISEASE: ICD-10-CM

## 2023-09-18 LAB
ANION GAP SERPL CALC-SCNC: 11 MEQ/L (ref 8–16)
BACTERIA: ABNORMAL
BILIRUB UR QL STRIP: NEGATIVE
BUN SERPL-MCNC: 46 MG/DL (ref 7–22)
CALCIUM SERPL-MCNC: 9.2 MG/DL (ref 8.5–10.5)
CASTS #/AREA URNS LPF: ABNORMAL /LPF
CASTS #/AREA URNS LPF: ABNORMAL /LPF
CHARACTER UR: CLEAR
CHARCOAL URNS QL MICRO: ABNORMAL
CHLORIDE SERPL-SCNC: 105 MEQ/L (ref 98–111)
CO2 SERPL-SCNC: 23 MEQ/L (ref 23–33)
COLOR UR: YELLOW
CREAT SERPL-MCNC: 3.2 MG/DL (ref 0.4–1.2)
CREAT UR-MCNC: 136.1 MG/DL
CRYSTALS URNS QL MICRO: ABNORMAL
EPITHELIAL CELLS, UA: ABNORMAL /HPF
GFR SERPL CREATININE-BSD FRML MDRD: 17 ML/MIN/1.73M2
GLUCOSE SERPL-MCNC: 83 MG/DL (ref 70–108)
GLUCOSE UR QL STRIP.AUTO: NEGATIVE MG/DL
HCT VFR BLD AUTO: 37.1 % (ref 37–47)
HGB BLD-MCNC: 11.2 GM/DL (ref 12–16)
HGB UR QL STRIP.AUTO: NEGATIVE
KETONES UR QL STRIP.AUTO: NEGATIVE
LEUKOCYTE ESTERASE UR QL STRIP.AUTO: NEGATIVE
NITRITE UR QL STRIP.AUTO: NEGATIVE
PH UR STRIP.AUTO: 5.5 [PH] (ref 5–9)
PHOSPHATE SERPL-MCNC: 3.7 MG/DL (ref 2.4–4.7)
POTASSIUM SERPL-SCNC: 4.6 MEQ/L (ref 3.5–5.2)
PROT UR STRIP.AUTO-MCNC: 100 MG/DL
PROT UR-MCNC: 76.7 MG/DL
PROT/CREAT 24H UR: 0.56 MG/G{CREAT}
RBC #/AREA URNS HPF: ABNORMAL /HPF
RENAL EPI CELLS #/AREA URNS HPF: ABNORMAL /[HPF]
SODIUM SERPL-SCNC: 139 MEQ/L (ref 135–145)
SP GR UR REFRACT.AUTO: 1.02 (ref 1–1.03)
UROBILINOGEN UR QL STRIP.AUTO: 0.2 EU/DL (ref 0–1)
WBC #/AREA URNS HPF: ABNORMAL /HPF
YEAST LIKE FUNGI URNS QL MICRO: ABNORMAL

## 2023-09-18 PROCEDURE — 85014 HEMATOCRIT: CPT

## 2023-09-18 PROCEDURE — 84156 ASSAY OF PROTEIN URINE: CPT

## 2023-09-18 PROCEDURE — 82570 ASSAY OF URINE CREATININE: CPT

## 2023-09-18 PROCEDURE — 84100 ASSAY OF PHOSPHORUS: CPT

## 2023-09-18 PROCEDURE — 36415 COLL VENOUS BLD VENIPUNCTURE: CPT

## 2023-09-18 PROCEDURE — 85018 HEMOGLOBIN: CPT

## 2023-09-18 PROCEDURE — 81001 URINALYSIS AUTO W/SCOPE: CPT

## 2023-09-18 PROCEDURE — 80048 BASIC METABOLIC PNL TOTAL CA: CPT

## 2023-09-22 ENCOUNTER — OFFICE VISIT (OUTPATIENT)
Dept: NEPHROLOGY | Age: 53
End: 2023-09-22
Payer: COMMERCIAL

## 2023-09-22 VITALS
BODY MASS INDEX: 24.43 KG/M2 | HEART RATE: 108 BPM | WEIGHT: 156 LBS | DIASTOLIC BLOOD PRESSURE: 105 MMHG | SYSTOLIC BLOOD PRESSURE: 142 MMHG | OXYGEN SATURATION: 100 %

## 2023-09-22 DIAGNOSIS — I12.9 HYPERTENSIVE RENAL DISEASE, STAGE 1 THROUGH STAGE 4 OR UNSPECIFIED CHRONIC KIDNEY DISEASE: ICD-10-CM

## 2023-09-22 DIAGNOSIS — N18.4 CKD (CHRONIC KIDNEY DISEASE), STAGE IV (HCC): Primary | ICD-10-CM

## 2023-09-22 DIAGNOSIS — N25.81 SECONDARY HYPERPARATHYROIDISM (HCC): ICD-10-CM

## 2023-09-22 DIAGNOSIS — N28.1 RENAL CYST: ICD-10-CM

## 2023-09-22 PROCEDURE — G8420 CALC BMI NORM PARAMETERS: HCPCS | Performed by: INTERNAL MEDICINE

## 2023-09-22 PROCEDURE — 4004F PT TOBACCO SCREEN RCVD TLK: CPT | Performed by: INTERNAL MEDICINE

## 2023-09-22 PROCEDURE — 3078F DIAST BP <80 MM HG: CPT | Performed by: INTERNAL MEDICINE

## 2023-09-22 PROCEDURE — G8427 DOCREV CUR MEDS BY ELIG CLIN: HCPCS | Performed by: INTERNAL MEDICINE

## 2023-09-22 PROCEDURE — 3074F SYST BP LT 130 MM HG: CPT | Performed by: INTERNAL MEDICINE

## 2023-09-22 PROCEDURE — 99214 OFFICE O/P EST MOD 30 MIN: CPT | Performed by: INTERNAL MEDICINE

## 2023-09-22 PROCEDURE — 3017F COLORECTAL CA SCREEN DOC REV: CPT | Performed by: INTERNAL MEDICINE

## 2023-09-22 RX ORDER — CARVEDILOL 3.12 MG/1
3.12 TABLET ORAL 2 TIMES DAILY
Qty: 60 TABLET | Refills: 3 | Status: SHIPPED | OUTPATIENT
Start: 2023-09-22

## 2023-09-22 NOTE — PROGRESS NOTES
[facility]     [address] [facility] [address] 150   [facility], [address] [place] [address],[address]  [phone]  Progress Note  2023       Pt Name:    Daryl Bautista  YOB: 1970  Primary Care Physician:  KORIN Pacheco - CNP     Chief Complaint:   Chief Complaint   Patient presents with    Follow-up     TERESA/CKD, HTN        Background Information/Interval History:   The patient is a 47 y.o. AAF with hx HTN, ovarian cyst who is here for follow-visit. She smokes 1/2 ppd for 30+ years. She follows with her cardiologist for aortic aneurysm. She has hx kidney stones. She took ibuprofen and aleve in the past.  She is here for follow-up. Not compliant with follow-up. Was recently in hospital ER. BP runs high at times. Does not adhere to medications all the time. Hx substance use and prior drug screens have been positive. I saw her about 2 months ago. BP is little high. No complaints. No leg swelling. She is staying at rehab house to stay off illicit drugs. She says she is doing much better.       Past History:  Past Medical History:   Diagnosis Date    Aneurysm ([address]) 2017    Anxiety     Chronic kidney disease     Dental abscess 2021    right lower jaw/tooth    Eczema     all her life    GERD (gastroesophageal reflux disease)     Hypertension     Mastoiditis     Thyroid disease     having thyroid removed 13     Past Surgical History:   Procedure Laterality Date    CARDIAC SURGERY       SECTION  86 87 90    ENDOSCOPY, COLON, DIAGNOSTIC      HEEL SPUR SURGERY Right     [place] EGD TRANSORAL BIOPSY SINGLE/MULTIPLE Left 2018    EGD BIOPSY performed by Nolvia Quinn MD at [facility]    SALPINGECTOMY      tubal pregnancy    THYROIDECTOMY, PARTIAL  2011    TUBAL LIGATION  2010    TUNNELED VENOUS PORT PLACEMENT Left     mediport in chest        VITALS:  BP (!) 142/105 (Site: Left Upper Arm, Position: Sitting, Cuff Size: Medium Adult)   Pulse (!) 108   Wt 156 lb (70.8 kg)   LMP

## 2023-09-25 ENCOUNTER — APPOINTMENT (OUTPATIENT)
Dept: GENERAL RADIOLOGY | Age: 53
End: 2023-09-25
Payer: COMMERCIAL

## 2023-09-25 ENCOUNTER — HOSPITAL ENCOUNTER (EMERGENCY)
Age: 53
Discharge: HOME | End: 2023-09-25
Payer: COMMERCIAL

## 2023-09-25 VITALS
SYSTOLIC BLOOD PRESSURE: 148 MMHG | HEIGHT: 67 IN | BODY MASS INDEX: 24.48 KG/M2 | TEMPERATURE: 98.2 F | OXYGEN SATURATION: 98 % | WEIGHT: 156 LBS | HEART RATE: 87 BPM | DIASTOLIC BLOOD PRESSURE: 112 MMHG | RESPIRATION RATE: 17 BRPM

## 2023-09-25 DIAGNOSIS — R07.89 RIGHT-SIDED CHEST WALL PAIN: ICD-10-CM

## 2023-09-25 DIAGNOSIS — M62.838 TRAPEZIUS MUSCLE SPASM: ICD-10-CM

## 2023-09-25 DIAGNOSIS — S16.1XXA STRAIN OF NECK MUSCLE, INITIAL ENCOUNTER: Primary | ICD-10-CM

## 2023-09-25 PROCEDURE — 71101 X-RAY EXAM UNILAT RIBS/CHEST: CPT

## 2023-09-25 PROCEDURE — 99283 EMERGENCY DEPT VISIT LOW MDM: CPT

## 2023-09-25 PROCEDURE — 6370000000 HC RX 637 (ALT 250 FOR IP)

## 2023-09-25 RX ORDER — ACETAMINOPHEN 325 MG/1
650 TABLET ORAL ONCE
Status: COMPLETED | OUTPATIENT
Start: 2023-09-25 | End: 2023-09-25

## 2023-09-25 RX ORDER — CYCLOBENZAPRINE HCL 10 MG
10 TABLET ORAL 3 TIMES DAILY PRN
Qty: 30 TABLET | Refills: 0 | Status: SHIPPED | OUTPATIENT
Start: 2023-09-25 | End: 2023-10-05

## 2023-09-25 RX ORDER — CYCLOBENZAPRINE HCL 10 MG
10 TABLET ORAL ONCE
Status: COMPLETED | OUTPATIENT
Start: 2023-09-25 | End: 2023-09-25

## 2023-09-25 RX ADMIN — ACETAMINOPHEN 650 MG: 325 TABLET ORAL at 09:10

## 2023-09-25 RX ADMIN — CYCLOBENZAPRINE 10 MG: 10 TABLET, FILM COATED ORAL at 09:10

## 2023-09-25 ASSESSMENT — PAIN DESCRIPTION - LOCATION: LOCATION: NECK

## 2023-09-25 ASSESSMENT — PAIN - FUNCTIONAL ASSESSMENT: PAIN_FUNCTIONAL_ASSESSMENT: 0-10

## 2023-09-25 ASSESSMENT — PAIN SCALES - GENERAL: PAINLEVEL_OUTOF10: 6

## 2023-09-25 NOTE — ED TRIAGE NOTES
Presents to ED with complaints of right sided neck pain that began 2 weeks ago after waking up. Pt states the pain is still ongoing and is radiating downward.

## 2023-10-02 ENCOUNTER — OFFICE VISIT (OUTPATIENT)
Dept: CARDIOLOGY CLINIC | Age: 53
End: 2023-10-02
Payer: COMMERCIAL

## 2023-10-02 VITALS
DIASTOLIC BLOOD PRESSURE: 91 MMHG | SYSTOLIC BLOOD PRESSURE: 134 MMHG | HEIGHT: 67 IN | BODY MASS INDEX: 25.33 KG/M2 | HEART RATE: 98 BPM | WEIGHT: 161.38 LBS

## 2023-10-02 DIAGNOSIS — R93.1 ABNORMAL ECHOCARDIOGRAM: ICD-10-CM

## 2023-10-02 DIAGNOSIS — R07.9 CHEST PAIN IN ADULT: Primary | ICD-10-CM

## 2023-10-02 DIAGNOSIS — R06.02 SOB (SHORTNESS OF BREATH): ICD-10-CM

## 2023-10-02 DIAGNOSIS — R42 DIZZINESS: ICD-10-CM

## 2023-10-02 PROCEDURE — G8427 DOCREV CUR MEDS BY ELIG CLIN: HCPCS | Performed by: INTERNAL MEDICINE

## 2023-10-02 PROCEDURE — 4004F PT TOBACCO SCREEN RCVD TLK: CPT | Performed by: INTERNAL MEDICINE

## 2023-10-02 PROCEDURE — 99214 OFFICE O/P EST MOD 30 MIN: CPT | Performed by: INTERNAL MEDICINE

## 2023-10-02 PROCEDURE — 3017F COLORECTAL CA SCREEN DOC REV: CPT | Performed by: INTERNAL MEDICINE

## 2023-10-02 PROCEDURE — 3075F SYST BP GE 130 - 139MM HG: CPT | Performed by: INTERNAL MEDICINE

## 2023-10-02 PROCEDURE — G8484 FLU IMMUNIZE NO ADMIN: HCPCS | Performed by: INTERNAL MEDICINE

## 2023-10-02 PROCEDURE — 3080F DIAST BP >= 90 MM HG: CPT | Performed by: INTERNAL MEDICINE

## 2023-10-02 PROCEDURE — 93000 ELECTROCARDIOGRAM COMPLETE: CPT | Performed by: INTERNAL MEDICINE

## 2023-10-02 PROCEDURE — G8419 CALC BMI OUT NRM PARAM NOF/U: HCPCS | Performed by: INTERNAL MEDICINE

## 2023-10-02 NOTE — PROGRESS NOTES
[address] CARDIOLOGY  [facility]  [address] [facility] [id number]  Dept: [phone]  Dept Fax: [phone]  Loc: [phone]    Visit Date: 10/2/2023    Ms. Davion Burris is a 47 y.o. female  who presented for:  Chief Complaint   Patient presents with    New Patient       HPI:   48 yo F c hx HTN, CKD 3.2, Current smoker, and Depression is here for a followup. Denies any chest pain, sob, palpitations, lightheadedness, dizziness, orthopnea, PND or pedal edema. Current Outpatient Medications:     cyclobenzaprine (FLEXERIL) 10 MG tablet, Take 1 tablet by mouth 3 times daily as needed for Muscle spasms, Disp: 30 tablet, Rfl: 0    carvedilol (COREG) 3.125 MG tablet, Take 1 tablet by mouth 2 times daily, Disp: 60 tablet, Rfl: 3    ondansetron (ZOFRAN) 4 MG tablet, Take 1 tablet by mouth every 8 hours as needed for Nausea or Vomiting, Disp: 15 tablet, Rfl: 0    hydrALAZINE (APRESOLINE) 50 MG tablet, Take 1 tablet by mouth 3 times daily, Disp: 90 tablet, Rfl: 3    amLODIPine (NORVASC) 10 MG tablet, Take 1 tablet by mouth daily, Disp: 30 tablet, Rfl: 3    acetaminophen (TYLENOL) 500 MG tablet, Take 2 tablets by mouth every 6 hours as needed for Pain, Disp: , Rfl:     Blood Pressure Monitoring KIT, 1 Units by Does not apply route 2 times daily (Patient not taking: Reported on 7/20/2023), Disp: 1 kit, Rfl: 0    Past Medical History  Shasha Aguilar  has a past medical history of Aneurysm ([address]), Anxiety, Chronic kidney disease, Dental abscess, Eczema, GERD (gastroesophageal reflux disease), Hypertension, Mastoiditis, and Thyroid disease. Social History  Asha MAXWELL  reports that she has been smoking cigarettes. She has a 16.50 pack-year smoking history. She has never used smokeless tobacco. She reports current alcohol use. She reports current drug use. Drug: Marijuana Jaczelalemnette Pun).     Family History  Shasha Aguilar family history includes Cancer in her maternal grandfather and mother;

## 2023-10-02 NOTE — PROGRESS NOTES
New patient here for check up     EKG done today     Pt c/o chest pain, right sided, rates pain 4/10, constant pain, sob, swelling in legs and feet , dizziness     Pt denies heart palpitations

## 2023-10-19 ENCOUNTER — HOSPITAL ENCOUNTER (OUTPATIENT)
Age: 53
Setting detail: SPECIMEN
Discharge: HOME | End: 2023-10-19

## 2023-10-24 LAB
HPV I/H RISK 4 DNA CVX QL NAA+PROBE: NOT DETECTED
HPV SAMPLE: NORMAL
HPV, INTERPRETATION: NORMAL
HPV16 DNA CVX QL NAA+PROBE: NOT DETECTED
HPV18 DNA CVX QL NAA+PROBE: NOT DETECTED
SPECIMEN DESCRIPTION: NORMAL

## 2023-11-02 ENCOUNTER — HOSPITAL ENCOUNTER (OUTPATIENT)
Dept: WOMENS IMAGING | Age: 53
Discharge: HOME | End: 2023-11-02
Payer: COMMERCIAL

## 2023-11-02 ENCOUNTER — APPOINTMENT (OUTPATIENT)
Dept: WOMENS IMAGING | Age: 53
End: 2023-11-02
Payer: COMMERCIAL

## 2023-11-02 DIAGNOSIS — N64.52 NIPPLE DISCHARGE: ICD-10-CM

## 2023-11-02 LAB — CYTOLOGY REPORT: NORMAL

## 2023-11-02 PROCEDURE — G0279 TOMOSYNTHESIS, MAMMO: HCPCS

## 2023-11-08 ENCOUNTER — HOSPITAL ENCOUNTER (OUTPATIENT)
Age: 53
Discharge: HOME | End: 2023-11-10
Attending: INTERNAL MEDICINE
Payer: COMMERCIAL

## 2023-11-08 VITALS
HEIGHT: 67 IN | DIASTOLIC BLOOD PRESSURE: 91 MMHG | SYSTOLIC BLOOD PRESSURE: 134 MMHG | BODY MASS INDEX: 25.27 KG/M2 | WEIGHT: 161 LBS

## 2023-11-08 DIAGNOSIS — R07.9 CHEST PAIN IN ADULT: ICD-10-CM

## 2023-11-08 DIAGNOSIS — R93.1 ABNORMAL ECHOCARDIOGRAM: ICD-10-CM

## 2023-11-08 DIAGNOSIS — R06.02 SOB (SHORTNESS OF BREATH): ICD-10-CM

## 2023-11-08 DIAGNOSIS — R42 DIZZINESS: ICD-10-CM

## 2023-11-08 LAB
ECHO AO ASC DIAM: 4.2 CM
ECHO AO ASCENDING AORTA INDEX: 2.28 CM/M2
ECHO AR MAX VEL PISA: 3.7 M/S
ECHO AV CUSP MM: 1.9 CM
ECHO AV PEAK GRADIENT: 14 MMHG
ECHO AV PEAK VELOCITY: 1.9 M/S
ECHO AV REGURGITANT PHT: 873 MS
ECHO AV VELOCITY RATIO: 0.68
ECHO BSA: 1.86 M2
ECHO EST RA PRESSURE: 5 MMHG
ECHO LA AREA 2C: 12.4 CM2
ECHO LA AREA 4C: 11.6 CM2
ECHO LA DIAMETER INDEX: 1.79 CM/M2
ECHO LA DIAMETER: 3.3 CM
ECHO LA MAJOR AXIS: 4.6 CM
ECHO LA MINOR AXIS: 4.7 CM
ECHO LA VOL A-L A2C: 27 ML (ref 22–52)
ECHO LA VOL A-L A4C: 24 ML (ref 22–52)
ECHO LA VOL BP: 25 ML (ref 22–52)
ECHO LA VOL/BSA BIPLANE: 14 ML/M2 (ref 16–34)
ECHO LA VOLUME INDEX A-L A2C: 15 ML/M2 (ref 16–34)
ECHO LA VOLUME INDEX A-L A4C: 13 ML/M2 (ref 16–34)
ECHO LV FRACTIONAL SHORTENING: 29 % (ref 28–44)
ECHO LV INTERNAL DIMENSION DIASTOLE INDEX: 2.07 CM/M2
ECHO LV INTERNAL DIMENSION DIASTOLIC: 3.8 CM (ref 3.9–5.3)
ECHO LV INTERNAL DIMENSION SYSTOLIC INDEX: 1.47 CM/M2
ECHO LV INTERNAL DIMENSION SYSTOLIC: 2.7 CM
ECHO LV ISOVOLUMETRIC RELAXATION TIME (IVRT): 130 MS
ECHO LV IVSD: 1.8 CM (ref 0.6–0.9)
ECHO LV MASS 2D: 349.8 G (ref 67–162)
ECHO LV MASS INDEX 2D: 190.1 G/M2 (ref 43–95)
ECHO LV POSTERIOR WALL DIASTOLIC: 2.2 CM (ref 0.6–0.9)
ECHO LV RELATIVE WALL THICKNESS RATIO: 1.16
ECHO LVOT PEAK GRADIENT: 6 MMHG
ECHO LVOT PEAK VELOCITY: 1.3 M/S
ECHO MV REGURGITANT PEAK GRADIENT: 74 MMHG
ECHO MV REGURGITANT PEAK VELOCITY: 4.3 M/S
ECHO PV MAX VELOCITY: 1.1 M/S
ECHO PV PEAK GRADIENT: 5 MMHG
ECHO RIGHT VENTRICULAR SYSTOLIC PRESSURE (RVSP): 46 MMHG
ECHO RV INTERNAL DIMENSION: 3.3 CM
ECHO RV TAPSE: 1.8 CM (ref 1.7–?)
ECHO TV E WAVE: 0.6 M/S
ECHO TV REGURGITANT MAX VELOCITY: 3.19 M/S
ECHO TV REGURGITANT PEAK GRADIENT: 41 MMHG

## 2023-11-08 PROCEDURE — 93306 TTE W/DOPPLER COMPLETE: CPT

## 2023-11-08 PROCEDURE — 93306 TTE W/DOPPLER COMPLETE: CPT | Performed by: INTERNAL MEDICINE

## 2023-11-20 ENCOUNTER — HOSPITAL ENCOUNTER (OUTPATIENT)
Age: 53
Setting detail: OBSERVATION
Discharge: HOME | End: 2023-11-22
Attending: NURSE PRACTITIONER
Payer: COMMERCIAL

## 2023-11-20 ENCOUNTER — APPOINTMENT (OUTPATIENT)
Dept: CT IMAGING | Age: 53
End: 2023-11-20
Payer: COMMERCIAL

## 2023-11-20 DIAGNOSIS — K57.32 DIVERTICULITIS OF COLON: Primary | ICD-10-CM

## 2023-11-20 PROBLEM — K57.92 ACUTE DIVERTICULITIS: Status: ACTIVE | Noted: 2023-11-20

## 2023-11-20 LAB
ALBUMIN SERPL BCG-MCNC: 3.9 G/DL (ref 3.5–5.1)
ALP SERPL-CCNC: 66 U/L (ref 38–126)
ALT SERPL W/O P-5'-P-CCNC: 7 U/L (ref 11–66)
ANION GAP SERPL CALC-SCNC: 11 MEQ/L (ref 8–16)
AST SERPL-CCNC: 12 U/L (ref 5–40)
BASOPHILS ABSOLUTE: 0 THOU/MM3 (ref 0–0.1)
BASOPHILS NFR BLD AUTO: 0.4 %
BILIRUB SERPL-MCNC: 0.5 MG/DL (ref 0.3–1.2)
BUN SERPL-MCNC: 49 MG/DL (ref 7–22)
CALCIUM SERPL-MCNC: 9 MG/DL (ref 8.5–10.5)
CHLORIDE SERPL-SCNC: 103 MEQ/L (ref 98–111)
CO2 SERPL-SCNC: 23 MEQ/L (ref 23–33)
CREAT SERPL-MCNC: 3.6 MG/DL (ref 0.4–1.2)
DEPRECATED RDW RBC AUTO: 47.1 FL (ref 35–45)
EOSINOPHIL NFR BLD AUTO: 0.6 %
EOSINOPHILS ABSOLUTE: 0 THOU/MM3 (ref 0–0.4)
ERYTHROCYTE [DISTWIDTH] IN BLOOD BY AUTOMATED COUNT: 14.2 % (ref 11.5–14.5)
FLUAV RNA RESP QL NAA+PROBE: NOT DETECTED
FLUBV RNA RESP QL NAA+PROBE: NOT DETECTED
GFR SERPL CREATININE-BSD FRML MDRD: 15 ML/MIN/1.73M2
GLUCOSE SERPL-MCNC: 110 MG/DL (ref 70–108)
HCT VFR BLD AUTO: 33.9 % (ref 37–47)
HGB BLD-MCNC: 10.5 GM/DL (ref 12–16)
IMM GRANULOCYTES # BLD AUTO: 0.02 THOU/MM3 (ref 0–0.07)
IMM GRANULOCYTES NFR BLD AUTO: 0.3 %
LACTIC ACID, SEPSIS: 0.5 MMOL/L (ref 0.5–1.9)
LACTIC ACID, SEPSIS: 0.9 MMOL/L (ref 0.5–1.9)
LIPASE SERPL-CCNC: 62.9 U/L (ref 5.6–51.3)
LYMPHOCYTES ABSOLUTE: 1 THOU/MM3 (ref 1–4.8)
LYMPHOCYTES NFR BLD AUTO: 15.4 %
MCH RBC QN AUTO: 28.2 PG (ref 26–33)
MCHC RBC AUTO-ENTMCNC: 31 GM/DL (ref 32.2–35.5)
MCV RBC AUTO: 90.9 FL (ref 81–99)
MONOCYTES ABSOLUTE: 0.7 THOU/MM3 (ref 0.4–1.3)
MONOCYTES NFR BLD AUTO: 10.7 %
NEUTROPHILS NFR BLD AUTO: 72.6 %
NRBC BLD AUTO-RTO: 0 /100 WBC
OSMOLALITY SERPL CALC.SUM OF ELEC: 287.4 MOSMOL/KG (ref 275–300)
PLATELET # BLD AUTO: 182 THOU/MM3 (ref 130–400)
PMV BLD AUTO: 11.2 FL (ref 9.4–12.4)
POTASSIUM SERPL-SCNC: 4.8 MEQ/L (ref 3.5–5.2)
PROT SERPL-MCNC: 7 G/DL (ref 6.1–8)
RBC # BLD AUTO: 3.73 MILL/MM3 (ref 4.2–5.4)
SARS-COV-2 RNA RESP QL NAA+PROBE: NOT DETECTED
SEGMENTED NEUTROPHILS ABSOLUTE COUNT: 4.9 THOU/MM3 (ref 1.8–7.7)
SODIUM SERPL-SCNC: 137 MEQ/L (ref 135–145)
TROPONIN, HIGH SENSITIVITY: 12 NG/L (ref 0–12)
WBC # BLD AUTO: 6.7 THOU/MM3 (ref 4.8–10.8)

## 2023-11-20 PROCEDURE — 96365 THER/PROPH/DIAG IV INF INIT: CPT

## 2023-11-20 PROCEDURE — 99285 EMERGENCY DEPT VISIT HI MDM: CPT

## 2023-11-20 PROCEDURE — 96367 TX/PROPH/DG ADDL SEQ IV INF: CPT

## 2023-11-20 PROCEDURE — 36415 COLL VENOUS BLD VENIPUNCTURE: CPT

## 2023-11-20 PROCEDURE — 87636 SARSCOV2 & INF A&B AMP PRB: CPT

## 2023-11-20 PROCEDURE — 85025 COMPLETE CBC W/AUTO DIFF WBC: CPT

## 2023-11-20 PROCEDURE — 6360000002 HC RX W HCPCS

## 2023-11-20 PROCEDURE — 84484 ASSAY OF TROPONIN QUANT: CPT

## 2023-11-20 PROCEDURE — 96361 HYDRATE IV INFUSION ADD-ON: CPT

## 2023-11-20 PROCEDURE — 80053 COMPREHEN METABOLIC PANEL: CPT

## 2023-11-20 PROCEDURE — 96375 TX/PRO/DX INJ NEW DRUG ADDON: CPT

## 2023-11-20 PROCEDURE — 96372 THER/PROPH/DIAG INJ SC/IM: CPT

## 2023-11-20 PROCEDURE — 74176 CT ABD & PELVIS W/O CONTRAST: CPT

## 2023-11-20 PROCEDURE — 6360000002 HC RX W HCPCS: Performed by: NURSE PRACTITIONER

## 2023-11-20 PROCEDURE — 2580000003 HC RX 258

## 2023-11-20 PROCEDURE — 83690 ASSAY OF LIPASE: CPT

## 2023-11-20 PROCEDURE — 96376 TX/PRO/DX INJ SAME DRUG ADON: CPT

## 2023-11-20 PROCEDURE — 2580000003 HC RX 258: Performed by: NURSE PRACTITIONER

## 2023-11-20 PROCEDURE — 6370000000 HC RX 637 (ALT 250 FOR IP): Performed by: NURSE PRACTITIONER

## 2023-11-20 PROCEDURE — G0378 HOSPITAL OBSERVATION PER HR: HCPCS

## 2023-11-20 PROCEDURE — 83605 ASSAY OF LACTIC ACID: CPT

## 2023-11-20 PROCEDURE — 99223 1ST HOSP IP/OBS HIGH 75: CPT | Performed by: NURSE PRACTITIONER

## 2023-11-20 RX ORDER — METRONIDAZOLE 500 MG/100ML
500 INJECTION, SOLUTION INTRAVENOUS EVERY 8 HOURS
Status: DISCONTINUED | OUTPATIENT
Start: 2023-11-21 | End: 2023-11-22 | Stop reason: HOSPADM

## 2023-11-20 RX ORDER — SODIUM CHLORIDE 9 MG/ML
INJECTION, SOLUTION INTRAVENOUS CONTINUOUS
Status: DISCONTINUED | OUTPATIENT
Start: 2023-11-20 | End: 2023-11-22 | Stop reason: HOSPADM

## 2023-11-20 RX ORDER — AMLODIPINE BESYLATE 10 MG/1
10 TABLET ORAL DAILY
Qty: 90 TABLET | Refills: 3 | Status: SHIPPED | OUTPATIENT
Start: 2023-11-20

## 2023-11-20 RX ORDER — MORPHINE SULFATE 4 MG/ML
4 INJECTION, SOLUTION INTRAMUSCULAR; INTRAVENOUS ONCE
Status: COMPLETED | OUTPATIENT
Start: 2023-11-20 | End: 2023-11-20

## 2023-11-20 RX ORDER — 0.9 % SODIUM CHLORIDE 0.9 %
1000 INTRAVENOUS SOLUTION INTRAVENOUS ONCE
Status: COMPLETED | OUTPATIENT
Start: 2023-11-20 | End: 2023-11-20

## 2023-11-20 RX ORDER — SODIUM CHLORIDE 9 MG/ML
INJECTION, SOLUTION INTRAVENOUS PRN
Status: DISCONTINUED | OUTPATIENT
Start: 2023-11-20 | End: 2023-11-22 | Stop reason: HOSPADM

## 2023-11-20 RX ORDER — POLYETHYLENE GLYCOL 3350 17 G/17G
17 POWDER, FOR SOLUTION ORAL DAILY PRN
Status: DISCONTINUED | OUTPATIENT
Start: 2023-11-20 | End: 2023-11-22 | Stop reason: HOSPADM

## 2023-11-20 RX ORDER — ACETAMINOPHEN 650 MG/1
650 SUPPOSITORY RECTAL EVERY 6 HOURS PRN
Status: DISCONTINUED | OUTPATIENT
Start: 2023-11-20 | End: 2023-11-22 | Stop reason: HOSPADM

## 2023-11-20 RX ORDER — MORPHINE SULFATE 2 MG/ML
2 INJECTION, SOLUTION INTRAMUSCULAR; INTRAVENOUS EVERY 4 HOURS PRN
Status: DISCONTINUED | OUTPATIENT
Start: 2023-11-20 | End: 2023-11-22 | Stop reason: HOSPADM

## 2023-11-20 RX ORDER — AMLODIPINE BESYLATE 10 MG/1
10 TABLET ORAL DAILY
Status: DISCONTINUED | OUTPATIENT
Start: 2023-11-21 | End: 2023-11-22 | Stop reason: HOSPADM

## 2023-11-20 RX ORDER — SODIUM CHLORIDE 0.9 % (FLUSH) 0.9 %
5-40 SYRINGE (ML) INJECTION PRN
Status: DISCONTINUED | OUTPATIENT
Start: 2023-11-20 | End: 2023-11-22 | Stop reason: HOSPADM

## 2023-11-20 RX ORDER — ONDANSETRON 4 MG/1
4 TABLET, ORALLY DISINTEGRATING ORAL EVERY 8 HOURS PRN
Status: DISCONTINUED | OUTPATIENT
Start: 2023-11-20 | End: 2023-11-22 | Stop reason: HOSPADM

## 2023-11-20 RX ORDER — ONDANSETRON 2 MG/ML
4 INJECTION INTRAMUSCULAR; INTRAVENOUS EVERY 6 HOURS PRN
Status: DISCONTINUED | OUTPATIENT
Start: 2023-11-20 | End: 2023-11-22 | Stop reason: HOSPADM

## 2023-11-20 RX ORDER — SODIUM CHLORIDE 0.9 % (FLUSH) 0.9 %
5-40 SYRINGE (ML) INJECTION EVERY 12 HOURS SCHEDULED
Status: DISCONTINUED | OUTPATIENT
Start: 2023-11-20 | End: 2023-11-22 | Stop reason: HOSPADM

## 2023-11-20 RX ORDER — ENOXAPARIN SODIUM 100 MG/ML
30 INJECTION SUBCUTANEOUS DAILY
Status: DISCONTINUED | OUTPATIENT
Start: 2023-11-20 | End: 2023-11-22 | Stop reason: HOSPADM

## 2023-11-20 RX ORDER — CARVEDILOL 3.12 MG/1
3.12 TABLET ORAL 2 TIMES DAILY
Status: DISCONTINUED | OUTPATIENT
Start: 2023-11-20 | End: 2023-11-22 | Stop reason: HOSPADM

## 2023-11-20 RX ORDER — ACETAMINOPHEN 325 MG/1
650 TABLET ORAL EVERY 6 HOURS PRN
Status: DISCONTINUED | OUTPATIENT
Start: 2023-11-20 | End: 2023-11-22 | Stop reason: HOSPADM

## 2023-11-20 RX ORDER — HYDRALAZINE HYDROCHLORIDE 50 MG/1
50 TABLET, FILM COATED ORAL 3 TIMES DAILY
Status: DISCONTINUED | OUTPATIENT
Start: 2023-11-20 | End: 2023-11-22 | Stop reason: HOSPADM

## 2023-11-20 RX ORDER — HYDRALAZINE HYDROCHLORIDE 50 MG/1
50 TABLET, FILM COATED ORAL 3 TIMES DAILY
Qty: 90 TABLET | Refills: 3 | Status: SHIPPED | OUTPATIENT
Start: 2023-11-20

## 2023-11-20 RX ORDER — METRONIDAZOLE 500 MG/100ML
500 INJECTION, SOLUTION INTRAVENOUS ONCE
Status: COMPLETED | OUTPATIENT
Start: 2023-11-20 | End: 2023-11-20

## 2023-11-20 RX ADMIN — ENOXAPARIN SODIUM 30 MG: 100 INJECTION SUBCUTANEOUS at 20:34

## 2023-11-20 RX ADMIN — METRONIDAZOLE 500 MG: 500 INJECTION, SOLUTION INTRAVENOUS at 16:41

## 2023-11-20 RX ADMIN — MORPHINE SULFATE 2 MG: 2 INJECTION, SOLUTION INTRAMUSCULAR; INTRAVENOUS at 20:34

## 2023-11-20 RX ADMIN — CEFTRIAXONE SODIUM 1000 MG: 1 INJECTION, POWDER, FOR SOLUTION INTRAMUSCULAR; INTRAVENOUS at 16:10

## 2023-11-20 RX ADMIN — MORPHINE SULFATE 4 MG: 4 INJECTION, SOLUTION INTRAMUSCULAR; INTRAVENOUS at 15:26

## 2023-11-20 RX ADMIN — SODIUM CHLORIDE: 9 INJECTION, SOLUTION INTRAVENOUS at 17:44

## 2023-11-20 RX ADMIN — CARVEDILOL 3.12 MG: 3.12 TABLET, FILM COATED ORAL at 20:33

## 2023-11-20 RX ADMIN — SODIUM CHLORIDE 1000 ML: 9 INJECTION, SOLUTION INTRAVENOUS at 15:26

## 2023-11-20 RX ADMIN — HYDRALAZINE HYDROCHLORIDE 50 MG: 50 TABLET, FILM COATED ORAL at 17:40

## 2023-11-20 ASSESSMENT — PAIN SCALES - GENERAL
PAINLEVEL_OUTOF10: 5
PAINLEVEL_OUTOF10: 7
PAINLEVEL_OUTOF10: 0
PAINLEVEL_OUTOF10: 0

## 2023-11-20 ASSESSMENT — PAIN DESCRIPTION - PAIN TYPE: TYPE: ACUTE PAIN

## 2023-11-20 ASSESSMENT — PAIN DESCRIPTION - LOCATION
LOCATION: ABDOMEN;GENERALIZED;HEAD
LOCATION: ABDOMEN

## 2023-11-20 ASSESSMENT — PAIN DESCRIPTION - DESCRIPTORS
DESCRIPTORS: ACHING
DESCRIPTORS: ACHING

## 2023-11-20 ASSESSMENT — PAIN DESCRIPTION - ONSET: ONSET: ON-GOING

## 2023-11-20 ASSESSMENT — PAIN DESCRIPTION - ORIENTATION: ORIENTATION: LEFT

## 2023-11-20 ASSESSMENT — PAIN - FUNCTIONAL ASSESSMENT: PAIN_FUNCTIONAL_ASSESSMENT: PREVENTS OR INTERFERES SOME ACTIVE ACTIVITIES AND ADLS

## 2023-11-20 ASSESSMENT — PAIN DESCRIPTION - FREQUENCY: FREQUENCY: CONTINUOUS

## 2023-11-20 NOTE — ED TRIAGE NOTES
Patient to ER due to headache, abd pain, back pain and body ache that started yesterday. Patient has been around others with similar symptoms. Patient has only taken blood pressure medications at home.

## 2023-11-20 NOTE — ED NOTES
Assumed pt care. Pt resting in bed. Resp regular. Denies needs. Call light in reach.       Sarah Garcia RN  11/20/23 4001

## 2023-11-20 NOTE — ED PROVIDER NOTES
emergency I16.1    Non compliance w medication regimen Z91.148    Acute diverticulitis K57.92     SURGICAL HISTORY       Past Surgical History:   Procedure Laterality Date    CARDIAC SURGERY       SECTION  86 87 90    ENDOSCOPY, COLON, DIAGNOSTIC      HEEL SPUR SURGERY Right     [place] EGD TRANSORAL BIOPSY SINGLE/MULTIPLE Left 2018    EGD BIOPSY performed by Jan Flanagan MD at [facility]    SALPINGECTOMY      tubal pregnancy    THYROIDECTOMY, PARTIAL  2011    TUBAL LIGATION  2010    TUNNELED VENOUS PORT PLACEMENT Left 2013    mediport in chest     CURRENT MEDICATIONS       Current Discharge Medication List        CONTINUE these medications which have NOT CHANGED    Details   carvedilol (COREG) 3.125 MG tablet Take 1 tablet by mouth 2 times daily  Qty: 60 tablet, Refills: 3      ondansetron (ZOFRAN) 4 MG tablet Take 1 tablet by mouth every 8 hours as needed for Nausea or Vomiting  Qty: 15 tablet, Refills: 0      acetaminophen (TYLENOL) 500 MG tablet Take 2 tablets by mouth every 6 hours as needed for Pain      Blood Pressure Monitoring KIT 1 Units by Does not apply route 2 times daily  Qty: 1 kit, Refills: 0           ALLERGIES     is allergic to fish allergy, fish-derived products, and fioricet [butalbital-apap-caffeine]. FAMILY HISTORY     She indicated that her mother is . She indicated that her father is . She indicated that the status of her maternal grandfather is unknown. She indicated that the status of her paternal grandfather is unknown.      SOCIAL HISTORY       Social History     Tobacco Use    Smoking status: Every Day     Packs/day: 0.25     Years: 33.00     Additional pack years: 0.00     Total pack years: 8.25     Types: Cigarettes    Smokeless tobacco: Never   Vaping Use    Vaping Use: Never used   Substance Use Topics    Alcohol use: Yes     Comment: social, occasionally    Drug use: Yes     Comment: Marijuana and cocain use four months ago     [address] [facility]       ED

## 2023-11-20 NOTE — H&P
Hospitalist History & Physical    Patient:  Albina Alvarez    Unit/Bed:16/016A  YOB: 1970  MRN: [id number]   Acct: [de-identified]   PCP: Valentino Romberg, APRN - CNP  Code Status: Prior    Date of Service: Pt seen/examined on 11/20/23 and admitted to Observation with expected LOS less than two midnights due to medical therapy. Chief Complaint: LLQ abdominal pain    Assessment/Plan:  Acute diverticulitis, without perforation or abscess  -h/o diverticulitis, follows with Dr Gustabo Caldwell  -mid and distal descending colon per CT A/P  -LLQ pain improving, no associated nausea, no diarrhea, afebrile  -ceftriaxone/flagyl initiated in ED, will continue  -clear liquids  -IV morphine as needed  -Consider GI consult if patient does not improve otherwise outpatient for follow-up colonoscopy in 6-8 weeks    Acute on CKD stage IV  -baseline sCr 3-3.2, currently 3.6  -follows with Dr Swetha Jacques  -renal protective strategies  -gentle hydration  -follow renal indices  -consider nephrology consultation in am if not improving with hydration    Other significant medical history:  HTN  -cont amlodipine, carvedilol, hydralazine  AAA  GERD    Resume home medication regimen unless contraindicated. DVT prophylaxis: Lovenox  Code status: Total support    History of Present Illness:  Albina Alvarez is a 47 y.o. female with PMHx that includes: HTN, CKD stage IV, AAA, GERD, diverticulitis who presented to [facility] with chief complaint of LLQ pain and generalized malaise. Patient is alert, oriented x 3. NAD. States she has been in her usual state of health until yesterday. After eating at a buffet, she began feeling achy/weak/tired and developed L sided abdominal pain. Worse in LLQ. States this was reminiscent of previous episode of diverticulitis hence presentation to ED for further evaluation. Denies fever/chills, N/V, diarrhea. Last BM, non bloody and formed this morning prior to arrival. Denies fever/chills.  States LLQ abdominal

## 2023-11-21 LAB
ANION GAP SERPL CALC-SCNC: 9 MEQ/L (ref 8–16)
BASOPHILS ABSOLUTE: 0 THOU/MM3 (ref 0–0.1)
BASOPHILS NFR BLD AUTO: 0.7 %
BUN SERPL-MCNC: 44 MG/DL (ref 7–22)
CALCIUM SERPL-MCNC: 9.3 MG/DL (ref 8.5–10.5)
CHLORIDE SERPL-SCNC: 106 MEQ/L (ref 98–111)
CO2 SERPL-SCNC: 25 MEQ/L (ref 23–33)
CREAT SERPL-MCNC: 3.3 MG/DL (ref 0.4–1.2)
DEPRECATED RDW RBC AUTO: 48.8 FL (ref 35–45)
EOSINOPHIL NFR BLD AUTO: 2.7 %
EOSINOPHILS ABSOLUTE: 0.1 THOU/MM3 (ref 0–0.4)
ERYTHROCYTE [DISTWIDTH] IN BLOOD BY AUTOMATED COUNT: 14.3 % (ref 11.5–14.5)
GFR SERPL CREATININE-BSD FRML MDRD: 16 ML/MIN/1.73M2
GLUCOSE SERPL-MCNC: 93 MG/DL (ref 70–108)
HCT VFR BLD AUTO: 32.8 % (ref 37–47)
HGB BLD-MCNC: 9.8 GM/DL (ref 12–16)
IMM GRANULOCYTES # BLD AUTO: 0.01 THOU/MM3 (ref 0–0.07)
IMM GRANULOCYTES NFR BLD AUTO: 0.2 %
LYMPHOCYTES ABSOLUTE: 1.1 THOU/MM3 (ref 1–4.8)
LYMPHOCYTES NFR BLD AUTO: 27.2 %
MCH RBC QN AUTO: 28.2 PG (ref 26–33)
MCHC RBC AUTO-ENTMCNC: 29.9 GM/DL (ref 32.2–35.5)
MCV RBC AUTO: 94.5 FL (ref 81–99)
MONOCYTES ABSOLUTE: 0.6 THOU/MM3 (ref 0.4–1.3)
MONOCYTES NFR BLD AUTO: 15.1 %
NEUTROPHILS NFR BLD AUTO: 54.1 %
NRBC BLD AUTO-RTO: 0 /100 WBC
PLATELET # BLD AUTO: 154 THOU/MM3 (ref 130–400)
PMV BLD AUTO: 11.5 FL (ref 9.4–12.4)
POTASSIUM SERPL-SCNC: 4.8 MEQ/L (ref 3.5–5.2)
RBC # BLD AUTO: 3.47 MILL/MM3 (ref 4.2–5.4)
SEGMENTED NEUTROPHILS ABSOLUTE COUNT: 2.2 THOU/MM3 (ref 1.8–7.7)
SODIUM SERPL-SCNC: 140 MEQ/L (ref 135–145)
WBC # BLD AUTO: 4 THOU/MM3 (ref 4.8–10.8)

## 2023-11-21 PROCEDURE — 96366 THER/PROPH/DIAG IV INF ADDON: CPT

## 2023-11-21 PROCEDURE — 6370000000 HC RX 637 (ALT 250 FOR IP): Performed by: NURSE PRACTITIONER

## 2023-11-21 PROCEDURE — G0378 HOSPITAL OBSERVATION PER HR: HCPCS

## 2023-11-21 PROCEDURE — 6360000002 HC RX W HCPCS: Performed by: NURSE PRACTITIONER

## 2023-11-21 PROCEDURE — 96372 THER/PROPH/DIAG INJ SC/IM: CPT

## 2023-11-21 PROCEDURE — 96376 TX/PRO/DX INJ SAME DRUG ADON: CPT

## 2023-11-21 PROCEDURE — 85025 COMPLETE CBC W/AUTO DIFF WBC: CPT

## 2023-11-21 PROCEDURE — 36415 COLL VENOUS BLD VENIPUNCTURE: CPT

## 2023-11-21 PROCEDURE — 96361 HYDRATE IV INFUSION ADD-ON: CPT

## 2023-11-21 PROCEDURE — 80048 BASIC METABOLIC PNL TOTAL CA: CPT

## 2023-11-21 PROCEDURE — 2580000003 HC RX 258: Performed by: NURSE PRACTITIONER

## 2023-11-21 PROCEDURE — 99232 SBSQ HOSP IP/OBS MODERATE 35: CPT | Performed by: NURSE PRACTITIONER

## 2023-11-21 RX ADMIN — ENOXAPARIN SODIUM 30 MG: 100 INJECTION SUBCUTANEOUS at 08:18

## 2023-11-21 RX ADMIN — METRONIDAZOLE 500 MG: 500 INJECTION, SOLUTION INTRAVENOUS at 08:17

## 2023-11-21 RX ADMIN — MORPHINE SULFATE 2 MG: 2 INJECTION, SOLUTION INTRAMUSCULAR; INTRAVENOUS at 09:25

## 2023-11-21 RX ADMIN — METRONIDAZOLE 500 MG: 500 INJECTION, SOLUTION INTRAVENOUS at 15:32

## 2023-11-21 RX ADMIN — CEFTRIAXONE SODIUM 1000 MG: 1 INJECTION, POWDER, FOR SOLUTION INTRAMUSCULAR; INTRAVENOUS at 16:35

## 2023-11-21 RX ADMIN — CARVEDILOL 3.12 MG: 3.12 TABLET, FILM COATED ORAL at 08:18

## 2023-11-21 RX ADMIN — SODIUM CHLORIDE: 9 INJECTION, SOLUTION INTRAVENOUS at 20:13

## 2023-11-21 RX ADMIN — AMLODIPINE BESYLATE 10 MG: 10 TABLET ORAL at 08:18

## 2023-11-21 RX ADMIN — METRONIDAZOLE 500 MG: 500 INJECTION, SOLUTION INTRAVENOUS at 00:18

## 2023-11-21 RX ADMIN — HYDRALAZINE HYDROCHLORIDE 50 MG: 50 TABLET, FILM COATED ORAL at 08:18

## 2023-11-21 ASSESSMENT — PAIN DESCRIPTION - LOCATION
LOCATION: ABDOMEN
LOCATION: ABDOMEN

## 2023-11-21 ASSESSMENT — PAIN SCALES - GENERAL
PAINLEVEL_OUTOF10: 7
PAINLEVEL_OUTOF10: 2
PAINLEVEL_OUTOF10: 0

## 2023-11-21 ASSESSMENT — PAIN DESCRIPTION - PAIN TYPE: TYPE: ACUTE PAIN

## 2023-11-21 ASSESSMENT — PAIN DESCRIPTION - ONSET: ONSET: ON-GOING

## 2023-11-21 ASSESSMENT — PAIN DESCRIPTION - FREQUENCY: FREQUENCY: INTERMITTENT

## 2023-11-21 ASSESSMENT — PAIN DESCRIPTION - ORIENTATION
ORIENTATION: RIGHT;LEFT
ORIENTATION: RIGHT;LEFT

## 2023-11-21 ASSESSMENT — PAIN DESCRIPTION - DESCRIPTORS
DESCRIPTORS: ACHING;DISCOMFORT;SHARP
DESCRIPTORS: ACHING;DISCOMFORT

## 2023-11-21 NOTE — CARE COORDINATION
Case Management Assessment  Initial Evaluation    Date/Time of Evaluation: 11/21/2023 10:38 AM  Assessment Completed by: Zo Phillips RN    If patient is discharged prior to next notation, then this note serves as note for discharge by case management. Patient Name: Yuri Maldonado                   YOB: 1970  Diagnosis: Diverticulitis of colon [K57.32]  Acute diverticulitis [K57.92]                   Date / Time: 11/20/2023 12:19 PM  Location: [address] [place] [address]     Patient Admission Status: Observation   Readmission Risk Low 0-14, Mod 15-19), High > 20: No data recorded  Current PCP: KORIN Hair CNP  PCP verified by CM? Yes    Chart Reviewed: Yes      History Provided by: Patient  Patient Orientation: Alert and Oriented    Patient Cognition: Alert    Hospitalization in the last 30 days (Readmission):  No    If yes, Readmission Assessment in CM Navigator will be completed. Advance Directives:      Code Status: Full Code   Patient's Primary Decision Maker is: Legal Next of Kin      Discharge Planning:    Patient lives with: Children, Spouse/Significant Other Type of Home: House  Primary Care Giver: Self  Patient Support Systems include: Children, Friends/Neighbors   Current Financial resources: Medicaid  Current community resources: None  Current services prior to admission: None            Current DME:              Type of Home Care services:  None    ADLS  Prior functional level: Independent in ADLs/IADLs  Current functional level: Independent in ADLs/IADLs    Family can provide assistance at DC: Yes  Would you like Case Management to discuss the discharge plan with any other family members/significant others, and if so, who?  No  Plans to Return to Present Housing: Yes  Other Identified Issues/Barriers to RETURNING to current housing: No  Potential Assistance needed at discharge: N/A            Potential DME:    Patient expects to discharge to: [address] [place] [address] for transportation at

## 2023-11-21 NOTE — PROGRESS NOTES
Hospitalist Progress Note    Patient:  Kathy Toledo      Unit/Bed:8B-33/033-A    YOB: 1970    MRN: [id number]       Acct: [de-identified]     PCP: KORIN Manning CNP    Date of Admission: 11/20/2023    Assessment/Plan:    Acute diverticulitis to mid and distal descending colon--White count 4.0 (6.7); CT without fluid collection visualized; Rocephin 11/20, Flagyl 11/20; tolerated clear liquid diet so advance to full liquids  TERESA on chronic CKD stage IV, progressive and likely secondary to hypertension nephrosclerosis--no obstructive uropathy is visualized on the CT scan; creatinine improved to 3.3; appears baseline creatinine around 3.2, follows with Dr. Kelsy Penny outpatient~office note from September 24, 2023 states very high risk of needing dialysis  Normocytic anemia--possible hemodilution factor, monitor  Essential hypertension, uncontrolled--on Norvasc, Coreg, hydralazine; monitor  AAA, during 4.2 x 4.2 cm on November 17, 2019  GERD--not on any PPI or H2 blocker per home medication list  Punctate nonobstructing bilateral nephrolithiasis--voiding without issues  4 by 3 mm perifissural nodule in the superior segment of the right lower lobe per CT scan dated November 17, 2019--needs outpatient follow-up with PCP for surveillance  Secondary hyperparathyroidism--follows with nephrology outpatient      Expected discharge date: Pending clinical course    Disposition:    [x] Home       [] TCU       [] Rehab       [] Psych       [] SNF       [] [address]       [] Other-    Chief Complaint: Left lower quadrant abdominal pain    Hospital Course: Per H&P done 11/20/2023: Kathy Toledo is a 47 y.o. female with PMHx that includes: HTN, CKD stage IV, AAA, GERD, diverticulitis who presented to [facility] with chief complaint of LLQ pain and generalized malaise. Patient is alert, oriented x 3. NAD. States she has been in her usual state of health until yesterday.  After eating at a

## 2023-11-22 VITALS
RESPIRATION RATE: 16 BRPM | DIASTOLIC BLOOD PRESSURE: 94 MMHG | BODY MASS INDEX: 26.4 KG/M2 | TEMPERATURE: 98.2 F | OXYGEN SATURATION: 100 % | HEART RATE: 82 BPM | WEIGHT: 168.21 LBS | HEIGHT: 67 IN | SYSTOLIC BLOOD PRESSURE: 136 MMHG

## 2023-11-22 LAB
ANION GAP SERPL CALC-SCNC: 9 MEQ/L (ref 8–16)
BASOPHILS ABSOLUTE: 0 THOU/MM3 (ref 0–0.1)
BASOPHILS NFR BLD AUTO: 0.8 %
BUN SERPL-MCNC: 33 MG/DL (ref 7–22)
CALCIUM SERPL-MCNC: 9.2 MG/DL (ref 8.5–10.5)
CHLORIDE SERPL-SCNC: 106 MEQ/L (ref 98–111)
CO2 SERPL-SCNC: 24 MEQ/L (ref 23–33)
CREAT SERPL-MCNC: 2.9 MG/DL (ref 0.4–1.2)
DEPRECATED RDW RBC AUTO: 47.6 FL (ref 35–45)
EOSINOPHIL NFR BLD AUTO: 3.9 %
EOSINOPHILS ABSOLUTE: 0.1 THOU/MM3 (ref 0–0.4)
ERYTHROCYTE [DISTWIDTH] IN BLOOD BY AUTOMATED COUNT: 13.7 % (ref 11.5–14.5)
GFR SERPL CREATININE-BSD FRML MDRD: 19 ML/MIN/1.73M2
GLUCOSE SERPL-MCNC: 101 MG/DL (ref 70–108)
HCT VFR BLD AUTO: 33.7 % (ref 37–47)
HGB BLD-MCNC: 10.2 GM/DL (ref 12–16)
IMM GRANULOCYTES # BLD AUTO: 0.01 THOU/MM3 (ref 0–0.07)
IMM GRANULOCYTES NFR BLD AUTO: 0.3 %
LYMPHOCYTES ABSOLUTE: 0.9 THOU/MM3 (ref 1–4.8)
LYMPHOCYTES NFR BLD AUTO: 22.5 %
MCH RBC QN AUTO: 28.6 PG (ref 26–33)
MCHC RBC AUTO-ENTMCNC: 30.3 GM/DL (ref 32.2–35.5)
MCV RBC AUTO: 94.4 FL (ref 81–99)
MONOCYTES ABSOLUTE: 0.6 THOU/MM3 (ref 0.4–1.3)
MONOCYTES NFR BLD AUTO: 15.9 %
NEUTROPHILS NFR BLD AUTO: 56.6 %
NRBC BLD AUTO-RTO: 0 /100 WBC
PLATELET # BLD AUTO: 153 THOU/MM3 (ref 130–400)
PMV BLD AUTO: 11 FL (ref 9.4–12.4)
POTASSIUM SERPL-SCNC: 4.8 MEQ/L (ref 3.5–5.2)
RBC # BLD AUTO: 3.57 MILL/MM3 (ref 4.2–5.4)
SEGMENTED NEUTROPHILS ABSOLUTE COUNT: 2.2 THOU/MM3 (ref 1.8–7.7)
SODIUM SERPL-SCNC: 139 MEQ/L (ref 135–145)
WBC # BLD AUTO: 3.8 THOU/MM3 (ref 4.8–10.8)

## 2023-11-22 PROCEDURE — 96366 THER/PROPH/DIAG IV INF ADDON: CPT

## 2023-11-22 PROCEDURE — 6370000000 HC RX 637 (ALT 250 FOR IP): Performed by: NURSE PRACTITIONER

## 2023-11-22 PROCEDURE — G0378 HOSPITAL OBSERVATION PER HR: HCPCS

## 2023-11-22 PROCEDURE — 2580000003 HC RX 258: Performed by: NURSE PRACTITIONER

## 2023-11-22 PROCEDURE — 85025 COMPLETE CBC W/AUTO DIFF WBC: CPT

## 2023-11-22 PROCEDURE — 96372 THER/PROPH/DIAG INJ SC/IM: CPT

## 2023-11-22 PROCEDURE — 96361 HYDRATE IV INFUSION ADD-ON: CPT

## 2023-11-22 PROCEDURE — 80048 BASIC METABOLIC PNL TOTAL CA: CPT

## 2023-11-22 PROCEDURE — 6360000002 HC RX W HCPCS: Performed by: NURSE PRACTITIONER

## 2023-11-22 PROCEDURE — 36415 COLL VENOUS BLD VENIPUNCTURE: CPT

## 2023-11-22 RX ORDER — CIPROFLOXACIN 500 MG/1
750 TABLET, FILM COATED ORAL DAILY
Qty: 8 TABLET | Refills: 0 | Status: SHIPPED | OUTPATIENT
Start: 2023-11-22 | End: 2023-11-27

## 2023-11-22 RX ORDER — METRONIDAZOLE 500 MG/1
500 TABLET ORAL 3 TIMES DAILY
Qty: 15 TABLET | Refills: 0 | Status: SHIPPED | OUTPATIENT
Start: 2023-11-22 | End: 2023-11-27

## 2023-11-22 RX ADMIN — AMLODIPINE BESYLATE 10 MG: 10 TABLET ORAL at 09:25

## 2023-11-22 RX ADMIN — CARVEDILOL 3.12 MG: 3.12 TABLET, FILM COATED ORAL at 09:25

## 2023-11-22 RX ADMIN — HYDRALAZINE HYDROCHLORIDE 50 MG: 50 TABLET, FILM COATED ORAL at 09:25

## 2023-11-22 RX ADMIN — SODIUM CHLORIDE, PRESERVATIVE FREE 10 ML: 5 INJECTION INTRAVENOUS at 09:25

## 2023-11-22 RX ADMIN — METRONIDAZOLE 500 MG: 500 INJECTION, SOLUTION INTRAVENOUS at 09:30

## 2023-11-22 RX ADMIN — METRONIDAZOLE 500 MG: 500 INJECTION, SOLUTION INTRAVENOUS at 00:24

## 2023-11-22 RX ADMIN — ENOXAPARIN SODIUM 30 MG: 100 INJECTION SUBCUTANEOUS at 09:35

## 2023-11-22 RX ADMIN — HYDRALAZINE HYDROCHLORIDE 50 MG: 50 TABLET, FILM COATED ORAL at 13:38

## 2023-11-22 ASSESSMENT — PAIN SCALES - GENERAL: PAINLEVEL_OUTOF10: 0

## 2023-11-22 NOTE — CARE COORDINATION
11/22/23, 2:27 PM EST    Patient goals/plan/ treatment preferences discussed by  and . Patient goals/plan/ treatment preferences reviewed with patient/ family. Patient/ family verbalize understanding of discharge plan and are in agreement with goal/plan/treatment preferences. Understanding was demonstrated using the teach back method. AVS provided by RN at time of discharge, which includes all necessary medical information pertaining to the patients current course of illness, treatment, post-discharge goals of care, and treatment preferences.      Services At/After Discharge: None

## 2023-11-22 NOTE — PLAN OF CARE
Problem: Discharge Planning  Goal: Discharge to home or other facility with appropriate resources  Outcome: Progressing     Problem: Pain  Goal: Verbalizes/displays adequate comfort level or baseline comfort level  Outcome: Progressing   Care plan reviewed with patient . Patient verbalize understanding of the plan of care and contribute to goal setting.

## 2023-11-28 ENCOUNTER — HOSPITAL ENCOUNTER (EMERGENCY)
Age: 53
Discharge: HOME | End: 2023-11-28
Attending: EMERGENCY MEDICINE
Payer: COMMERCIAL

## 2023-11-28 VITALS
TEMPERATURE: 98.4 F | RESPIRATION RATE: 17 BRPM | DIASTOLIC BLOOD PRESSURE: 118 MMHG | SYSTOLIC BLOOD PRESSURE: 152 MMHG | HEART RATE: 90 BPM | OXYGEN SATURATION: 98 %

## 2023-11-28 DIAGNOSIS — T14.8XXA MUSCLE STRAIN: Primary | ICD-10-CM

## 2023-11-28 PROCEDURE — 6360000002 HC RX W HCPCS: Performed by: EMERGENCY MEDICINE

## 2023-11-28 PROCEDURE — 6370000000 HC RX 637 (ALT 250 FOR IP): Performed by: EMERGENCY MEDICINE

## 2023-11-28 PROCEDURE — 99284 EMERGENCY DEPT VISIT MOD MDM: CPT

## 2023-11-28 PROCEDURE — 96372 THER/PROPH/DIAG INJ SC/IM: CPT

## 2023-11-28 RX ORDER — ACETAMINOPHEN 500 MG
1000 TABLET ORAL ONCE
Status: COMPLETED | OUTPATIENT
Start: 2023-11-28 | End: 2023-11-28

## 2023-11-28 RX ORDER — ORPHENADRINE CITRATE 30 MG/ML
60 INJECTION INTRAMUSCULAR; INTRAVENOUS ONCE
Status: COMPLETED | OUTPATIENT
Start: 2023-11-28 | End: 2023-11-28

## 2023-11-28 RX ADMIN — ORPHENADRINE CITRATE 60 MG: 60 INJECTION INTRAMUSCULAR; INTRAVENOUS at 08:14

## 2023-11-28 RX ADMIN — ACETAMINOPHEN 1000 MG: 500 TABLET ORAL at 08:13

## 2023-11-28 NOTE — ED PROVIDER NOTES
[facility] EMERGENCY DEPT    EMERGENCY MEDICINE      Pt Name: Delfin Molina  MRN: [id number]  [address] [facility] 1970  Date of evaluation: 2023  Treating Physician: Vicente Gupta MD    [address]       Chief Complaint   Patient presents with    Shoulder Pain    Arm Pain    Hip Pain     History obtained from chart review and the patient. HISTORY OF PRESENT ILLNESS    HPI    Delfin Molina is a 47 y.o. female with PMH of HTN, diverticulosis presents to the emergency department for evaluation of right shoulder and hip pain. Patient stated that yesterday she had tripped at [employer] falling to her right side where she reached out and caught herself at the same time somebody caught her before she fell. Patient denied hitting her head, losing consciousness and stated at that time she did not have any pain but when she woke up this morning her right shoulder is sore    The patient has no other acute complaints at this time.     PAST MEDICAL AND SURGICAL HISTORY     Past Medical History:   Diagnosis Date    Aneurysm ([address]) 2017    Anxiety     Chronic kidney disease     Dental abscess 2021    right lower jaw/tooth    Eczema     all her life    GERD (gastroesophageal reflux disease)     Hypertension     Mastoiditis     Thyroid disease     having thyroid removed 13       Past Surgical History:   Procedure Laterality Date    CARDIAC SURGERY       SECTION  86 87 90    ENDOSCOPY, COLON, DIAGNOSTIC      HEEL SPUR SURGERY Right     [place] EGD TRANSORAL BIOPSY SINGLE/MULTIPLE Left 2018    EGD BIOPSY performed by Xiomara Mckeon MD at [facility]    SALPINGECTOMY      tubal pregnancy    THYROIDECTOMY, PARTIAL  2011    TUBAL LIGATION  2010    TUNNELED VENOUS PORT PLACEMENT Left     mediport in chest       CURRENT MEDICATIONS     Discharge Medication List as of 2023  8:31 AM        CONTINUE these medications which have NOT CHANGED    Details   amLODIPine (NORVASC) 10 MG tablet take 1

## 2023-11-28 NOTE — ED NOTES
Pt TO ed via intake with c/o shoulder arm and hip pain all on the right side after falling yesterday. Pt reports they slipped on the wet floor. Pt is ambulatory. Pt denies any medications PTA.      Tyson Ballard RN  11/28/23 4566

## 2023-12-07 ENCOUNTER — HOSPITAL ENCOUNTER (EMERGENCY)
Age: 53
Discharge: HOME | End: 2023-12-07
Payer: COMMERCIAL

## 2023-12-07 VITALS
TEMPERATURE: 97.4 F | HEIGHT: 67 IN | RESPIRATION RATE: 16 BRPM | BODY MASS INDEX: 27.47 KG/M2 | HEART RATE: 99 BPM | SYSTOLIC BLOOD PRESSURE: 144 MMHG | WEIGHT: 175 LBS | DIASTOLIC BLOOD PRESSURE: 91 MMHG | OXYGEN SATURATION: 100 %

## 2023-12-07 DIAGNOSIS — U09.9 POST-COVID SYNDROME: Primary | ICD-10-CM

## 2023-12-07 PROCEDURE — 99213 OFFICE O/P EST LOW 20 MIN: CPT | Performed by: NURSE PRACTITIONER

## 2023-12-07 ASSESSMENT — PAIN SCALES - GENERAL: PAINLEVEL_OUTOF10: 6

## 2023-12-07 ASSESSMENT — ENCOUNTER SYMPTOMS
TROUBLE SWALLOWING: 0
EYE REDNESS: 0
SHORTNESS OF BREATH: 0
DIARRHEA: 0
SORE THROAT: 0
NAUSEA: 1
VOMITING: 0
RHINORRHEA: 0
EYE DISCHARGE: 0
COUGH: 0

## 2023-12-07 ASSESSMENT — PAIN DESCRIPTION - DESCRIPTORS: DESCRIPTORS: ACHING

## 2023-12-07 ASSESSMENT — PAIN DESCRIPTION - LOCATION: LOCATION: GENERALIZED

## 2023-12-07 ASSESSMENT — PAIN DESCRIPTION - PAIN TYPE: TYPE: ACUTE PAIN

## 2023-12-07 NOTE — ED TRIAGE NOTES
Patient ambulated to room with complaint of chills, cough, body ache that started 7 days ago. States she had a positive covid test 6 days ago then test negative last night and has been exposed to covid again.

## 2023-12-11 ENCOUNTER — APPOINTMENT (OUTPATIENT)
Dept: CT IMAGING | Age: 53
End: 2023-12-11
Payer: COMMERCIAL

## 2023-12-11 ENCOUNTER — HOSPITAL ENCOUNTER (EMERGENCY)
Age: 53
Discharge: HOME | End: 2023-12-11
Payer: COMMERCIAL

## 2023-12-11 VITALS
RESPIRATION RATE: 20 BRPM | OXYGEN SATURATION: 100 % | DIASTOLIC BLOOD PRESSURE: 109 MMHG | HEART RATE: 92 BPM | TEMPERATURE: 97.9 F | SYSTOLIC BLOOD PRESSURE: 130 MMHG

## 2023-12-11 DIAGNOSIS — N18.30 STAGE 3 CHRONIC KIDNEY DISEASE, UNSPECIFIED WHETHER STAGE 3A OR 3B CKD (HCC): ICD-10-CM

## 2023-12-11 DIAGNOSIS — H81.11 BENIGN PAROXYSMAL POSITIONAL VERTIGO OF RIGHT EAR: Primary | ICD-10-CM

## 2023-12-11 LAB
ALBUMIN SERPL BCG-MCNC: 4.2 G/DL (ref 3.5–5.1)
ALP SERPL-CCNC: 69 U/L (ref 38–126)
ALT SERPL W/O P-5'-P-CCNC: 10 U/L (ref 11–66)
ANION GAP SERPL CALC-SCNC: 8 MEQ/L (ref 8–16)
AST SERPL-CCNC: 14 U/L (ref 5–40)
BASOPHILS ABSOLUTE: 0 THOU/MM3 (ref 0–0.1)
BASOPHILS NFR BLD AUTO: 0.6 %
BILIRUB SERPL-MCNC: 0.4 MG/DL (ref 0.3–1.2)
BUN SERPL-MCNC: 50 MG/DL (ref 7–22)
CALCIUM SERPL-MCNC: 9.3 MG/DL (ref 8.5–10.5)
CHLORIDE SERPL-SCNC: 107 MEQ/L (ref 98–111)
CO2 SERPL-SCNC: 22 MEQ/L (ref 23–33)
CREAT SERPL-MCNC: 3.1 MG/DL (ref 0.4–1.2)
DEPRECATED RDW RBC AUTO: 47.9 FL (ref 35–45)
EOSINOPHIL NFR BLD AUTO: 1.5 %
EOSINOPHILS ABSOLUTE: 0 THOU/MM3 (ref 0–0.4)
ERYTHROCYTE [DISTWIDTH] IN BLOOD BY AUTOMATED COUNT: 14.2 % (ref 11.5–14.5)
FLUAV RNA RESP QL NAA+PROBE: NOT DETECTED
FLUBV RNA RESP QL NAA+PROBE: NOT DETECTED
GFR SERPL CREATININE-BSD FRML MDRD: 17 ML/MIN/1.73M2
GLUCOSE SERPL-MCNC: 91 MG/DL (ref 70–108)
HCT VFR BLD AUTO: 38.9 % (ref 37–47)
HGB BLD-MCNC: 11.8 GM/DL (ref 12–16)
IMM GRANULOCYTES # BLD AUTO: 0.02 THOU/MM3 (ref 0–0.07)
IMM GRANULOCYTES NFR BLD AUTO: 0.6 %
LYMPHOCYTES ABSOLUTE: 0.9 THOU/MM3 (ref 1–4.8)
LYMPHOCYTES NFR BLD AUTO: 27.3 %
MCH RBC QN AUTO: 28.2 PG (ref 26–33)
MCHC RBC AUTO-ENTMCNC: 30.3 GM/DL (ref 32.2–35.5)
MCV RBC AUTO: 93.1 FL (ref 81–99)
MONOCYTES ABSOLUTE: 0.5 THOU/MM3 (ref 0.4–1.3)
MONOCYTES NFR BLD AUTO: 13.9 %
NEUTROPHILS NFR BLD AUTO: 56.1 %
NRBC BLD AUTO-RTO: 0 /100 WBC
OSMOLALITY SERPL CALC.SUM OF ELEC: 286.7 MOSMOL/KG (ref 275–300)
PLATELET # BLD AUTO: 208 THOU/MM3 (ref 130–400)
PMV BLD AUTO: 11.3 FL (ref 9.4–12.4)
POTASSIUM SERPL-SCNC: 5 MEQ/L (ref 3.5–5.2)
PROT SERPL-MCNC: 7.2 G/DL (ref 6.1–8)
RBC # BLD AUTO: 4.18 MILL/MM3 (ref 4.2–5.4)
SARS-COV-2 RNA RESP QL NAA+PROBE: DETECTED
SEGMENTED NEUTROPHILS ABSOLUTE COUNT: 1.9 THOU/MM3 (ref 1.8–7.7)
SODIUM SERPL-SCNC: 137 MEQ/L (ref 135–145)
WBC # BLD AUTO: 3.3 THOU/MM3 (ref 4.8–10.8)

## 2023-12-11 PROCEDURE — 96374 THER/PROPH/DIAG INJ IV PUSH: CPT

## 2023-12-11 PROCEDURE — 99284 EMERGENCY DEPT VISIT MOD MDM: CPT

## 2023-12-11 PROCEDURE — 70450 CT HEAD/BRAIN W/O DYE: CPT

## 2023-12-11 PROCEDURE — 2580000003 HC RX 258: Performed by: PHYSICIAN ASSISTANT

## 2023-12-11 PROCEDURE — 85025 COMPLETE CBC W/AUTO DIFF WBC: CPT

## 2023-12-11 PROCEDURE — 87636 SARSCOV2 & INF A&B AMP PRB: CPT

## 2023-12-11 PROCEDURE — 36415 COLL VENOUS BLD VENIPUNCTURE: CPT

## 2023-12-11 PROCEDURE — 6370000000 HC RX 637 (ALT 250 FOR IP): Performed by: PHYSICIAN ASSISTANT

## 2023-12-11 PROCEDURE — 93005 ELECTROCARDIOGRAM TRACING: CPT | Performed by: PHYSICIAN ASSISTANT

## 2023-12-11 PROCEDURE — 80053 COMPREHEN METABOLIC PANEL: CPT

## 2023-12-11 PROCEDURE — 6360000002 HC RX W HCPCS: Performed by: PHYSICIAN ASSISTANT

## 2023-12-11 PROCEDURE — 96361 HYDRATE IV INFUSION ADD-ON: CPT

## 2023-12-11 RX ORDER — 0.9 % SODIUM CHLORIDE 0.9 %
1000 INTRAVENOUS SOLUTION INTRAVENOUS ONCE
Status: COMPLETED | OUTPATIENT
Start: 2023-12-11 | End: 2023-12-11

## 2023-12-11 RX ORDER — LORAZEPAM 1 MG/1
1 TABLET ORAL ONCE
Status: COMPLETED | OUTPATIENT
Start: 2023-12-11 | End: 2023-12-11

## 2023-12-11 RX ORDER — LORAZEPAM 0.5 MG/1
0.25 TABLET ORAL 3 TIMES DAILY PRN
Qty: 6 TABLET | Refills: 0 | Status: SHIPPED | OUTPATIENT
Start: 2023-12-11 | End: 2023-12-15

## 2023-12-11 RX ORDER — MECLIZINE HCL 25MG 25 MG/1
25 TABLET, CHEWABLE ORAL ONCE
Status: COMPLETED | OUTPATIENT
Start: 2023-12-11 | End: 2023-12-11

## 2023-12-11 RX ORDER — ONDANSETRON 4 MG/1
4 TABLET, ORALLY DISINTEGRATING ORAL EVERY 8 HOURS PRN
Qty: 20 TABLET | Refills: 0 | Status: SHIPPED | OUTPATIENT
Start: 2023-12-11

## 2023-12-11 RX ORDER — MECLIZINE HYDROCHLORIDE 25 MG/1
25 TABLET ORAL 3 TIMES DAILY PRN
Qty: 30 TABLET | Refills: 0 | Status: SHIPPED | OUTPATIENT
Start: 2023-12-11 | End: 2023-12-21

## 2023-12-11 RX ORDER — ONDANSETRON 2 MG/ML
4 INJECTION INTRAMUSCULAR; INTRAVENOUS ONCE
Status: COMPLETED | OUTPATIENT
Start: 2023-12-11 | End: 2023-12-11

## 2023-12-11 RX ADMIN — ONDANSETRON 4 MG: 2 INJECTION INTRAMUSCULAR; INTRAVENOUS at 14:50

## 2023-12-11 RX ADMIN — LORAZEPAM 1 MG: 1 TABLET ORAL at 14:50

## 2023-12-11 RX ADMIN — SODIUM CHLORIDE 1000 ML: 9 INJECTION, SOLUTION INTRAVENOUS at 14:49

## 2023-12-11 RX ADMIN — MECLIZINE HYDROCHLORIDE 25 MG: 25 TABLET, CHEWABLE ORAL at 14:50

## 2023-12-11 ASSESSMENT — ENCOUNTER SYMPTOMS
PHOTOPHOBIA: 0
ABDOMINAL PAIN: 0
COUGH: 1
DIARRHEA: 0
VOMITING: 1
RHINORRHEA: 0
NAUSEA: 1
SHORTNESS OF BREATH: 1
TROUBLE SWALLOWING: 0
SORE THROAT: 0

## 2023-12-11 NOTE — ED NOTES
Patient ambulated in the garcia with steady gait. Patient reports improvement from the medications .      Lonny Fleischer, RN  12/11/23 0700       An Ledbetter RN  12/11/23 0914

## 2023-12-11 NOTE — ED NOTES
RN to room to ambulate pt. Pt sat up in chair and states \"the room is spinning\". Pt appeared unsteady and stayed seated. Unable to ambulate at this time.       Beryl Mercado RN  12/11/23 8982

## 2023-12-12 LAB
EKG ATRIAL RATE: 91 BPM
EKG P AXIS: 51 DEGREES
EKG P-R INTERVAL: 154 MS
EKG Q-T INTERVAL: 356 MS
EKG QRS DURATION: 76 MS
EKG QTC CALCULATION (BAZETT): 437 MS
EKG R AXIS: -78 DEGREES
EKG T AXIS: 83 DEGREES
EKG VENTRICULAR RATE: 91 BPM

## 2023-12-12 PROCEDURE — 93010 ELECTROCARDIOGRAM REPORT: CPT | Performed by: INTERNAL MEDICINE

## 2024-07-17 DIAGNOSIS — N25.81 SECONDARY HYPERPARATHYROIDISM (HCC): ICD-10-CM

## 2024-07-17 DIAGNOSIS — N18.4 CKD (CHRONIC KIDNEY DISEASE), STAGE IV (HCC): Primary | ICD-10-CM

## 2024-07-17 DIAGNOSIS — I12.9 HYPERTENSIVE RENAL DISEASE, STAGE 1 THROUGH STAGE 4 OR UNSPECIFIED CHRONIC KIDNEY DISEASE: ICD-10-CM

## 2024-07-17 RX ORDER — CARVEDILOL 3.12 MG/1
3.12 TABLET ORAL 2 TIMES DAILY
Qty: 60 TABLET | Refills: 0 | Status: SHIPPED | OUTPATIENT
Start: 2024-07-17

## 2024-07-17 RX ORDER — HYDRALAZINE HYDROCHLORIDE 50 MG/1
50 TABLET, FILM COATED ORAL 3 TIMES DAILY
Qty: 90 TABLET | Refills: 0 | Status: SHIPPED | OUTPATIENT
Start: 2024-07-17

## 2024-12-09 ENCOUNTER — TELEPHONE (OUTPATIENT)
Dept: NEPHROLOGY | Age: 54
End: 2024-12-09

## 2024-12-09 NOTE — TELEPHONE ENCOUNTER
She is a new dialysis patient. Will see her at dialysis unit. No need for office appt at this time while she is on dialysis treatments.

## 2024-12-09 NOTE — TELEPHONE ENCOUNTER
Raiza called from [facility] to schedule [place] for a hospital FU with Dr. Sanchez. She says Dr. Sanchez requested 1 week. She will have patient repeat a BMP before next appointment. I scheduled her on 12/16/24. Please advise if you would like this date changed.

## 2024-12-10 NOTE — TELEPHONE ENCOUNTER
Left message informing pt that Dr. Sanchez will see pt at the dialysis and that there is no reason she needs to come to the office. Cancelled 12/16/24 appt.

## 2025-01-21 ENCOUNTER — OFFICE VISIT (OUTPATIENT)
Age: 55
End: 2025-01-21
Payer: COMMERCIAL

## 2025-01-21 VITALS — HEART RATE: 80 BPM | SYSTOLIC BLOOD PRESSURE: 133 MMHG | DIASTOLIC BLOOD PRESSURE: 90 MMHG

## 2025-01-21 DIAGNOSIS — N18.6 END STAGE RENAL DISEASE (HCC): Primary | ICD-10-CM

## 2025-01-21 PROCEDURE — 99203 OFFICE O/P NEW LOW 30 MIN: CPT | Performed by: SURGERY

## 2025-01-21 PROCEDURE — 4004F PT TOBACCO SCREEN RCVD TLK: CPT | Performed by: SURGERY

## 2025-01-21 PROCEDURE — G8427 DOCREV CUR MEDS BY ELIG CLIN: HCPCS | Performed by: SURGERY

## 2025-01-21 PROCEDURE — 3017F COLORECTAL CA SCREEN DOC REV: CPT | Performed by: SURGERY

## 2025-01-21 PROCEDURE — 3075F SYST BP GE 130 - 139MM HG: CPT | Performed by: SURGERY

## 2025-01-21 PROCEDURE — G8421 BMI NOT CALCULATED: HCPCS | Performed by: SURGERY

## 2025-01-21 PROCEDURE — 3080F DIAST BP >= 90 MM HG: CPT | Performed by: SURGERY

## 2025-01-21 RX ORDER — ATORVASTATIN CALCIUM 20 MG/1
20 TABLET, FILM COATED ORAL DAILY
COMMUNITY
Start: 2025-01-10

## 2025-01-21 RX ORDER — ASPIRIN 325 MG
81 TABLET ORAL DAILY
COMMUNITY
Start: 2025-01-03

## 2025-01-21 RX ORDER — DOXAZOSIN 2 MG/1
1 TABLET ORAL
COMMUNITY
Start: 2024-11-29

## 2025-01-21 NOTE — PATIENT INSTRUCTIONS
If you receive a survey asking about your care experience, please respond. Your answers will help ensure you receive high-quality care at this office. Thank you!    Your Medical Assistant today: Shirley PHILLIPS  Thank you for coming to our office! It was a pleasure to serve you.

## 2025-01-21 NOTE — PROGRESS NOTES
[facility] [place] [facility]  [facility] VASCULAR SURGERY  [address], [address]  [facility] [address]  Dept: [phone]  Loc: [phone]     Patient: Lucero Brown  : 1970  MRN: [id number]  DOS: 2025    Referring provider:  Linus Sanchez MD         HPI:  Lucero Brown is a 54 y.o. female who comes to the office for the first time regarding end-stage renal disease and the need for permanent dialysis access construction.  She is currently being dialyzed through a right IJ tunneled catheter.  She has never had dialysis access construction in the past.  She has high blood pressure causing her end-stage renal disease.  She does not have diabetes.  Past Medical History:   Diagnosis Date    Aneurysm (HCC) 2017    Anxiety     Chronic kidney disease     Dental abscess 2021    right lower jaw/tooth    Eczema     all her life    GERD (gastroesophageal reflux disease)     Hypertension     Mastoiditis     Thyroid disease     having thyroid removed 13     Family History   Problem Relation Age of Onset    Cancer Mother     Cancer Maternal Grandfather         N/a    Diabetes Paternal Grandfather     Heart Disease Paternal Grandfather       Social History     Socioeconomic History    Marital status: Single     Spouse name: Not on file    Number of children: 3    Years of education: 12    Highest education level: Not on file   Occupational History    Occupation: unemployed   Tobacco Use    Smoking status: Some Days     Current packs/day: 0.25     Average packs/day: 0.3 packs/day for 33.0 years (8.3 ttl pk-yrs)     Types: Cigarettes    Smokeless tobacco: Never   Vaping Use    Vaping status: Never Used   Substance and Sexual Activity    Alcohol use: Not Currently     Comment: social, occasionally    Drug use: Not Currently     Types: Cocaine     Comment: Marijuana and cocain use four months ago    Sexual activity: Yes     Partners: Male   Other Topics Concern

## 2025-01-22 ENCOUNTER — TELEPHONE (OUTPATIENT)
Age: 55
End: 2025-01-22

## 2025-01-22 NOTE — TELEPHONE ENCOUNTER
Surgery Scheduling Form   [facility] [address], [place] [address]    Phone * [phone]   Surgical Scheduling Direct line Phone * [phone]  Fax * [phone]      Lucero Brown      1970    female    [address]  [facility] [address]   Marital Status:    S     Home Phone: [phone]   Cell Phone:   Telephone Information:   Mobile [phone]              Surgeon:Dr. Ruiz  Surgery Date:3/5/2025   Time: 7:30 am     Procedure: LT UE AV Fistula vs Graft                        Outpatient     Diagnosis: N18.6    CPT Codes: 77299    Latex Allergy:   no Cardiac Device:  no    Anesthesia Type: MAC    Case Location:  OR     Preadmission Testing: Phone Call      PAT Date and Time: ________________________________    PAT Confirmation #: _________________________________    Post Op Visit:  ______________________________________    Need Preop Cardiac Clearance:   no    Does patient have Cardiologist/physician?       Surgery Conformation #:  ______________________________________________    [facility]1/22/2025__________________

## 2025-01-22 NOTE — TELEPHONE ENCOUNTER
Lucero is scheduled for LT UE AV Fistula vs Graft with  Dr. Ruiz on 3/5/2025 at 7:30 am. Pt agreed to date/time.     Surgery instructions are as follows:  - Arrive to Same Day Surgery at [facility] at 5:30 am  am  - NPO after midnight the night before surgery  - No medication holds per Dr. Ruiz  - You must have a  day of surgery     No PAT Per Dr. Ruiz      All instructions discussed with pt, she verbalized understanding. she  denied questions or concerns at this time.     Primary insurance is [employer]. Will obtain prior authorization as necessary.

## 2025-01-23 ENCOUNTER — PREP FOR PROCEDURE (OUTPATIENT)
Age: 55
End: 2025-01-23

## 2025-01-23 DIAGNOSIS — N18.6 END STAGE RENAL DISEASE (HCC): ICD-10-CM

## 2025-02-11 RX ORDER — SODIUM CHLORIDE 9 MG/ML
INJECTION, SOLUTION INTRAVENOUS PRN
Status: CANCELLED | OUTPATIENT
Start: 2025-02-11

## 2025-02-11 RX ORDER — SODIUM CHLORIDE 0.9 % (FLUSH) 0.9 %
5-40 SYRINGE (ML) INJECTION PRN
Status: CANCELLED | OUTPATIENT
Start: 2025-02-11

## 2025-02-11 RX ORDER — SODIUM CHLORIDE 9 MG/ML
INJECTION, SOLUTION INTRAVENOUS CONTINUOUS
Status: CANCELLED | OUTPATIENT
Start: 2025-02-11

## 2025-02-11 RX ORDER — SODIUM CHLORIDE 0.9 % (FLUSH) 0.9 %
5-40 SYRINGE (ML) INJECTION EVERY 12 HOURS SCHEDULED
Status: CANCELLED | OUTPATIENT
Start: 2025-02-11

## 2025-02-28 RX ORDER — CARVEDILOL 12.5 MG/1
12.5 TABLET ORAL 2 TIMES DAILY WITH MEALS
COMMUNITY

## 2025-02-28 RX ORDER — HYDRALAZINE HYDROCHLORIDE 100 MG/1
100 TABLET, FILM COATED ORAL 3 TIMES DAILY
COMMUNITY

## 2025-03-05 ENCOUNTER — ANESTHESIA EVENT (OUTPATIENT)
Dept: OPERATING ROOM | Age: 55
End: 2025-03-05
Payer: COMMERCIAL

## 2025-03-05 ENCOUNTER — ANESTHESIA (OUTPATIENT)
Dept: OPERATING ROOM | Age: 55
End: 2025-03-05
Payer: COMMERCIAL

## 2025-03-05 ENCOUNTER — HOSPITAL ENCOUNTER (OUTPATIENT)
Age: 55
Discharge: HOME | End: 2025-03-05
Attending: SURGERY | Admitting: SURGERY
Payer: COMMERCIAL

## 2025-03-05 VITALS
RESPIRATION RATE: 16 BRPM | TEMPERATURE: 97.9 F | SYSTOLIC BLOOD PRESSURE: 125 MMHG | HEART RATE: 80 BPM | HEIGHT: 67 IN | WEIGHT: 154.2 LBS | OXYGEN SATURATION: 100 % | DIASTOLIC BLOOD PRESSURE: 88 MMHG | BODY MASS INDEX: 24.2 KG/M2

## 2025-03-05 PROBLEM — Z99.2 ENCOUNTER REGARDING VASCULAR ACCESS FOR DIALYSIS FOR END-STAGE RENAL DISEASE (HCC): Status: ACTIVE | Noted: 2025-03-05

## 2025-03-05 PROBLEM — N18.6 ENCOUNTER REGARDING VASCULAR ACCESS FOR DIALYSIS FOR END-STAGE RENAL DISEASE (HCC): Status: ACTIVE | Noted: 2025-03-05

## 2025-03-05 LAB
ANION GAP SERPL CALC-SCNC: 10 MEQ/L (ref 8–16)
BUN SERPL-MCNC: 22 MG/DL (ref 8–23)
CALCIUM SERPL-MCNC: 9.2 MG/DL (ref 8.6–10)
CHLORIDE SERPL-SCNC: 103 MEQ/L (ref 98–111)
CO2 SERPL-SCNC: 27 MEQ/L (ref 22–29)
CREAT SERPL-MCNC: 4.7 MG/DL (ref 0.5–0.9)
EKG ATRIAL RATE: 78 BPM
EKG P AXIS: 33 DEGREES
EKG P-R INTERVAL: 162 MS
EKG Q-T INTERVAL: 408 MS
EKG QRS DURATION: 86 MS
EKG QTC CALCULATION (BAZETT): 465 MS
EKG R AXIS: -82 DEGREES
EKG T AXIS: 110 DEGREES
EKG VENTRICULAR RATE: 78 BPM
GFR SERPL CREATININE-BSD FRML MDRD: 10 ML/MIN/1.73M2
GLUCOSE SERPL-MCNC: 101 MG/DL (ref 74–109)
HCT VFR BLD AUTO: 35.2 % (ref 37–47)
HGB BLD-MCNC: 10.7 GM/DL (ref 12–16)
POTASSIUM SERPL-SCNC: 4.7 MEQ/L (ref 3.5–5.2)
SODIUM SERPL-SCNC: 140 MEQ/L (ref 135–145)

## 2025-03-05 PROCEDURE — 3700000000 HC ANESTHESIA ATTENDED CARE: Performed by: SURGERY

## 2025-03-05 PROCEDURE — 2500000003 HC RX 250 WO HCPCS: Performed by: NURSE ANESTHETIST, CERTIFIED REGISTERED

## 2025-03-05 PROCEDURE — 6360000002 HC RX W HCPCS: Performed by: NURSE ANESTHETIST, CERTIFIED REGISTERED

## 2025-03-05 PROCEDURE — 80048 BASIC METABOLIC PNL TOTAL CA: CPT

## 2025-03-05 PROCEDURE — 7100000000 HC PACU RECOVERY - FIRST 15 MIN: Performed by: SURGERY

## 2025-03-05 PROCEDURE — 36415 COLL VENOUS BLD VENIPUNCTURE: CPT

## 2025-03-05 PROCEDURE — 7100000001 HC PACU RECOVERY - ADDTL 15 MIN: Performed by: SURGERY

## 2025-03-05 PROCEDURE — 6360000002 HC RX W HCPCS

## 2025-03-05 PROCEDURE — 2500000003 HC RX 250 WO HCPCS: Performed by: PHYSICIAN ASSISTANT

## 2025-03-05 PROCEDURE — 6360000002 HC RX W HCPCS: Performed by: PHYSICIAN ASSISTANT

## 2025-03-05 PROCEDURE — 3600000003 HC SURGERY LEVEL 3 BASE: Performed by: SURGERY

## 2025-03-05 PROCEDURE — 7100000010 HC PHASE II RECOVERY - FIRST 15 MIN: Performed by: SURGERY

## 2025-03-05 PROCEDURE — 93005 ELECTROCARDIOGRAM TRACING: CPT | Performed by: PHYSICIAN ASSISTANT

## 2025-03-05 PROCEDURE — 85018 HEMOGLOBIN: CPT

## 2025-03-05 PROCEDURE — 6360000002 HC RX W HCPCS: Performed by: SURGERY

## 2025-03-05 PROCEDURE — 2709999900 HC NON-CHARGEABLE SUPPLY: Performed by: SURGERY

## 2025-03-05 PROCEDURE — 6370000000 HC RX 637 (ALT 250 FOR IP): Performed by: SURGERY

## 2025-03-05 PROCEDURE — 3600000013 HC SURGERY LEVEL 3 ADDTL 15MIN: Performed by: SURGERY

## 2025-03-05 PROCEDURE — 2500000003 HC RX 250 WO HCPCS: Performed by: SURGERY

## 2025-03-05 PROCEDURE — 85014 HEMATOCRIT: CPT

## 2025-03-05 PROCEDURE — 7100000011 HC PHASE II RECOVERY - ADDTL 15 MIN: Performed by: SURGERY

## 2025-03-05 PROCEDURE — 3700000001 HC ADD 15 MINUTES (ANESTHESIA): Performed by: SURGERY

## 2025-03-05 PROCEDURE — 2580000003 HC RX 258: Performed by: PHYSICIAN ASSISTANT

## 2025-03-05 RX ORDER — PROPOFOL 10 MG/ML
INJECTION, EMULSION INTRAVENOUS
Status: DISCONTINUED | OUTPATIENT
Start: 2025-03-05 | End: 2025-03-05 | Stop reason: SDUPTHER

## 2025-03-05 RX ORDER — PROTAMINE SULFATE 10 MG/ML
INJECTION, SOLUTION INTRAVENOUS
Status: DISCONTINUED | OUTPATIENT
Start: 2025-03-05 | End: 2025-03-05 | Stop reason: SDUPTHER

## 2025-03-05 RX ORDER — FENTANYL CITRATE 50 UG/ML
INJECTION, SOLUTION INTRAMUSCULAR; INTRAVENOUS
Status: DISCONTINUED | OUTPATIENT
Start: 2025-03-05 | End: 2025-03-05 | Stop reason: SDUPTHER

## 2025-03-05 RX ORDER — HEPARIN SODIUM 1000 [USP'U]/ML
INJECTION, SOLUTION INTRAVENOUS; SUBCUTANEOUS
Status: DISCONTINUED | OUTPATIENT
Start: 2025-03-05 | End: 2025-03-05 | Stop reason: SDUPTHER

## 2025-03-05 RX ORDER — SODIUM CHLORIDE 0.9 % (FLUSH) 0.9 %
5-40 SYRINGE (ML) INJECTION PRN
Status: DISCONTINUED | OUTPATIENT
Start: 2025-03-05 | End: 2025-03-05 | Stop reason: HOSPADM

## 2025-03-05 RX ORDER — SODIUM CHLORIDE 0.9 % (FLUSH) 0.9 %
5-40 SYRINGE (ML) INJECTION EVERY 12 HOURS SCHEDULED
Status: DISCONTINUED | OUTPATIENT
Start: 2025-03-05 | End: 2025-03-05 | Stop reason: HOSPADM

## 2025-03-05 RX ORDER — SODIUM CHLORIDE 9 MG/ML
INJECTION, SOLUTION INTRAVENOUS CONTINUOUS
Status: DISCONTINUED | OUTPATIENT
Start: 2025-03-05 | End: 2025-03-05 | Stop reason: HOSPADM

## 2025-03-05 RX ORDER — MIDAZOLAM HYDROCHLORIDE 1 MG/ML
INJECTION, SOLUTION INTRAMUSCULAR; INTRAVENOUS
Status: DISCONTINUED | OUTPATIENT
Start: 2025-03-05 | End: 2025-03-05 | Stop reason: SDUPTHER

## 2025-03-05 RX ORDER — LIDOCAINE HCL/PF 100 MG/5ML
SYRINGE (ML) INJECTION
Status: DISCONTINUED | OUTPATIENT
Start: 2025-03-05 | End: 2025-03-05 | Stop reason: SDUPTHER

## 2025-03-05 RX ORDER — FENTANYL CITRATE 50 UG/ML
INJECTION, SOLUTION INTRAMUSCULAR; INTRAVENOUS
Status: COMPLETED
Start: 2025-03-05 | End: 2025-03-05

## 2025-03-05 RX ORDER — LIDOCAINE HYDROCHLORIDE 10 MG/ML
INJECTION, SOLUTION EPIDURAL; INFILTRATION; INTRACAUDAL; PERINEURAL PRN
Status: DISCONTINUED | OUTPATIENT
Start: 2025-03-05 | End: 2025-03-05 | Stop reason: ALTCHOICE

## 2025-03-05 RX ORDER — FENTANYL CITRATE 50 UG/ML
50 INJECTION, SOLUTION INTRAMUSCULAR; INTRAVENOUS EVERY 5 MIN PRN
Status: DISCONTINUED | OUTPATIENT
Start: 2025-03-05 | End: 2025-03-05 | Stop reason: HOSPADM

## 2025-03-05 RX ORDER — MAGNESIUM HYDROXIDE 1200 MG/15ML
LIQUID ORAL CONTINUOUS PRN
Status: COMPLETED | OUTPATIENT
Start: 2025-03-05 | End: 2025-03-05

## 2025-03-05 RX ORDER — HEPARIN SODIUM 1000 [USP'U]/ML
INJECTION, SOLUTION INTRAVENOUS; SUBCUTANEOUS PRN
Status: DISCONTINUED | OUTPATIENT
Start: 2025-03-05 | End: 2025-03-05 | Stop reason: ALTCHOICE

## 2025-03-05 RX ORDER — ACETAMINOPHEN 500 MG
1000 TABLET ORAL ONCE
Status: COMPLETED | OUTPATIENT
Start: 2025-03-05 | End: 2025-03-05

## 2025-03-05 RX ORDER — PHENYLEPHRINE HCL IN 0.9% NACL 1 MG/10 ML
SYRINGE (ML) INTRAVENOUS
Status: DISCONTINUED | OUTPATIENT
Start: 2025-03-05 | End: 2025-03-05 | Stop reason: SDUPTHER

## 2025-03-05 RX ORDER — EPHEDRINE SULFATE/0.9% NACL/PF 25 MG/5 ML
SYRINGE (ML) INTRAVENOUS
Status: DISCONTINUED | OUTPATIENT
Start: 2025-03-05 | End: 2025-03-05 | Stop reason: SDUPTHER

## 2025-03-05 RX ORDER — SODIUM CHLORIDE 9 MG/ML
INJECTION, SOLUTION INTRAVENOUS PRN
Status: DISCONTINUED | OUTPATIENT
Start: 2025-03-05 | End: 2025-03-05 | Stop reason: HOSPADM

## 2025-03-05 RX ADMIN — MIDAZOLAM 1 MG: 1 INJECTION INTRAMUSCULAR; INTRAVENOUS at 07:47

## 2025-03-05 RX ADMIN — EPHEDRINE SULFATE 2.5 MG: 5 INJECTION INTRAVENOUS at 08:28

## 2025-03-05 RX ADMIN — Medication 50 MCG: at 09:13

## 2025-03-05 RX ADMIN — PROTAMINE SULFATE 25 MG: 10 INJECTION, SOLUTION INTRAVENOUS at 09:23

## 2025-03-05 RX ADMIN — EPHEDRINE SULFATE 5 MG: 5 INJECTION INTRAVENOUS at 09:22

## 2025-03-05 RX ADMIN — HEPARIN SODIUM 5000 UNITS: 1000 INJECTION INTRAVENOUS; SUBCUTANEOUS at 09:04

## 2025-03-05 RX ADMIN — Medication 100 MCG: at 09:22

## 2025-03-05 RX ADMIN — SODIUM CHLORIDE: 0.9 INJECTION, SOLUTION INTRAVENOUS at 06:39

## 2025-03-05 RX ADMIN — EPHEDRINE SULFATE 5 MG: 5 INJECTION INTRAVENOUS at 09:08

## 2025-03-05 RX ADMIN — PROPOFOL 50 MG: 10 INJECTION, EMULSION INTRAVENOUS at 07:47

## 2025-03-05 RX ADMIN — FENTANYL CITRATE 50 MCG: 50 INJECTION, SOLUTION INTRAMUSCULAR; INTRAVENOUS at 07:47

## 2025-03-05 RX ADMIN — FENTANYL CITRATE 50 MCG: 50 INJECTION, SOLUTION INTRAMUSCULAR; INTRAVENOUS at 10:05

## 2025-03-05 RX ADMIN — Medication 100 MCG: at 09:00

## 2025-03-05 RX ADMIN — ACETAMINOPHEN 1000 MG: 500 TABLET ORAL at 11:10

## 2025-03-05 RX ADMIN — Medication 100 MG: at 07:47

## 2025-03-05 RX ADMIN — FENTANYL CITRATE 25 MCG: 50 INJECTION, SOLUTION INTRAMUSCULAR; INTRAVENOUS at 09:02

## 2025-03-05 RX ADMIN — Medication 50 MCG: at 08:23

## 2025-03-05 RX ADMIN — Medication 200 MCG: at 08:15

## 2025-03-05 RX ADMIN — Medication 50 MCG: at 09:15

## 2025-03-05 RX ADMIN — EPHEDRINE SULFATE 5 MG: 5 INJECTION INTRAVENOUS at 08:32

## 2025-03-05 RX ADMIN — WATER 2000 MG: 1 INJECTION INTRAMUSCULAR; INTRAVENOUS; SUBCUTANEOUS at 08:08

## 2025-03-05 RX ADMIN — PROPOFOL 100 MG: 10 INJECTION, EMULSION INTRAVENOUS at 07:55

## 2025-03-05 RX ADMIN — EPHEDRINE SULFATE 2.5 MG: 5 INJECTION INTRAVENOUS at 08:21

## 2025-03-05 RX ADMIN — Medication 100 MCG: at 08:08

## 2025-03-05 ASSESSMENT — PAIN SCALES - WONG BAKER
WONGBAKER_NUMERICALRESPONSE: HURTS A LITTLE BIT
WONGBAKER_NUMERICALRESPONSE: NO HURT

## 2025-03-05 ASSESSMENT — PAIN SCALES - GENERAL
PAINLEVEL_OUTOF10: 3
PAINLEVEL_OUTOF10: 3
PAINLEVEL_OUTOF10: 0
PAINLEVEL_OUTOF10: 3
PAINLEVEL_OUTOF10: 7

## 2025-03-05 ASSESSMENT — PAIN DESCRIPTION - DESCRIPTORS
DESCRIPTORS: ACHING;SORE
DESCRIPTORS: ACHING
DESCRIPTORS: SORE

## 2025-03-05 ASSESSMENT — PAIN - FUNCTIONAL ASSESSMENT
PAIN_FUNCTIONAL_ASSESSMENT: ACTIVITIES ARE NOT PREVENTED
PAIN_FUNCTIONAL_ASSESSMENT: 0-10
PAIN_FUNCTIONAL_ASSESSMENT: 0-10
PAIN_FUNCTIONAL_ASSESSMENT: ACTIVITIES ARE NOT PREVENTED
PAIN_FUNCTIONAL_ASSESSMENT: NONE - DENIES PAIN

## 2025-03-05 ASSESSMENT — ENCOUNTER SYMPTOMS: SHORTNESS OF BREATH: 1

## 2025-03-05 ASSESSMENT — LIFESTYLE VARIABLES: SMOKING_STATUS: 1

## 2025-03-05 ASSESSMENT — PAIN DESCRIPTION - ORIENTATION: ORIENTATION: LEFT;UPPER

## 2025-03-05 ASSESSMENT — PAIN DESCRIPTION - LOCATION: LOCATION: ARM

## 2025-03-05 ASSESSMENT — PAIN DESCRIPTION - ONSET: ONSET: ON-GOING

## 2025-03-05 ASSESSMENT — PAIN DESCRIPTION - FREQUENCY: FREQUENCY: CONTINUOUS

## 2025-03-05 ASSESSMENT — PAIN DESCRIPTION - PAIN TYPE: TYPE: SURGICAL PAIN

## 2025-03-05 NOTE — DISCHARGE INSTRUCTIONS
normally removed in 10-14 days post op.     SMOKING   If you smoke, STOP.  Smoking will cause early graft closure, other blockages, new heart attacks, and possibly even death.    SLEEPING   ? If you have trouble sleeping during the night, take your pain medication at bedtime.            CALL YOUR SURGEON IF YOU HAVE:   ? Rapid heart rate or “fluttering” in your chest   ? Fever over 101° F.   ? Weight gain or loss of 3 pounds overnight or 5      pounds in one week   ? Persistent nausea or vomiting        ? ANY NEW INCISION DRAINAGE   ? Increasing shortness of breath   ? Pain unrelieved by prescribed medication    OFFICE VISIT   ?    You should see your surgeon about 2 weeks after discharge from the hospital.   ?    Call your surgeon's office to make an appointment if you don't have a scheduled appointment ([phone]).   ?   Bring any questions you have so they may be addressed.      ?   BRING YOUR MEDICATION LIST and medications even over the counter medications to all your follow up apointments.   ?   Office Location:   [address] [place] [address]              EMERGENCIES   ? You should either call 911 or go to the Emergency Room to be evaluated if you need seen immediately   ?         Sudden, severe shortness of breath go to the Emergency Room    If you have questions regarding these instructions, please call our office  [phone].  We have an answering service 24 hours a day to get your calls to the ON Call Physician, but we are often in surgery and it takes us awhile to get back to you.

## 2025-03-05 NOTE — H&P
well-perfused.  Labs      Recent Results (from the past 24 hour(s))   EKG 12 Lead    Collection Time: 03/05/25  6:04 AM   Result Value Ref Range    Ventricular Rate 78 BPM    Atrial Rate 78 BPM    P-R Interval 162 ms    QRS Duration 86 ms    Q-T Interval 408 ms    QTc Calculation (Bazett) 465 ms    P Axis 33 degrees    R Axis -82 degrees    T Axis 110 degrees   Hemoglobin and Hematocrit    Collection Time: 03/05/25  6:10 AM   Result Value Ref Range    Hemoglobin 10.7 (L) 12.0 - 16.0 gm/dl    Hematocrit 35.2 (L) 37.0 - 47.0 %        Imaging/Diagnostics Last 24 Hours   No results found.    Assessment      Hospital Problems             Last Modified POA    * (Principal) End stage renal disease (HCC) 1/23/2025 Unknown    Encounter regarding vascular access for dialysis for end-stage renal disease (HCC) 3/5/2025 Yes       Plan   At this time we will be going to the operating room for permanent dialysis access construction on the left side.  She understands that this will either involve construction of a fistula which is a vein conduit or a graft which can be PTFE or a xenograft.  She also understands that these procedures do have risk including but not limited to bleeding, infection, hematoma, nerve damage, permanent neurologic disability, steal syndrome, failure of the access, need for new access, stroke, heart attack and death.  She understands that the most common problem is to have problems with the dialysis access at any time during the lifetime of the access.  We will see her in the operating room momentarily.    Consultations Ordered:  None    Electronically signed by Ajith Ruiz MD on 3/5/25 at 6:33 AM EST

## 2025-03-05 NOTE — PROGRESS NOTES
0955 pt arrived to PACU, awakens to voice and denies pain. Bruit and thrill present, thrill weak but palpable. Dressing CDI. VSS  1005 c/o pain 7/10, medicated with 50 mcg fentanyl  1010 pt states pain 3/10 and tolerable. VSS  1020 pt states pain 3/10 and tolerable. VSS  1025 pt states pain 3/10 and tolerable. Meets criteria for discharge from PACU, transported to [facility] in stable condition. Bruit and thrill present    
Called Dr. Ruiz's office and spoke with Antonietta and let her know per anesthesia patient needs H&H and EKG prior to surgery. She said she would get the order and let the patient know  
PAT call attempted, patient unavailable, left message to please call us back at your earliest convenience; [phone]   
Patient admitted to [facility] room 9 with no family at bedside. States her son duane will be here shortly. Bed in low position side rails up call light in reach. Patient denies questions at this time.     Duane [phone]  
Pt has met discharge criteria and states she is ready for discharge to home. IV removed, gauze and tape applied. Dressed in own clothes and personal belongings gathered. Discharge instructions (with opioid medication education information) given to pt and family; pt and family verbalized understanding of discharge instructions, prescriptions and follow up appointments. Pt transported to discharge lobby by [facility] staff.      
can prevent infections; signs and symptoms reviewed.  When discharged be sure you understand how to care for your wound and that you have the Dr./office phone # to call if you have any concerns or questions about your wound.     needed at discharge and someone over 18 to stay with you for 24 hours overnight (surgery may be cancelled if you don't have this)  Report to [facility] on 2nd floor  If you would become ill prior to surgery, please call the surgeon  May have a visitor with you, we request that you limit to 2 visitors in pre-op area  Masks are recommended but not required, new masks at entrance desk  Call [phone] for any questions

## 2025-03-05 NOTE — PROCEDURES
PROCEDURE NOTE  Date: 3/5/2025   Name: Lucero Brown  YOB: 1970    Procedures        EKG was completed and handed to RN

## 2025-03-05 NOTE — ANESTHESIA POSTPROCEDURE EVALUATION
Saturation:  Stable    Cardiovascular:  Stable    Hydration:  Adequate    PONV:  Stable    Post-op Pain:  Adequate analgesia    Post-op Assessment:  No apparent anesthetic complications    Additional Follow-Up / Treatment / Comment:  None    ELISA LR MD  March 5, 2025   11:20 AM      No notable events documented.

## 2025-03-05 NOTE — ANESTHESIA PRE PROCEDURE
Hot flashes, numbness in fingers    Fish-Derived Products Itching    Losartan Swelling       Problem List:    Patient Active Problem List   Diagnosis Code    Hypertension I10    Eczema L30.9    Acute kidney injury superimposed on chronic kidney disease N17.9, N18.9    Dyspnea R06.00    Obesity E66.9    History of URI (upper respiratory infection) Z87.09    Hyponatremia E87.1    Nicotine dependence F17.200    Angina, class II I20.9    Abdominal pain R10.9    Polysubstance abuse (HCC) F19.10    Hypertensive emergency I16.1    Non compliance w medication regimen Z91.148    Acute diverticulitis K57.92    End stage renal disease (HCC) N18.6    Encounter regarding vascular access for dialysis for end-stage renal disease (HCC) N18.6, Z99.2       Past Medical History:        Diagnosis Date    Aneurysm 2017    Anxiety     Chronic kidney disease     Dental abscess 2021    right lower jaw/tooth    Eczema     all her life    GERD (gastroesophageal reflux disease)     Hypertension     Mastoiditis     Thyroid disease     having thyroid removed 14       Past Surgical History:        Procedure Laterality Date    CARDIAC SURGERY       SECTION  86 87 90    ENDOSCOPY, COLON, DIAGNOSTIC      HEEL SPUR SURGERY Right     [place] EGD TRANSORAL BIOPSY SINGLE/MULTIPLE Left 2018    EGD BIOPSY performed by Meena Zendejas MD at [facility]    SALPINGECTOMY      tubal pregnancy    THYROIDECTOMY, PARTIAL  2011    TUBAL LIGATION  2010    TUNNELED VENOUS PORT PLACEMENT Left     mediport in chest       Social History:    Social History     Tobacco Use    Smoking status: Some Days     Current packs/day: 0.25     Average packs/day: 0.3 packs/day for 33.0 years (8.3 ttl pk-yrs)     Types: Cigarettes    Smokeless tobacco: Never   Substance Use Topics    Alcohol use: Not Currently                                Ready to quit: Not Answered  Counseling given: Not Answered      Vital Signs (Current):   Vitals:    25 1148

## 2025-03-05 NOTE — OP NOTE
fashion ligating all branches with 4-0 silk ties on the vein side and ligaclips on the patient's side.  The median antebrachial cutaneous nerve was visualized and preserved.  The vein was dissected for as far cephalad as possible before a second incision in the proximal brachium to the axillary crease was performed.  Bovie cautery was used to dissect down to and through the fascia and the vein was identified and dissected in a circumferential fashion preserving all nerve structures.  All branches were ligated with 4-0 silk on the vein side and either 4-0 silk or ligaclips on the patient's side.  Once the vein was completely freed the artery was then identified in the distal brachial incision in the distal half of that incision.  This was done by palpation and Bovie cautery was used to dissect overlying the artery lateral to the median nerve to preserve the nerve.  The artery was dissected circumferentially for at least 3 cm in length.  The patient then had a tunnel created from distal to proximal using a 180 degree Bekah Weck tunneler with an 8 mm bullet.  This was brought out of the axillary incision and the 8 mm bullet was removed and replaced with a 4 mm bullet.  The vein was then transected distally at the distal brachium and pulled from its anatomic tunnel.  Heparin saline was used to pressurize the vein and make certain that there were no twists within the vein.  The vein was marked on its anterior surface before tying it onto the Bekah Weck tunneler and pulling it in a retrograde fashion through the tunnel.  There was enough vein to easily meet the length of the vein needed.  The patient was then given a systemic dose of intravenous heparin for anticoagulation and 3 minutes were allowed to elapse.  The brachial artery was clamped both proximally and distally before it was opened with an 11 blade and Vo scissors to make a longitudinal arteriotomy measuring 4 mm in length.  The vein was then anastomosed to

## 2025-03-17 ENCOUNTER — HOSPITAL ENCOUNTER (EMERGENCY)
Age: 55
Discharge: HOME | End: 2025-03-17
Attending: EMERGENCY MEDICINE
Payer: COMMERCIAL

## 2025-03-17 ENCOUNTER — APPOINTMENT (OUTPATIENT)
Dept: INTERVENTIONAL RADIOLOGY/VASCULAR | Age: 55
End: 2025-03-17
Payer: COMMERCIAL

## 2025-03-17 VITALS
HEART RATE: 74 BPM | WEIGHT: 155 LBS | DIASTOLIC BLOOD PRESSURE: 107 MMHG | TEMPERATURE: 98 F | OXYGEN SATURATION: 99 % | BODY MASS INDEX: 24.28 KG/M2 | SYSTOLIC BLOOD PRESSURE: 169 MMHG | RESPIRATION RATE: 21 BRPM

## 2025-03-17 DIAGNOSIS — L76.34 SEROMA OF SKIN OR SUBCUTANEOUS TISSUE AFTER NON-DERMATOLOGIC PROCEDURE: Primary | ICD-10-CM

## 2025-03-17 LAB
ALBUMIN SERPL BCG-MCNC: 3.5 G/DL (ref 3.4–4.9)
ALP SERPL-CCNC: 71 U/L (ref 35–104)
ALT SERPL W/O P-5'-P-CCNC: < 5 U/L (ref 10–35)
ANION GAP SERPL CALC-SCNC: 13 MEQ/L (ref 8–16)
AST SERPL-CCNC: 16 U/L (ref 10–35)
BASOPHILS ABSOLUTE: 0 THOU/MM3 (ref 0–0.1)
BASOPHILS NFR BLD AUTO: 0.7 %
BILIRUB SERPL-MCNC: 0.3 MG/DL (ref 0.3–1.2)
BUN SERPL-MCNC: 52 MG/DL (ref 8–23)
CALCIUM SERPL-MCNC: 8.9 MG/DL (ref 8.6–10)
CHLORIDE SERPL-SCNC: 101 MEQ/L (ref 98–111)
CO2 SERPL-SCNC: 22 MEQ/L (ref 22–29)
CREAT SERPL-MCNC: 6.8 MG/DL (ref 0.5–0.9)
DEPRECATED RDW RBC AUTO: 55.2 FL (ref 35–45)
EOSINOPHIL NFR BLD AUTO: 3.4 %
EOSINOPHILS ABSOLUTE: 0.1 THOU/MM3 (ref 0–0.4)
ERYTHROCYTE [DISTWIDTH] IN BLOOD BY AUTOMATED COUNT: 16.3 % (ref 11.5–14.5)
GFR SERPL CREATININE-BSD FRML MDRD: 7 ML/MIN/1.73M2
GLUCOSE SERPL-MCNC: 75 MG/DL (ref 74–109)
HCT VFR BLD AUTO: 31.6 % (ref 37–47)
HGB BLD-MCNC: 9.6 GM/DL (ref 12–16)
IMM GRANULOCYTES # BLD AUTO: 0 THOU/MM3 (ref 0–0.07)
IMM GRANULOCYTES NFR BLD AUTO: 0 %
INR PPP: 1 (ref 0.85–1.13)
LACTIC ACID, SEPSIS: 1.2 MMOL/L (ref 0.5–1.9)
LYMPHOCYTES ABSOLUTE: 0.7 THOU/MM3 (ref 1–4.8)
LYMPHOCYTES NFR BLD AUTO: 22.7 %
MCH RBC QN AUTO: 28.6 PG (ref 26–33)
MCHC RBC AUTO-ENTMCNC: 30.4 GM/DL (ref 32.2–35.5)
MCV RBC AUTO: 94 FL (ref 81–99)
MONOCYTES ABSOLUTE: 0.5 THOU/MM3 (ref 0.4–1.3)
MONOCYTES NFR BLD AUTO: 16.2 %
NEUTROPHILS ABSOLUTE: 1.7 THOU/MM3 (ref 1.8–7.7)
NEUTROPHILS NFR BLD AUTO: 57 %
NRBC BLD AUTO-RTO: 0 /100 WBC
OSMOLALITY SERPL CALC.SUM OF ELEC: 284.7 MOSMOL/KG (ref 275–300)
PLATELET # BLD AUTO: 171 THOU/MM3 (ref 130–400)
PMV BLD AUTO: 11.9 FL (ref 9.4–12.4)
POTASSIUM SERPL-SCNC: 5 MEQ/L (ref 3.5–5.2)
PROT SERPL-MCNC: 6.5 G/DL (ref 6.4–8.3)
RBC # BLD AUTO: 3.36 MILL/MM3 (ref 4.2–5.4)
SODIUM SERPL-SCNC: 136 MEQ/L (ref 135–145)
WBC # BLD AUTO: 2.9 THOU/MM3 (ref 4.8–10.8)

## 2025-03-17 PROCEDURE — 99284 EMERGENCY DEPT VISIT MOD MDM: CPT

## 2025-03-17 PROCEDURE — 96374 THER/PROPH/DIAG INJ IV PUSH: CPT

## 2025-03-17 PROCEDURE — 93931 UPPER EXTREMITY STUDY: CPT

## 2025-03-17 PROCEDURE — 83605 ASSAY OF LACTIC ACID: CPT

## 2025-03-17 PROCEDURE — 36415 COLL VENOUS BLD VENIPUNCTURE: CPT

## 2025-03-17 PROCEDURE — 96375 TX/PRO/DX INJ NEW DRUG ADDON: CPT

## 2025-03-17 PROCEDURE — 6360000002 HC RX W HCPCS

## 2025-03-17 PROCEDURE — 80053 COMPREHEN METABOLIC PANEL: CPT

## 2025-03-17 PROCEDURE — 87040 BLOOD CULTURE FOR BACTERIA: CPT

## 2025-03-17 PROCEDURE — 85610 PROTHROMBIN TIME: CPT

## 2025-03-17 PROCEDURE — 85025 COMPLETE CBC W/AUTO DIFF WBC: CPT

## 2025-03-17 RX ORDER — ONDANSETRON 2 MG/ML
4 INJECTION INTRAMUSCULAR; INTRAVENOUS ONCE
Status: COMPLETED | OUTPATIENT
Start: 2025-03-17 | End: 2025-03-17

## 2025-03-17 RX ORDER — FENTANYL CITRATE 50 UG/ML
50 INJECTION, SOLUTION INTRAMUSCULAR; INTRAVENOUS ONCE
Status: COMPLETED | OUTPATIENT
Start: 2025-03-17 | End: 2025-03-17

## 2025-03-17 RX ORDER — HYDROCODONE BITARTRATE AND ACETAMINOPHEN 5; 325 MG/1; MG/1
1 TABLET ORAL EVERY 6 HOURS PRN
Qty: 10 TABLET | Refills: 0 | Status: SHIPPED | OUTPATIENT
Start: 2025-03-17 | End: 2025-03-20

## 2025-03-17 RX ADMIN — ONDANSETRON 4 MG: 2 INJECTION, SOLUTION INTRAMUSCULAR; INTRAVENOUS at 11:43

## 2025-03-17 RX ADMIN — FENTANYL CITRATE 50 MCG: 50 INJECTION, SOLUTION INTRAMUSCULAR; INTRAVENOUS at 11:43

## 2025-03-17 ASSESSMENT — PAIN - FUNCTIONAL ASSESSMENT: PAIN_FUNCTIONAL_ASSESSMENT: 0-10

## 2025-03-17 ASSESSMENT — PAIN SCALES - GENERAL: PAINLEVEL_OUTOF10: 6

## 2025-03-17 NOTE — ED TRIAGE NOTES
Pt to ED with left arm pain. States that she had fistulas placed 3/6/25 for dialysis and has noticed a hard \"lump\" has formed a few days ago. States that her arm needs to be elevated to alleviate the pain. There does appears to be swelling next to the incision site. No redness or warmth coming from the area. Pt states that she has normal sensation below the incision site. States that the pain will go up her arm into her neck. States that she has been nauseous and having hot and cold chills.

## 2025-03-17 NOTE — ED PROVIDER NOTES
PATIENT NAME: Lucero Brown  MRN: [id number]  : 1970  HELTON: 3/17/2025    I performed a history and physical examination of the patient and discussed management with the Resident. I reviewed the Resident's note and agree with the documented findings and plan of care. Any areas of disagreement are noted on the chart. I was personally present for the key portions of any procedures and have documented in the chart those procedures where I was not present during the key portions. I have reviewed the emergency nurses triage note and agree with the chief complaint, past medical history, past surgical history, allergies, medications, social and family history as documented unless otherwise noted below.    MEDICAL DEDISION MAKING (MDM)     Lucero Brown is a 55 y.o. female presents with left arm swelling and pain since over last 4 days duration    Her PMH is remarkable for ESRD on hemodialysis.  Patient's status post left brachiobasilic AV fistula creation 3/5/2025.  Patient denies fever or chills    Physical examination: AVSS.  Heart and lungs are unremarkable.  Soft abdomen without tenderness.  Neurologically intact.  Left upper arm incision clean dry and intact.  Good thrill and bruit at AV fistula site.  At distal upper arm above medial condyle area there is a 5 x 5 cm swelling with tenderness.  No thrill or bruit.  Left hand has normal cap refill <2 seconds.  Left radial pulsation is 2+.    Labs are reassuring.  H/H at her base line.  Potassium 5.0.  Sodium 136 creatinine 6.8.    Left upper extremity arterial duplex reveals no arterial stenosis throughout the left upper extremity.  The venous structures appear patent. There is a fluid collection adjacent to the basilic vein which may be postsurgical in nature and represent a seroma.    Patient is reassured.  Since arterial duplex confirms functional fistula with no stenosis or thrombosis, and the fluid collection is near the basilic vein likely a postop seroma, 
  This transcription was electronically signed. Parts of this transcriptions may have been dictated by use of voice recognition software and electronically transcribed. The transcription may contain errors not detected in proofreading. Please refer to my supervising physician's documentation if my documentation differs.    Electronically Signed: Lobo Pulido MD, 03/17/25, 8:17 PM

## 2025-03-17 NOTE — ED NOTES
Pt back from vascular. Pt handoff received from RED Escudero. No acute distress noted at this time. Call light in reach. Pain noted per patient.

## 2025-03-17 NOTE — DISCHARGE INSTRUCTIONS
Use Norco for pain control at home.    Make sure you attend your appointment with vascular surgery on the 20th and advise that you are diagnosed with a seroma.

## 2025-03-17 NOTE — DISCHARGE INSTR - COC
Continuity of Care Form    Patient Name: Lucero Brown   :  1970  MRN:  [id number]    Admit date:  3/17/2025  Discharge date:  ***    Code Status Order: Prior   Advance Directives:     Admitting Physician:  No admitting provider for patient encounter.  PCP: Raiaz Castillo, APRN - CNP    Discharging Nurse: ***  Discharging Hospital Unit/Room#: 029A  Discharging Unit Phone Number: ***    Emergency Contact:   Extended Emergency Contact Information  Primary Emergency Contact: Clint Lujan  Address: [address] [place] [address]  Home Phone: [phone]  Relation: Child  Hearing or visual needs: None  Other needs: None  Preferred language: English   needed? No  Secondary Emergency Contact: Mary Beth Lujan  Home Phone: [phone]  Relation: Child  Preferred language: English   needed? No    Past Surgical History:  Past Surgical History:   Procedure Laterality Date    CARDIAC SURGERY       SECTION  86 87 90    DIALYSIS FISTULA CREATION Left 3/5/2025    LEFT AV FISTULA CREATION performed by Ajith Ruiz MD at [facility] OR    ENDOSCOPY, COLON, DIAGNOSTIC      HEEL SPUR SURGERY Right     [place] EGD TRANSORAL BIOPSY SINGLE/MULTIPLE Left 2018    EGD BIOPSY performed by Meena Zendejas MD at [facility]    SALPINGECTOMY      tubal pregnancy    THYROIDECTOMY, PARTIAL  2011    TUBAL LIGATION  2010    TUNNELED VENOUS PORT PLACEMENT Left     mediport in chest       Immunization History:     There is no immunization history on file for this patient.    Active Problems:  Patient Active Problem List   Diagnosis Code    Hypertension I10    Eczema L30.9    Acute kidney injury superimposed on chronic kidney disease N17.9, N18.9    Dyspnea R06.00    Obesity E66.9    History of URI (upper respiratory infection) Z87.09    Hyponatremia E87.1    Nicotine dependence F17.200    Angina, class II I20.9    Abdominal pain R10.9    Polysubstance

## 2025-03-20 ENCOUNTER — OFFICE VISIT (OUTPATIENT)
Age: 55
End: 2025-03-20

## 2025-03-20 VITALS
HEART RATE: 85 BPM | BODY MASS INDEX: 25.55 KG/M2 | WEIGHT: 162.8 LBS | DIASTOLIC BLOOD PRESSURE: 108 MMHG | HEIGHT: 67 IN | SYSTOLIC BLOOD PRESSURE: 169 MMHG

## 2025-03-20 DIAGNOSIS — Z98.890 S/P ARTERIOVENOUS (AV) FISTULA CREATION: Primary | ICD-10-CM

## 2025-03-20 PROCEDURE — 99024 POSTOP FOLLOW-UP VISIT: CPT | Performed by: PHYSICIAN ASSISTANT

## 2025-03-20 NOTE — PROGRESS NOTES
medications, and I advised her to follow with provider, who prescribes blood pressure medication, to get better control.    Thank you for allowing us to be involved in the patient's care.    Electronically by Daniele Abraham PA-C  on 3/20/2025 at 10:57 AM

## 2025-03-22 LAB
BACTERIA BLD AEROBE CULT: NORMAL
BACTERIA BLD AEROBE CULT: NORMAL

## 2025-04-08 ENCOUNTER — OFFICE VISIT (OUTPATIENT)
Age: 55
End: 2025-04-08

## 2025-04-08 ENCOUNTER — TELEPHONE (OUTPATIENT)
Age: 55
End: 2025-04-08

## 2025-04-08 VITALS
HEIGHT: 67 IN | SYSTOLIC BLOOD PRESSURE: 128 MMHG | WEIGHT: 164.2 LBS | BODY MASS INDEX: 25.77 KG/M2 | DIASTOLIC BLOOD PRESSURE: 89 MMHG | HEART RATE: 87 BPM

## 2025-04-08 DIAGNOSIS — T82.590D MECHANICAL COMPLICATION OF ARTERIOVENOUS FISTULA SURGICALLY CREATED, SUBSEQUENT ENCOUNTER: ICD-10-CM

## 2025-04-08 DIAGNOSIS — Z98.890 S/P ARTERIOVENOUS (AV) FISTULA CREATION: Primary | ICD-10-CM

## 2025-04-08 DIAGNOSIS — N18.6 END STAGE RENAL DISEASE (HCC): ICD-10-CM

## 2025-04-08 PROCEDURE — 99024 POSTOP FOLLOW-UP VISIT: CPT | Performed by: SURGERY

## 2025-04-08 NOTE — PROGRESS NOTES
[facility] [place] [facility]  [facility] VASCULAR SURGERY  [address], [address]  [facility] [address]  Dept: [phone]  Loc: [phone]     Patient: Lucero Brown  : 1970  MRN: [id number]  DOS: 2025    Referring provider:  No ref. provider found         HPI:  Lucero Brown is a 55 y.o. female who returns to the office for follow-up on her basilic vein transposition.  She is complaining of left upper extremity swelling which is the ipsilateral extremity.  It is not improved and may have become worse.  The construction of this transposition was done on .  Past Medical History:   Diagnosis Date    Aneurysm 2017    Anxiety     Chronic kidney disease     Dental abscess 2021    right lower jaw/tooth    Eczema     all her life    GERD (gastroesophageal reflux disease)     Hypertension     Mastoiditis     Thyroid disease     having thyroid removed 14     Family History   Problem Relation Age of Onset    Cancer Mother     Cancer Maternal Grandfather         N/a    Diabetes Paternal Grandfather     Heart Disease Paternal Grandfather       Social History     Socioeconomic History    Marital status: Single     Spouse name: Not on file    Number of children: 3    Years of education: 12    Highest education level: Not on file   Occupational History    Occupation: unemployed   Tobacco Use    Smoking status: Some Days     Current packs/day: 0.25     Average packs/day: 0.3 packs/day for 33.0 years (8.3 ttl pk-yrs)     Types: Cigarettes    Smokeless tobacco: Never   Vaping Use    Vaping status: Never Used   Substance and Sexual Activity    Alcohol use: Not Currently    Drug use: Not Currently     Types: Cocaine    Sexual activity: Yes     Partners: Male   Other Topics Concern    Not on file   Social History Narrative    Not on file     Social Drivers of Health     Financial Resource Strain: Not on file   Food Insecurity: Not on file (2023)

## 2025-04-08 NOTE — TELEPHONE ENCOUNTER
Pt to be scheduled for IR fistulagram with Dr. Lynn. Call out to IR, but they left for the day, so I left a message. Looking to schedule on 4/21/25 at 230pm. Pt aware office will call with definitive date/time. Instructions given to pt following OV.

## 2025-04-10 NOTE — TELEPHONE ENCOUNTER
Lucero Brown is scheduled for LUE IR Fistluagram with Dr Lynn on 4/22/25 at 1:00. Pt agreed to date/time.    Surgery instructions are as follows:  - Arrive to Same Day Surgery at [facility] at 11:00 am  - NPO after midnight the night before surgery  - No medication holds  - Will need  upon discharge     All instructions discussed with pt, she verbalized understanding. She denied questions or concerns at this time.     Primary insurance is [employer]. Will obtain prior authorization as necessary.

## 2025-04-14 ENCOUNTER — APPOINTMENT (OUTPATIENT)
Dept: CT IMAGING | Age: 55
End: 2025-04-14
Payer: COMMERCIAL

## 2025-04-14 ENCOUNTER — HOSPITAL ENCOUNTER (OUTPATIENT)
Age: 55
Setting detail: OBSERVATION
Discharge: HOME | End: 2025-04-15
Attending: EMERGENCY MEDICINE | Admitting: STUDENT IN AN ORGANIZED HEALTH CARE EDUCATION/TRAINING PROGRAM
Payer: COMMERCIAL

## 2025-04-14 ENCOUNTER — APPOINTMENT (OUTPATIENT)
Dept: INTERVENTIONAL RADIOLOGY/VASCULAR | Age: 55
End: 2025-04-14
Attending: SURGERY
Payer: COMMERCIAL

## 2025-04-14 DIAGNOSIS — M79.89 LEFT UPPER EXTREMITY SWELLING: ICD-10-CM

## 2025-04-14 DIAGNOSIS — T82.9XXA COMPLICATION OF ARTERIOVENOUS DIALYSIS FISTULA, INITIAL ENCOUNTER: Primary | ICD-10-CM

## 2025-04-14 PROBLEM — I50.31 ACUTE DIASTOLIC HEART FAILURE (HCC): Status: ACTIVE | Noted: 2025-04-14

## 2025-04-14 PROBLEM — N18.9 ANEMIA IN CHRONIC KIDNEY DISEASE: Status: ACTIVE | Noted: 2024-11-26

## 2025-04-14 PROBLEM — N18.4 STAGE 4 CHRONIC KIDNEY DISEASE (HCC): Status: ACTIVE | Noted: 2023-10-19

## 2025-04-14 PROBLEM — D63.1 ANEMIA IN CHRONIC KIDNEY DISEASE: Status: ACTIVE | Noted: 2024-11-26

## 2025-04-14 PROBLEM — J18.9 PNEUMONIA: Status: RESOLVED | Noted: 2025-04-14 | Resolved: 2025-04-14

## 2025-04-14 PROBLEM — F41.1 GENERALIZED ANXIETY DISORDER: Status: ACTIVE | Noted: 2021-04-23

## 2025-04-14 PROBLEM — K57.92 ACUTE DIVERTICULITIS: Status: RESOLVED | Noted: 2023-11-20 | Resolved: 2025-04-14

## 2025-04-14 PROBLEM — I31.39 PERICARDIAL EFFUSION: Status: RESOLVED | Noted: 2025-04-14 | Resolved: 2025-04-14

## 2025-04-14 PROBLEM — F14.91 HISTORY OF COCAINE USE: Status: ACTIVE | Noted: 2025-04-14

## 2025-04-14 PROBLEM — F14.90 COCAINE USE: Status: RESOLVED | Noted: 2025-04-14 | Resolved: 2025-04-14

## 2025-04-14 PROBLEM — E78.5 HYPERLIPIDEMIA: Status: ACTIVE | Noted: 2021-04-23

## 2025-04-14 PROBLEM — I50.30 DIASTOLIC CONGESTIVE HEART FAILURE (HCC): Status: RESOLVED | Noted: 2025-04-14 | Resolved: 2025-04-14

## 2025-04-14 PROBLEM — D50.9 IRON DEFICIENCY ANEMIA, UNSPECIFIED: Status: RESOLVED | Noted: 2024-11-26 | Resolved: 2025-04-14

## 2025-04-14 PROBLEM — Z86.79 HISTORY OF ACUTE HEART FAILURE: Status: ACTIVE | Noted: 2025-04-14

## 2025-04-14 PROBLEM — M19.011 ARTHROPATHY OF RIGHT SHOULDER: Status: RESOLVED | Noted: 2023-10-19 | Resolved: 2025-04-14

## 2025-04-14 PROBLEM — E44.1 MILD PROTEIN-CALORIE MALNUTRITION: Status: RESOLVED | Noted: 2024-12-17 | Resolved: 2025-04-14

## 2025-04-14 PROBLEM — I38 ENDOCARDITIS: Status: RESOLVED | Noted: 2025-04-14 | Resolved: 2025-04-14

## 2025-04-14 PROBLEM — I31.4 CARDIAC TAMPONADE: Status: RESOLVED | Noted: 2025-04-14 | Resolved: 2025-04-14

## 2025-04-14 PROBLEM — J01.90 ACUTE SINUSITIS: Status: RESOLVED | Noted: 2020-08-27 | Resolved: 2025-04-14

## 2025-04-14 PROBLEM — R10.9 ABDOMINAL PAIN: Status: RESOLVED | Noted: 2018-06-30 | Resolved: 2025-04-14

## 2025-04-14 PROBLEM — I50.31 ACUTE DIASTOLIC HEART FAILURE (HCC): Status: RESOLVED | Noted: 2025-04-14 | Resolved: 2025-04-14

## 2025-04-14 PROBLEM — F43.22 ADJUSTMENT DISORDER WITH ANXIOUS MOOD: Status: ACTIVE | Noted: 2024-12-16

## 2025-04-14 PROBLEM — I38 ENDOCARDITIS: Status: ACTIVE | Noted: 2025-04-14

## 2025-04-14 PROBLEM — R22.0 SWELLING OF FACE: Status: RESOLVED | Noted: 2025-04-14 | Resolved: 2025-04-14

## 2025-04-14 PROBLEM — Z98.890: Status: RESOLVED | Noted: 2025-04-14 | Resolved: 2025-04-14

## 2025-04-14 PROBLEM — T78.2XXA ANAPHYLACTIC SHOCK, UNSPECIFIED, INITIAL ENCOUNTER: Status: RESOLVED | Noted: 2024-11-26 | Resolved: 2025-04-14

## 2025-04-14 PROBLEM — E89.0 POSTPROCEDURAL HYPOTHYROIDISM: Status: ACTIVE | Noted: 2024-11-25

## 2025-04-14 PROBLEM — I25.10 CORONARY ATHEROSCLEROSIS: Status: RESOLVED | Noted: 2025-04-14 | Resolved: 2025-04-14

## 2025-04-14 PROBLEM — G25.71 AKATHISIA: Status: RESOLVED | Noted: 2021-04-23 | Resolved: 2025-04-14

## 2025-04-14 PROBLEM — I21.9 MYOCARDIAL INFARCTION (HCC): Status: RESOLVED | Noted: 2025-04-14 | Resolved: 2025-04-14

## 2025-04-14 PROBLEM — F14.90 COCAINE USE: Status: ACTIVE | Noted: 2025-04-14

## 2025-04-14 PROBLEM — I16.1 HYPERTENSIVE EMERGENCY: Status: RESOLVED | Noted: 2021-04-01 | Resolved: 2025-04-14

## 2025-04-14 PROBLEM — R13.10 DYSPHAGIA: Status: RESOLVED | Noted: 2025-04-14 | Resolved: 2025-04-14

## 2025-04-14 PROBLEM — K57.92 DIVERTICULITIS: Status: RESOLVED | Noted: 2025-04-14 | Resolved: 2025-04-14

## 2025-04-14 PROBLEM — N25.81 SECONDARY HYPERPARATHYROIDISM OF RENAL ORIGIN: Status: ACTIVE | Noted: 2025-01-13

## 2025-04-14 PROBLEM — F19.10 POLYSUBSTANCE ABUSE (HCC): Status: RESOLVED | Noted: 2018-10-13 | Resolved: 2025-04-14

## 2025-04-14 LAB
ANION GAP SERPL CALC-SCNC: 13 MEQ/L (ref 8–16)
BASOPHILS ABSOLUTE: 0 THOU/MM3 (ref 0–0.1)
BASOPHILS NFR BLD AUTO: 0.4 %
BUN SERPL-MCNC: 52 MG/DL (ref 8–23)
CALCIUM SERPL-MCNC: 8.2 MG/DL (ref 8.6–10)
CHLORIDE SERPL-SCNC: 100 MEQ/L (ref 98–111)
CO2 SERPL-SCNC: 23 MEQ/L (ref 22–29)
CREAT SERPL-MCNC: 7.7 MG/DL (ref 0.5–0.9)
DEPRECATED RDW RBC AUTO: 54.4 FL (ref 35–45)
ECHO BSA: 1.89 M2
EKG ATRIAL RATE: 85 BPM
EKG P AXIS: 38 DEGREES
EKG P-R INTERVAL: 156 MS
EKG Q-T INTERVAL: 384 MS
EKG QRS DURATION: 84 MS
EKG QTC CALCULATION (BAZETT): 456 MS
EKG R AXIS: -84 DEGREES
EKG T AXIS: 100 DEGREES
EKG VENTRICULAR RATE: 85 BPM
EOSINOPHIL NFR BLD AUTO: 1.6 %
EOSINOPHILS ABSOLUTE: 0.1 THOU/MM3 (ref 0–0.4)
ERYTHROCYTE [DISTWIDTH] IN BLOOD BY AUTOMATED COUNT: 16.2 % (ref 11.5–14.5)
GFR SERPL CREATININE-BSD FRML MDRD: 6 ML/MIN/1.73M2
GLUCOSE SERPL-MCNC: 101 MG/DL (ref 74–109)
HCT VFR BLD AUTO: 32.5 % (ref 37–47)
HGB BLD-MCNC: 10 GM/DL (ref 12–16)
IMM GRANULOCYTES # BLD AUTO: 0.01 THOU/MM3 (ref 0–0.07)
IMM GRANULOCYTES NFR BLD AUTO: 0.2 %
LYMPHOCYTES ABSOLUTE: 0.6 THOU/MM3 (ref 1–4.8)
LYMPHOCYTES NFR BLD AUTO: 14.3 %
MCH RBC QN AUTO: 28.6 PG (ref 26–33)
MCHC RBC AUTO-ENTMCNC: 30.8 GM/DL (ref 32.2–35.5)
MCV RBC AUTO: 92.9 FL (ref 81–99)
MONOCYTES ABSOLUTE: 0.6 THOU/MM3 (ref 0.4–1.3)
MONOCYTES NFR BLD AUTO: 13.8 %
NEUTROPHILS ABSOLUTE: 3.1 THOU/MM3 (ref 1.8–7.7)
NEUTROPHILS NFR BLD AUTO: 69.7 %
NRBC BLD AUTO-RTO: 0 /100 WBC
OSMOLALITY SERPL CALC.SUM OF ELEC: 286.1 MOSMOL/KG (ref 275–300)
PLATELET # BLD AUTO: 122 THOU/MM3 (ref 130–400)
PMV BLD AUTO: 11.4 FL (ref 9.4–12.4)
POTASSIUM SERPL-SCNC: 5.1 MEQ/L (ref 3.5–5.2)
RBC # BLD AUTO: 3.5 MILL/MM3 (ref 4.2–5.4)
SODIUM SERPL-SCNC: 136 MEQ/L (ref 135–145)
WBC # BLD AUTO: 4.5 THOU/MM3 (ref 4.8–10.8)

## 2025-04-14 PROCEDURE — 93931 UPPER EXTREMITY STUDY: CPT

## 2025-04-14 PROCEDURE — 6370000000 HC RX 637 (ALT 250 FOR IP): Performed by: PHYSICIAN ASSISTANT

## 2025-04-14 PROCEDURE — 6370000000 HC RX 637 (ALT 250 FOR IP)

## 2025-04-14 PROCEDURE — 99285 EMERGENCY DEPT VISIT HI MDM: CPT

## 2025-04-14 PROCEDURE — APPSS60 APP SPLIT SHARED TIME 46-60 MINUTES: Performed by: PHYSICIAN ASSISTANT

## 2025-04-14 PROCEDURE — G0378 HOSPITAL OBSERVATION PER HR: HCPCS

## 2025-04-14 PROCEDURE — 80048 BASIC METABOLIC PNL TOTAL CA: CPT

## 2025-04-14 PROCEDURE — 96374 THER/PROPH/DIAG INJ IV PUSH: CPT

## 2025-04-14 PROCEDURE — 73200 CT UPPER EXTREMITY W/O DYE: CPT

## 2025-04-14 PROCEDURE — 6360000002 HC RX W HCPCS

## 2025-04-14 PROCEDURE — 36415 COLL VENOUS BLD VENIPUNCTURE: CPT

## 2025-04-14 PROCEDURE — 85025 COMPLETE CBC W/AUTO DIFF WBC: CPT

## 2025-04-14 PROCEDURE — 99223 1ST HOSP IP/OBS HIGH 75: CPT

## 2025-04-14 PROCEDURE — 2500000003 HC RX 250 WO HCPCS

## 2025-04-14 PROCEDURE — 96372 THER/PROPH/DIAG INJ SC/IM: CPT

## 2025-04-14 PROCEDURE — 93005 ELECTROCARDIOGRAM TRACING: CPT | Performed by: EMERGENCY MEDICINE

## 2025-04-14 RX ORDER — CARVEDILOL 6.25 MG/1
12.5 TABLET ORAL 2 TIMES DAILY WITH MEALS
Status: DISCONTINUED | OUTPATIENT
Start: 2025-04-14 | End: 2025-04-15 | Stop reason: HOSPADM

## 2025-04-14 RX ORDER — ACETAMINOPHEN 650 MG/1
650 SUPPOSITORY RECTAL EVERY 6 HOURS PRN
Status: DISCONTINUED | OUTPATIENT
Start: 2025-04-14 | End: 2025-04-15 | Stop reason: HOSPADM

## 2025-04-14 RX ORDER — HYDROCODONE BITARTRATE AND ACETAMINOPHEN 5; 325 MG/1; MG/1
1 TABLET ORAL EVERY 6 HOURS PRN
Refills: 0 | Status: DISCONTINUED | OUTPATIENT
Start: 2025-04-14 | End: 2025-04-15 | Stop reason: HOSPADM

## 2025-04-14 RX ORDER — ONDANSETRON 4 MG/1
4 TABLET, ORALLY DISINTEGRATING ORAL EVERY 8 HOURS PRN
Status: DISCONTINUED | OUTPATIENT
Start: 2025-04-14 | End: 2025-04-15 | Stop reason: HOSPADM

## 2025-04-14 RX ORDER — SODIUM CHLORIDE 9 MG/ML
INJECTION, SOLUTION INTRAVENOUS PRN
Status: DISCONTINUED | OUTPATIENT
Start: 2025-04-14 | End: 2025-04-15 | Stop reason: HOSPADM

## 2025-04-14 RX ORDER — SODIUM CHLORIDE 0.9 % (FLUSH) 0.9 %
5-40 SYRINGE (ML) INJECTION EVERY 12 HOURS SCHEDULED
Status: DISCONTINUED | OUTPATIENT
Start: 2025-04-14 | End: 2025-04-15 | Stop reason: HOSPADM

## 2025-04-14 RX ORDER — POLYETHYLENE GLYCOL 3350 17 G/17G
17 POWDER, FOR SOLUTION ORAL DAILY PRN
Status: DISCONTINUED | OUTPATIENT
Start: 2025-04-14 | End: 2025-04-15 | Stop reason: HOSPADM

## 2025-04-14 RX ORDER — ATORVASTATIN CALCIUM 20 MG/1
20 TABLET, FILM COATED ORAL DAILY
Status: DISCONTINUED | OUTPATIENT
Start: 2025-04-14 | End: 2025-04-15 | Stop reason: HOSPADM

## 2025-04-14 RX ORDER — CLONIDINE HYDROCHLORIDE 0.1 MG/1
0.1 TABLET ORAL 2 TIMES DAILY
Status: DISCONTINUED | OUTPATIENT
Start: 2025-04-14 | End: 2025-04-15 | Stop reason: HOSPADM

## 2025-04-14 RX ORDER — DOXAZOSIN 2 MG/1
2 TABLET ORAL DAILY
Status: DISCONTINUED | OUTPATIENT
Start: 2025-04-14 | End: 2025-04-15 | Stop reason: HOSPADM

## 2025-04-14 RX ORDER — MORPHINE SULFATE 4 MG/ML
4 INJECTION, SOLUTION INTRAMUSCULAR; INTRAVENOUS ONCE
Status: COMPLETED | OUTPATIENT
Start: 2025-04-14 | End: 2025-04-14

## 2025-04-14 RX ORDER — HEPARIN SODIUM 5000 [USP'U]/ML
5000 INJECTION, SOLUTION INTRAVENOUS; SUBCUTANEOUS EVERY 8 HOURS SCHEDULED
Status: DISCONTINUED | OUTPATIENT
Start: 2025-04-14 | End: 2025-04-15 | Stop reason: HOSPADM

## 2025-04-14 RX ORDER — CLONIDINE HYDROCHLORIDE 0.1 MG/1
0.1 TABLET ORAL 2 TIMES DAILY
COMMUNITY
Start: 2025-04-02

## 2025-04-14 RX ORDER — AMLODIPINE BESYLATE 10 MG/1
10 TABLET ORAL DAILY
Status: DISCONTINUED | OUTPATIENT
Start: 2025-04-14 | End: 2025-04-15 | Stop reason: HOSPADM

## 2025-04-14 RX ORDER — SODIUM CHLORIDE 0.9 % (FLUSH) 0.9 %
5-40 SYRINGE (ML) INJECTION PRN
Status: DISCONTINUED | OUTPATIENT
Start: 2025-04-14 | End: 2025-04-15 | Stop reason: HOSPADM

## 2025-04-14 RX ORDER — ACETAMINOPHEN 325 MG/1
650 TABLET ORAL EVERY 6 HOURS PRN
Status: DISCONTINUED | OUTPATIENT
Start: 2025-04-14 | End: 2025-04-15 | Stop reason: HOSPADM

## 2025-04-14 RX ORDER — ONDANSETRON 2 MG/ML
4 INJECTION INTRAMUSCULAR; INTRAVENOUS EVERY 6 HOURS PRN
Status: DISCONTINUED | OUTPATIENT
Start: 2025-04-14 | End: 2025-04-15 | Stop reason: HOSPADM

## 2025-04-14 RX ORDER — HYDRALAZINE HYDROCHLORIDE 50 MG/1
100 TABLET, FILM COATED ORAL 3 TIMES DAILY
Status: DISCONTINUED | OUTPATIENT
Start: 2025-04-14 | End: 2025-04-15 | Stop reason: HOSPADM

## 2025-04-14 RX ADMIN — HYDROCODONE BITARTRATE AND ACETAMINOPHEN 1 TABLET: 5; 325 TABLET ORAL at 21:50

## 2025-04-14 RX ADMIN — MORPHINE SULFATE 4 MG: 4 INJECTION, SOLUTION INTRAMUSCULAR; INTRAVENOUS at 10:30

## 2025-04-14 RX ADMIN — CLONIDINE HYDROCHLORIDE 0.1 MG: 0.1 TABLET ORAL at 14:52

## 2025-04-14 RX ADMIN — HYDRALAZINE HYDROCHLORIDE 100 MG: 50 TABLET ORAL at 21:36

## 2025-04-14 RX ADMIN — SODIUM CHLORIDE, PRESERVATIVE FREE 10 ML: 5 INJECTION INTRAVENOUS at 21:35

## 2025-04-14 RX ADMIN — CARVEDILOL 12.5 MG: 6.25 TABLET, FILM COATED ORAL at 14:53

## 2025-04-14 RX ADMIN — HYDROCODONE BITARTRATE AND ACETAMINOPHEN 1 TABLET: 5; 325 TABLET ORAL at 14:43

## 2025-04-14 RX ADMIN — HYDRALAZINE HYDROCHLORIDE 100 MG: 50 TABLET ORAL at 14:52

## 2025-04-14 RX ADMIN — HEPARIN SODIUM 5000 UNITS: 5000 INJECTION INTRAVENOUS; SUBCUTANEOUS at 14:56

## 2025-04-14 RX ADMIN — HEPARIN SODIUM 5000 UNITS: 5000 INJECTION INTRAVENOUS; SUBCUTANEOUS at 21:35

## 2025-04-14 ASSESSMENT — PAIN DESCRIPTION - ONSET
ONSET: GRADUAL
ONSET: GRADUAL

## 2025-04-14 ASSESSMENT — ENCOUNTER SYMPTOMS
RHINORRHEA: 0
BACK PAIN: 1
SORE THROAT: 0
COLOR CHANGE: 0
SINUS PRESSURE: 1
COUGH: 0
CONSTIPATION: 0
DIARRHEA: 0
SINUS PAIN: 0
ABDOMINAL PAIN: 0
NAUSEA: 1
SHORTNESS OF BREATH: 0
VOMITING: 1
WHEEZING: 0

## 2025-04-14 ASSESSMENT — PAIN DESCRIPTION - FREQUENCY
FREQUENCY: CONTINUOUS
FREQUENCY: CONTINUOUS

## 2025-04-14 ASSESSMENT — PAIN DESCRIPTION - LOCATION
LOCATION: ARM

## 2025-04-14 ASSESSMENT — PAIN SCALES - GENERAL
PAINLEVEL_OUTOF10: 7
PAINLEVEL_OUTOF10: 7
PAINLEVEL_OUTOF10: 6
PAINLEVEL_OUTOF10: 4
PAINLEVEL_OUTOF10: 2

## 2025-04-14 ASSESSMENT — PAIN DESCRIPTION - DESCRIPTORS
DESCRIPTORS: ACHING;SORE;HEAVINESS
DESCRIPTORS: ACHING
DESCRIPTORS: ACHING;SORE;HEAVINESS

## 2025-04-14 ASSESSMENT — PAIN DESCRIPTION - PAIN TYPE
TYPE: ACUTE PAIN

## 2025-04-14 ASSESSMENT — PAIN DESCRIPTION - ORIENTATION
ORIENTATION: LEFT

## 2025-04-14 ASSESSMENT — PAIN - FUNCTIONAL ASSESSMENT
PAIN_FUNCTIONAL_ASSESSMENT: PREVENTS OR INTERFERES SOME ACTIVE ACTIVITIES AND ADLS
PAIN_FUNCTIONAL_ASSESSMENT: PREVENTS OR INTERFERES SOME ACTIVE ACTIVITIES AND ADLS
PAIN_FUNCTIONAL_ASSESSMENT: 0-10

## 2025-04-14 NOTE — ED PROVIDER NOTES
[facility] EMERGENCY DEPARTMENT - VISIT NOTE    Patient Name: Lucero Brown  MRN: [id number]  YOB: 1970  Date of Evaluation: 2025  Treating Resident Physician: Lobo Pulido MD  Supervising Physician: Gael Goncalves DO    CHIEF COMPLAINT       Chief Complaint   Patient presents with    EXTREMITY SWELLING     Left arm; Fistula       HISTORY OF PRESENT ILLNESS    HPI    History obtained from chart review and the patient.    Lucero MAXWELL is a 55 y.o. old female who presents to the emergency department by Walk In for evaluation of left upper extremity swelling.  On 3/8, patient underwent basilic vein transposition for AV fistula creation left upper extremity.  Since that time, has been developing slowly progressive worsening extremity swelling.  On 3/17, patient with bedside ultrasound showing a surrounding seroma.  Patient follow-up in office 3/20 with cardiothoracic surgery and  with Dr. Ruiz.  Per Dr. Ruiz's note on , he is recommending fistulogram but would like to delay moderate until  tomorrow for maturation of the fistula; interesting patient has a central vein stenosis activity relieved with angioplasty.  Patient describes event in the past few days, worsening still left upper extremity swelling, no she believes affecting the left side of her neck.    Chart reviewed, relevant history summarized in HPI above.      REVIEW OF SYSTEMS   Review of Systems  Negative unless documented in HPI    PAST MEDICAL AND SURGICAL HISTORY   Lucero MAXWELL  has a past medical history of Aneurysm (2017), Anxiety, Chronic kidney disease, Dental abscess (2021), Eczema, GERD (gastroesophageal reflux disease), Hypertension, Mastoiditis, and Thyroid disease.    Lucero MAXWELL  has a past surgical history that includes  section (86 87 90); salpingectomy; Tubal ligation (); Tunneled venous port placement (Left, 2013); Thyroidectomy, partial (); Heel spur surgery (Right); pr egd  bleeding.    On history and examination, patient with significant left upper extremity swelling and swelling to the left side of her neck.  Obtained CBC, CMP and consulted vascular surgery who evaluated the patient at the bedside.  They reviewed patient's chart, most recent imaging, would like CT noncontrast of left upper extremity.  They are requesting hospitalist admission with likely plan for surgical intervention at some point in time during this patient's visit.  CT Noncon left upper extremity currently pending.    ED COURSE   ED Medications administered this visit (None if left blank):   Medications   morphine injection 4 mg (4 mg IntraVENous Given 4/14/25 1030)            Procedures: (None if left blank)  Procedures:     Consultants:  IP CONSULT TO VASCULAR SURGERY    Documentation:  N/A    MEDICATION CHANGES     New Prescriptions    No medications on file       FINAL IMPRESSION     Final diagnoses:   Complication of arteriovenous dialysis fistula, initial encounter       DISPOSITION   DISPOSITION Decision To Admit 04/14/2025 10:37:51 AM   DISPOSITION CONDITION Stable           Results and plan discussed with patient at bedside. Patient is agreeable to plan.         This transcription was electronically signed. Parts of this transcriptions may have been dictated by use of voice recognition software and electronically transcribed. The transcription may contain errors not detected in proofreading. Please refer to my supervising physician's documentation if my documentation differs.    Electronically Signed: Lobo Pulido MD, 04/14/25, 10:54 AM

## 2025-04-14 NOTE — ED NOTES
Vascular at bedside to assess. Significant swelling noted to L arm and neck area. JVD noted to to Left side.

## 2025-04-14 NOTE — PROGRESS NOTES
I phoned and talked the patient with afternoon.  I reviewed the ultrasound and the CT scan.  At this point my suspicion is that most of her pain is from these hematomas.  On ultrasound the fistula looks like it has good flow and there is no evidence of stenosis in the fistula.  It does look like she must of had some bleeding into the arm because there is too collections that are consistent with hematoma on the CT scan and the ultrasound.  There is no evidence of pseudoaneurysm or active bleeding.  I do not think any this needs surgical drainage but I think at this point it is going to be watchful waiting to allow these to resorb.  I do think she may need some pain control.  I discussed this with her today but from the vascular standpoint I do not think there is much more we would recommend at this point

## 2025-04-14 NOTE — ED NOTES
ED to inpatient nurses report      Chief Complaint:  Chief Complaint   Patient presents with    EXTREMITY SWELLING     Left arm; Fistula     Present to ED from: Home    MOA:     LOC: alert and orientated to name, place, date  Mobility: Independent  Oxygen Baseline: RA    Current needs required: RA     Code Status:   Prior    What abnormal results were found and what did you give/do to treat them? Swelling to L arm  Any procedures or intervention occur? Vascular    Mental Status:  Level of Consciousness: Alert (0)    Psych Assessment:   Psychosocial  Psychosocial (WDL): Within Defined Limits    Vitals:  Patient Vitals for the past 24 hrs:   BP Temp Temp src Pulse Resp SpO2 Height Weight   04/14/25 1017 -- -- -- -- -- 97 % -- --   04/14/25 0916 (!) 131/95 98.3 °F (36.8 °C) Oral 85 19 98 % 1.72 m (5' 7.72\") 74.4 kg (164 lb)        LDAs:   Peripheral IV 04/14/25 Right Antecubital (Active)       Ambulatory Status:  Presents to emergency department  because of falls (Syncope, seizure, or loss of consciousness): No, Age > 70: No, Altered Mental Status, Intoxication with alcohol or substance confusion (Disorientation, impaired judgment, poor safety awaremess, or inability to follow instructions): No, Impaired Mobility: Ambulates or transfers with assistive devices or assistance; Unable to ambulate or transer.: No, Nursing Judgement: No    Diagnosis:  DISPOSITION Admitted 04/14/2025 11:28:19 AM   Final diagnoses:   Complication of arteriovenous dialysis fistula, initial encounter        Consults:  IP CONSULT TO VASCULAR SURGERY     Pain Score:  Pain Assessment  Pain Assessment: 0-10  Pain Level: 7  Pain Location: Arm  Pain Orientation: Left  Pain Descriptors: Aching  Pain Type: Acute pain    C-SSRS:   Risk of Suicide: No Risk    Sepsis Screening:       [place] Fall Risk:  [place] 1 Fall Risk  Presents to emergency department  because of falls (Syncope, seizure, or loss of consciousness): No  Age > 70: No  Altered Mental  Status, Intoxication with alcohol or substance confusion (Disorientation, impaired judgment, poor safety awaremess, or inability to follow instructions): No  Impaired Mobility: Ambulates or transfers with assistive devices or assistance; Unable to ambulate or transer.: No  Nursing Judgement: No    Swallow Screening        Preferred Language:   English      ALLERGIES     Fioricet [butalbital-apap-caffeine], Fish-derived products, and Losartan    SURGICAL HISTORY       Past Surgical History:   Procedure Laterality Date    CARDIAC SURGERY       SECTION  86 87 90    DIALYSIS FISTULA CREATION Left 3/5/2025    LEFT AV FISTULA CREATION performed by Ajith Ruiz MD at [facility] OR    ENDOSCOPY, COLON, DIAGNOSTIC      HEEL SPUR SURGERY Right     [place] EGD TRANSORAL BIOPSY SINGLE/MULTIPLE Left 2018    EGD BIOPSY performed by Meena Zendejas MD at [facility] Endoscopy    SALPINGECTOMY      tubal pregnancy    THYROIDECTOMY, PARTIAL  2011    TUBAL LIGATION  2010    TUNNELED VENOUS PORT PLACEMENT Left     mediport in chest       PAST MEDICAL HISTORY       Past Medical History:   Diagnosis Date    Acute sinusitis 2020    Akathisia 2021    Anaphylactic shock, unspecified, initial encounter 2024    Aneurysm 2017    Anxiety     Arthropathy of right shoulder 10/19/2023    Cardiac tamponade 2025    Chronic kidney disease     Dental abscess 2021    right lower jaw/tooth    Diastolic congestive heart failure (HCC) 2025    Diverticulitis 2025    Dysphagia 2025    Eczema     all her life    GERD (gastroesophageal reflux disease)     History of pericardiectomy 2025    Hypertension     Hypertensive emergency 2021    Iron deficiency anemia, unspecified 2024    Mastoiditis     Mild protein-calorie malnutrition 2024    Myocardial infarction (HCC) 2025    Pericardial effusion 2025    Pneumonia 2025    Thyroid disease     having thyroid removed

## 2025-04-14 NOTE — CONSULTS
CT/CV Surgery Consult Note    2025 10:35 AM  Surgeon: Dr. Fontaine    Reason for Consult: Left arm swelling and pain    HPI:    Ms. Brown  is a 55 year old AA female with hx of CKD s/p left basilic vein transposition on 3/5/25 by Dr. Ruiz. She presents to the ED today with increasing left arm pain and swelling that is not manageable at home with Tylenol. She was seen in the office last week on . At that time Dr. Ruiz felt her pain and swelling could represent a central vein stenosis. She is scheduled for a fistulogram on  with Dr. Lynn.    She receive dialysis Tues, Thurs, Sat. Last dialysis was two days ago through right IJ tunneled catheter.    Vital Signs: BP (!) 131/95   Pulse 85   Temp 98.3 °F (36.8 °C) (Oral)   Resp 19   Ht 1.72 m (5' 7.72\")   Wt 74.4 kg (164 lb)   LMP 2021 (Approximate)   SpO2 98%   BMI 25.14 kg/m²    Temp (24hrs), Av.3 °F (36.8 °C), Min:98.3 °F (36.8 °C), Max:98.3 °F (36.8 °C)      Labs:   CBC:  Recent Labs     25  0959   WBC 4.5*   HGB 10.0*   HCT 32.5*   MCV 92.9   *       Imaging:  LUE CT w/o contrast: PENDING    PastMedical History:  Lucero MAXWELL  has a past medical history of Aneurysm, Anxiety, Chronic kidney disease, Dental abscess, Eczema, GERD (gastroesophageal reflux disease), Hypertension, Mastoiditis, and Thyroid disease.    Past Surgical History:  The patient  has a past surgical history that includes  section (86 87 90); salpingectomy; Tubal ligation (); Tunneled venous port placement (Left, ); Thyroidectomy, partial (); Heel spur surgery (Right); pr egd transoral biopsy single/multiple (Left, 2018); Endoscopy, colon, diagnostic; Cardiac surgery; and Dialysis fistula creation (Left, 3/5/2025).    Allergies:  The patient is allergic to fioricet [butalbital-apap-caffeine], fish-derived products, and losartan.    Family History:  This patient's family history includes Cancer in her maternal grandfather and

## 2025-04-14 NOTE — ED NOTES
Call placed to [facility] requesting recent 2-3 weeks of blood sugars be faxed to writer.    Pt off unit to vascular.

## 2025-04-14 NOTE — H&P
Hospitalist History & Physical     Patient: Lucero Brown 55 y.o. female : 1970  Date of Admission: 2025  CODE STATUS: Full Code    Date of Service: Pt seen/examined on 25 and Admitted to Observation with expected LOS less than two midnights due to medical therapy.     ASSESSMENT AND PLAN    Active Problems:    Left upper extremity swelling  Pt had fistula creation in left upper extremity on 2025. Seen in office on  for LUE swelling. Dr. Ruiz opted to wait for fistulagram until .   Ddx include enlarging seroma, cellulitis, lymphedema, compartment syndrome, and failure of AV fistula.  BMP elevated creatinine (at baseline) and hypocalcemia. CBC anemia (at baseline) and leukopenia (at baseline)  CT imaging pending. Vascular US ordered  Neurovascularly intact, however, reporting some numbness in distal LUE. No palpable thrill.  Vascular surgery consulted out of ED, recommending further imaging with possible intervention during admission.  Neurovascular checks q 4 hours.     End stage renal disease  Dialysis T, H, Sat. Last on   Creatinine on admission 7.7. Baseline appears to be 6.3-6.8. No TERESA indication.  Pt euvolemic, with exception to swelling in LUE. Lungs CTA bilaterally. No BLE edema.  Electrolytes within normal range.   Avoid nephrotoxic agents if possible.   Prehydrate if Pt to undergo fistulagram    Chronic Problems (Reviewed and stable unless noted. Increase case complexity)    Anemia in chronic kidney disease: Appears to be at baseline. Monitor with daily CBC.    Hypertension: Managed with clonidine, hydralazine, carvedilol, and amlodipine.     Hyperlipidemia: Managed with atorvastatin    Generalized anxiety disorder: Pt reports that she was trialed on Zoloft and she \"felt weird\" on it. She self d/c after 1 week. She reports she smokes marijuana for relief of anxiety. States she does this very seldomly.     Postprocedural hypothyroidism: Not on medication. TSH

## 2025-04-14 NOTE — ED TRIAGE NOTES
Pt to ED via private vehicle w/rprts of L arm pain for the past two weeks. Rprts symptoms have been worsening in the last few days. Rprts on dialysis; tues, thurs and Saturday; last dialysis two days ago. Currently using port and not fistula. Pt rprts scheduled for US of L arm, but the swelling has increased and now is affecting L side of neck and chest area. Rprts 7/10 radiating pain. A&O x4. Breathing easy and unlabored on RA. Vitals updated. Call light within reach. Protocol orders initiated.

## 2025-04-14 NOTE — ED NOTES
Pt to and from vascular study. TAMARA Mullins at bedside to assess and update on POC and admission process.

## 2025-04-15 VITALS
RESPIRATION RATE: 19 BRPM | BODY MASS INDEX: 26.27 KG/M2 | HEART RATE: 78 BPM | SYSTOLIC BLOOD PRESSURE: 136 MMHG | HEIGHT: 67 IN | TEMPERATURE: 98.4 F | OXYGEN SATURATION: 100 % | WEIGHT: 167.4 LBS | DIASTOLIC BLOOD PRESSURE: 89 MMHG

## 2025-04-15 LAB
ANION GAP SERPL CALC-SCNC: 13 MEQ/L (ref 8–16)
BASOPHILS ABSOLUTE: 0 THOU/MM3 (ref 0–0.1)
BASOPHILS NFR BLD AUTO: 0.5 %
BUN SERPL-MCNC: 62 MG/DL (ref 8–23)
CALCIUM SERPL-MCNC: 8.6 MG/DL (ref 8.6–10)
CHLORIDE SERPL-SCNC: 104 MEQ/L (ref 98–111)
CO2 SERPL-SCNC: 19 MEQ/L (ref 22–29)
CREAT SERPL-MCNC: 8.4 MG/DL (ref 0.5–0.9)
DEPRECATED RDW RBC AUTO: 54.2 FL (ref 35–45)
EOSINOPHIL NFR BLD AUTO: 2 %
EOSINOPHILS ABSOLUTE: 0.1 THOU/MM3 (ref 0–0.4)
ERYTHROCYTE [DISTWIDTH] IN BLOOD BY AUTOMATED COUNT: 15.9 % (ref 11.5–14.5)
GFR SERPL CREATININE-BSD FRML MDRD: 5 ML/MIN/1.73M2
GLUCOSE SERPL-MCNC: 89 MG/DL (ref 74–109)
HCT VFR BLD AUTO: 31.7 % (ref 37–47)
HGB BLD-MCNC: 9.7 GM/DL (ref 12–16)
IMM GRANULOCYTES # BLD AUTO: 0.01 THOU/MM3 (ref 0–0.07)
IMM GRANULOCYTES NFR BLD AUTO: 0.2 %
LYMPHOCYTES ABSOLUTE: 0.8 THOU/MM3 (ref 1–4.8)
LYMPHOCYTES NFR BLD AUTO: 19.4 %
MCH RBC QN AUTO: 28.4 PG (ref 26–33)
MCHC RBC AUTO-ENTMCNC: 30.6 GM/DL (ref 32.2–35.5)
MCV RBC AUTO: 92.7 FL (ref 81–99)
MONOCYTES ABSOLUTE: 0.5 THOU/MM3 (ref 0.4–1.3)
MONOCYTES NFR BLD AUTO: 13 %
NEUTROPHILS ABSOLUTE: 2.7 THOU/MM3 (ref 1.8–7.7)
NEUTROPHILS NFR BLD AUTO: 64.9 %
NRBC BLD AUTO-RTO: 0 /100 WBC
PLATELET # BLD AUTO: 119 THOU/MM3 (ref 130–400)
PMV BLD AUTO: 11.9 FL (ref 9.4–12.4)
POTASSIUM SERPL-SCNC: 5.8 MEQ/L (ref 3.5–5.2)
RBC # BLD AUTO: 3.42 MILL/MM3 (ref 4.2–5.4)
SODIUM SERPL-SCNC: 136 MEQ/L (ref 135–145)
WBC # BLD AUTO: 4.1 THOU/MM3 (ref 4.8–10.8)

## 2025-04-15 PROCEDURE — 36415 COLL VENOUS BLD VENIPUNCTURE: CPT

## 2025-04-15 PROCEDURE — 80048 BASIC METABOLIC PNL TOTAL CA: CPT

## 2025-04-15 PROCEDURE — 6370000000 HC RX 637 (ALT 250 FOR IP): Performed by: PHYSICIAN ASSISTANT

## 2025-04-15 PROCEDURE — G0378 HOSPITAL OBSERVATION PER HR: HCPCS

## 2025-04-15 PROCEDURE — 85025 COMPLETE CBC W/AUTO DIFF WBC: CPT

## 2025-04-15 PROCEDURE — 6360000002 HC RX W HCPCS

## 2025-04-15 PROCEDURE — 99239 HOSP IP/OBS DSCHRG MGMT >30: CPT | Performed by: PHYSICIAN ASSISTANT

## 2025-04-15 PROCEDURE — 6370000000 HC RX 637 (ALT 250 FOR IP)

## 2025-04-15 PROCEDURE — APPSS30 APP SPLIT SHARED TIME 16-30 MINUTES: Performed by: PHYSICIAN ASSISTANT

## 2025-04-15 PROCEDURE — 96372 THER/PROPH/DIAG INJ SC/IM: CPT

## 2025-04-15 RX ORDER — HYDROCODONE BITARTRATE AND ACETAMINOPHEN 5; 325 MG/1; MG/1
1 TABLET ORAL EVERY 6 HOURS PRN
Qty: 20 TABLET | Refills: 0 | Status: SHIPPED | OUTPATIENT
Start: 2025-04-15 | End: 2025-04-20

## 2025-04-15 RX ADMIN — CLONIDINE HYDROCHLORIDE 0.1 MG: 0.1 TABLET ORAL at 08:28

## 2025-04-15 RX ADMIN — HEPARIN SODIUM 5000 UNITS: 5000 INJECTION INTRAVENOUS; SUBCUTANEOUS at 06:34

## 2025-04-15 RX ADMIN — DOXAZOSIN MESYLATE 2 MG: 2 TABLET ORAL at 08:28

## 2025-04-15 RX ADMIN — ATORVASTATIN CALCIUM 20 MG: 20 TABLET, FILM COATED ORAL at 08:28

## 2025-04-15 RX ADMIN — CARVEDILOL 12.5 MG: 6.25 TABLET, FILM COATED ORAL at 08:28

## 2025-04-15 RX ADMIN — HYDROCODONE BITARTRATE AND ACETAMINOPHEN 1 TABLET: 5; 325 TABLET ORAL at 06:34

## 2025-04-15 RX ADMIN — AMLODIPINE BESYLATE 10 MG: 10 TABLET ORAL at 08:28

## 2025-04-15 RX ADMIN — HYDRALAZINE HYDROCHLORIDE 100 MG: 50 TABLET ORAL at 08:28

## 2025-04-15 ASSESSMENT — PAIN - FUNCTIONAL ASSESSMENT: PAIN_FUNCTIONAL_ASSESSMENT: ACTIVITIES ARE NOT PREVENTED

## 2025-04-15 ASSESSMENT — PAIN SCALES - GENERAL
PAINLEVEL_OUTOF10: 0
PAINLEVEL_OUTOF10: 4

## 2025-04-15 ASSESSMENT — PAIN DESCRIPTION - DESCRIPTORS: DESCRIPTORS: THROBBING;TIGHTNESS

## 2025-04-15 ASSESSMENT — PAIN DESCRIPTION - LOCATION: LOCATION: ARM

## 2025-04-15 ASSESSMENT — PAIN DESCRIPTION - ORIENTATION: ORIENTATION: LEFT

## 2025-04-15 NOTE — DISCHARGE SUMMARY
Hospital Medicine Discharge Summary      Patient Identification:   Lucero Brown   : 1970  MRN: [id number]   Account: [id number]      Patient's PCP: Raiza Castillo APRN - CNP    Admit Date: 2025     Discharge Date: 4/15/2025 10:42 AM    Admitting Physician: Ashita Behl, MD     Discharge Physician: TAMARA Faulkner     Discharge Diagnoses:    Active Hospital Problems    Diagnosis Date Noted    History of acute heart failure [Z86.79] 2025    Left upper extremity swelling [M79.89] 2025    Complication of arteriovenous dialysis fistula [T82.9XXA] 2025    End stage renal disease (HCC) [N18.6] 2025    Secondary hyperparathyroidism of renal origin [N25.81] 2025    Adjustment disorder with anxious mood [F43.22] 2024    Anemia in chronic kidney disease [N18.9, D63.1] 2024    Postprocedural hypothyroidism [E89.0] 2024    Generalized anxiety disorder [F41.1] 2021    Hyperlipidemia [E78.5] 2021    Nicotine dependence [F17.200] 2012    Obesity [E66.9] 2012    Hypertension [I10] 2012       Discharge Assessment & Plan:   Left upper extremity swelling  Vascular consulted - Note for day of DC \"Hematomas at left upper extremity s/p basilic vein transposition on 3/5/25 with Dr. Ruiz. US demonstrates good flow of fistula. No pseudoaneurysm or active bleeding. Surgical drainage not indicated at this time. Recommend Norco for pain control. Follow up with Dr. Lynn on  as scheduled. Ok for discharge from vascular standpoint.\"     End stage renal disease  Dialysis T, H, Sat. Last on   Creatinine on admission 7.7. Baseline appears to be 6.3-6.8. No TERESA indication.  Pt euvolemic, with exception to swelling in LUE. Lungs CTA bilaterally. No BLE edema.    The patient was seen and examined on day of discharge and this discharge summary is in conjunction with any daily progress note from day of discharge.    Hospital Course:   Lucero

## 2025-04-15 NOTE — PROGRESS NOTES
CT/CV Surgery Progress Note    4/15/2025 9:24 AM    Subjective:  Ms. Brown is resting comfortably in bed, alert, and in no acute distress. Pt denies chest pressure, SOB, fever,chills, N/V/D. She states the oral norco is controlling her pain. No problems or change in status overnight.    Intake/Output Summary (Last 24 hours) at 4/15/2025 0924  Last data filed at 2025 2336  Gross per 24 hour   Intake 415 ml   Output --   Net 415 ml     Vital Signs: /89   Pulse 78   Temp 98.4 °F (36.9 °C) (Oral)   Resp 19   Ht 1.702 m (5' 7\")   Wt 75.9 kg (167 lb 6.4 oz)   LMP 2021 (Approximate)   SpO2 100%   BMI 26.22 kg/m²    Temp (24hrs), Av.3 °F (36.8 °C), Min:98.2 °F (36.8 °C), Max:98.5 °F (36.9 °C)    Labs:   CBC:  Recent Labs     25  0959 04/15/25  0605   WBC 4.5* 4.1*   HGB 10.0* 9.7*   HCT 32.5* 31.7*   MCV 92.9 92.7   * 119*     BMP:   Recent Labs     25  0959 04/15/25  0605    136   K 5.1 5.8*    104   CO2 23 19*   BUN 52* 62*   CREATININE 7.7* 8.4*       Imagin25 Left UE Duplex  PROCEDURE: VAS DUP UPPER EXTREMITY ARTERIES LEFT     CLINICAL INFORMATION: would like Left UE duplex.  Please eval for patency and  flow volume of fistula.  if able see if there is elevated velocity distal.   Please also eval for fluid collections around fistula and asses for any pseudo.;  AV Fistula     COMPARISON: Vascular ultrasound 3/17/2025     TECHNIQUE: Multiple grayscale and color flow sonographic images of the major  arteries of the left upper extremity were obtained. Evaluation of the patient's  AV fistula was also performed.. Spectral Doppler waveforms were obtained and  velocity measurements were measured.                FINDINGS:     VELOCITY MEASUREMENTS: The velocity measurements throughout the brachial artery,  ulnar artery and radial arteries appear to be within normal limits.     This evaluation of the fistula is markedly limited.     There is a large  and/or cellulitis.  3. No definite fracture or evidence of osteomyelitis.    Scheduled Meds:    amLODIPine  10 mg Oral Daily    atorvastatin  20 mg Oral Daily    carvedilol  12.5 mg Oral BID WC    cloNIDine  0.1 mg Oral BID    doxazosin  2 mg Oral Daily    hydrALAZINE  100 mg Oral TID    sodium chloride flush  5-40 mL IntraVENous 2 times per day    heparin (porcine)  5,000 Units SubCUTAneous 3 times per day     ROS: All neg unless specifically mentioned in subjective section.     Exam:  General Appearance: alert ,conversing, in no acute distress  Cardiovascular: normal rate, regular rhythm  Pulmonary/Chest: normal air movement, no respiratory distress  Neurological: alert, oriented, normal speech, no focal findings or movement disorder noted  Left UE: +Swelling of left UE, hematoma at incision site at without wound dehiscence or drainage, mild tenderness with palpation    Assessment:   Patient Active Problem List   Diagnosis    Hypertension    Eczema    Obesity    Nicotine dependence    End stage renal disease (HCC)    Secondary hyperparathyroidism of renal origin    Postprocedural hypothyroidism    Hyperlipidemia    Generalized anxiety disorder    Anemia in chronic kidney disease    Adjustment disorder with anxious mood    History of acute heart failure    Left upper extremity swelling    History of cocaine use    Complication of arteriovenous dialysis fistula     Plan:   Hematomas at left upper extremity s/p basilic vein transposition on 3/5/25 with Dr. Ruiz. US demonstrates good flow of fistula. No pseudoaneurysm or active bleeding. Surgical drainage not indicated at this time. Recommend Norco for pain control. Follow up with Dr. Lynn on 4/22 as scheduled. Ok for discharge from vascular standpoint.     The plan of care was discussed in detail with  Dr. Lizette Parham PA-C

## 2025-04-15 NOTE — PROGRESS NOTES
Patient alert and oriented at the time of discharge teaching, AVS reviewed with patient and patient denies any questions or concerns at this time. No new medications at discharge to review. Patient aware of follow up appts to be scheduled following discharge. IV and monitors removed and patient escorted to the main entrance via staff.

## 2025-04-15 NOTE — PLAN OF CARE
Problem: Chronic Conditions and Co-morbidities  Goal: Patient's chronic conditions and co-morbidity symptoms are monitored and maintained or improved  Outcome: Progressing  Flowsheets  Taken 4/14/2025 2130 by Stephen William RN  Care Plan - Patient's Chronic Conditions and Co-Morbidity Symptoms are Monitored and Maintained or Improved: Monitor and assess patient's chronic conditions and comorbid symptoms for stability, deterioration, or improvement  Taken 4/14/2025 1318 by Jaky Bryan RN  Care Plan - Patient's Chronic Conditions and Co-Morbidity Symptoms are Monitored and Maintained or Improved: Monitor and assess patient's chronic conditions and comorbid symptoms for stability, deterioration, or improvement     Problem: Discharge Planning  Goal: Discharge to home or other facility with appropriate resources  Outcome: Progressing  Flowsheets  Taken 4/14/2025 2130 by Stephen William RN  Discharge to home or other facility with appropriate resources: Identify barriers to discharge with patient and caregiver  Taken 4/14/2025 1318 by Jaky Bryan RN  Discharge to home or other facility with appropriate resources: Identify barriers to discharge with patient and caregiver     Problem: Pain  Goal: Verbalizes/displays adequate comfort level or baseline comfort level  Outcome: Progressing  Flowsheets (Taken 4/14/2025 1318 by Jaky Bryan RN)  Verbalizes/displays adequate comfort level or baseline comfort level: Encourage patient to monitor pain and request assistance     Problem: Safety - Adult  Goal: Free from fall injury  Outcome: Progressing  Flowsheets (Taken 4/15/2025 0049)  Free From Fall Injury:   Instruct family/caregiver on patient safety   Based on caregiver fall risk screen, instruct family/caregiver to ask for assistance with transferring infant if caregiver noted to have fall risk factors     Problem: Skin/Tissue Integrity - Adult  Goal: Skin integrity remains intact  Outcome: Progressing  Flowsheets

## 2025-04-22 ENCOUNTER — HOSPITAL ENCOUNTER (OUTPATIENT)
Dept: INTERVENTIONAL RADIOLOGY/VASCULAR | Age: 55
Discharge: HOME | End: 2025-04-22
Attending: SURGERY | Admitting: SURGERY
Payer: COMMERCIAL

## 2025-04-22 ENCOUNTER — PREP FOR PROCEDURE (OUTPATIENT)
Dept: CARDIOTHORACIC SURGERY | Age: 55
End: 2025-04-22

## 2025-04-22 VITALS
OXYGEN SATURATION: 100 % | TEMPERATURE: 98.1 F | DIASTOLIC BLOOD PRESSURE: 92 MMHG | RESPIRATION RATE: 15 BRPM | HEIGHT: 67 IN | WEIGHT: 163.14 LBS | BODY MASS INDEX: 25.61 KG/M2 | SYSTOLIC BLOOD PRESSURE: 142 MMHG | HEART RATE: 85 BPM

## 2025-04-22 DIAGNOSIS — N18.6 END STAGE RENAL DISEASE (HCC): ICD-10-CM

## 2025-04-22 DIAGNOSIS — M79.602 PAIN OF LEFT UPPER EXTREMITY: Primary | ICD-10-CM

## 2025-04-22 DIAGNOSIS — Z98.890 S/P ARTERIOVENOUS (AV) FISTULA CREATION: ICD-10-CM

## 2025-04-22 PROCEDURE — 6360000004 HC RX CONTRAST MEDICATION: Performed by: SURGERY

## 2025-04-22 PROCEDURE — 76937 US GUIDE VASCULAR ACCESS: CPT | Performed by: SURGERY

## 2025-04-22 PROCEDURE — 2709999900 IR FISTULAGRAM

## 2025-04-22 PROCEDURE — 6360000002 HC RX W HCPCS: Performed by: SURGERY

## 2025-04-22 PROCEDURE — 36902 INTRO CATH DIALYSIS CIRCUIT: CPT | Performed by: SURGERY

## 2025-04-22 PROCEDURE — 36902 INTRO CATH DIALYSIS CIRCUIT: CPT

## 2025-04-22 PROCEDURE — 7100000010 HC PHASE II RECOVERY - FIRST 15 MIN: Performed by: SURGERY

## 2025-04-22 RX ORDER — OXYCODONE AND ACETAMINOPHEN 5; 325 MG/1; MG/1
1 TABLET ORAL EVERY 6 HOURS PRN
Qty: 12 TABLET | Refills: 0 | Status: SHIPPED | OUTPATIENT
Start: 2025-04-22 | End: 2025-04-25

## 2025-04-22 RX ORDER — LIDOCAINE HYDROCHLORIDE 20 MG/ML
INJECTION, SOLUTION EPIDURAL; INFILTRATION; INTRACAUDAL; PERINEURAL PRN
Status: COMPLETED | OUTPATIENT
Start: 2025-04-22 | End: 2025-04-22

## 2025-04-22 RX ORDER — IOPAMIDOL 612 MG/ML
INJECTION, SOLUTION INTRAVASCULAR PRN
Status: COMPLETED | OUTPATIENT
Start: 2025-04-22 | End: 2025-04-22

## 2025-04-22 RX ADMIN — LIDOCAINE HYDROCHLORIDE 4 ML: 20 INJECTION, SOLUTION EPIDURAL; INFILTRATION; INTRACAUDAL; PERINEURAL at 14:10

## 2025-04-22 RX ADMIN — IOPAMIDOL 15 ML: 612 INJECTION, SOLUTION INTRAVENOUS at 14:10

## 2025-04-22 NOTE — OP NOTE
Operative Note      Patient: Lucero Brown  YOB: 1970  MRN: [id number]    Date of Procedure: 4/22/2025    Preoperative Diagnosis:  Malfunctioning left upper extremity AV fistula, left arm swelling    Post-Op Diagnosis: Same       Procedure:  1) US guided vascular access of left brachial artery to basilic vein fistula  2) Fistulagram with angioplasty of outflow track, basilic vein with 6 x 40 mm followed by 8 x 80 mm Balloon    Surgeon(s):  Loni Lynn MD    Anesthesia: local    Estimated Blood Loss (mL): 17 ml    Contrast:  15 ml    Complications: None    Specimens: none    Implants: none      Drains: none    Findings:  Infection Present At Time Of Surgery (PATOS) (choose all levels that have infection present): No infection present  Radiographic Findings: This is a left brachial artery to basilic vein fistula.  There is no inflow or central venous stenosis.  There is severe stenosis or kink >80% in the distal basilic vein as it joins the axillary vein.  This was treated initially with a 6 mm balloon and then post dilated with a 8 mm balloon.  There was minimal stenosis now and excellent thrill at completion.    Plan:  Follow up with Dr. Ruiz in 2 weeks for re-evaluation.  Ace wrap arm to help with swelling.    Detailed Description of Procedure:     Lucero Brown was brought to the Interventional radiology suite for evaluation of left upper extremity AV fistula.  After appropriate timeout performed the left upper extremity was prepped and draped in standard sterile fashion.  I began by evaluating the AV fistula with ultrasound, it was patent and compressible.  Local anesthesia delivered, under ultrasound guidance using a micropuncture needle the AV fistula was accessed, permanent ultrasound images saved.  A 4-Guyanese micropuncture sheath inserted and flushed.  Fistulagram performed with above findings.  I up sized to a short 6-Guyanese sheath and performed angioplasty of the basilic vein

## 2025-04-22 NOTE — PROGRESS NOTES
TRANSFER - OUT REPORT:    Verbal report given to Joshua RN(name) on Lucero Brown being transferred to (unit) for routine progression of patient care       Report consisted of patient's Situation, Background, Assessment and   Recommendations(SBAR).     Information from the following report(s) Nurse Handoff Report, Surgery Report, and MAR was reviewed with the receiving nurse.    Opportunity for questions and clarification was provided.      Patient transported with:   Monitor

## 2025-04-22 NOTE — H&P
Division of Vascular Surgery        H&P Update    Patient's Office Note from 25 was reviewed. There are no changes today.    Plan to proceed with fistulagram.      Electronically signed by Loni Lynn MD on 25 at 1:18 PM EDT      [facility] [place]  O: [phone]  C: [phone]  Email: [email or web address]     Patient: Lucero Brown  : 1970  MRN: [id number]  DOS: 2025     Referring provider:  No ref. provider found            HPI:  Lucero Brown is a 55 y.o. female who returns to the office for follow-up on her basilic vein transposition.  She is complaining of left upper extremity swelling which is the ipsilateral extremity.  It is not improved and may have become worse.  The construction of this transposition was done on .  Past Medical History        Past Medical History:   Diagnosis Date    Aneurysm 2017    Anxiety      Chronic kidney disease      Dental abscess 2021     right lower jaw/tooth    Eczema       all her life    GERD (gastroesophageal reflux disease)      Hypertension      Mastoiditis      Thyroid disease       having thyroid removed 13         Family History         Family History   Problem Relation Age of Onset    Cancer Mother      Cancer Maternal Grandfather           N/a    Diabetes Paternal Grandfather      Heart Disease Paternal Grandfather           Social History               Socioeconomic History    Marital status: Single       Spouse name: Not on file    Number of children: 3    Years of education: 12    Highest education level: Not on file   Occupational History    Occupation: unemployed   Tobacco Use    Smoking status: Some Days       Current packs/day: 0.25       Average packs/day: 0.3 packs/day for 33.0 years (8.3 ttl pk-yrs)       Types: Cigarettes    Smokeless tobacco: Never   Vaping Use    Vaping status: Never Used   Substance and Sexual Activity    Alcohol use: Not Currently    Drug use: Not

## 2025-04-22 NOTE — PROGRESS NOTES
1332 Patient received in IR for fistulogram.   1333 This procedure has been fully reviewed with the patient and written informed consent has been obtained.  1343 Procedure started with Dr. Lynn.  1345 Access to the left run off vein via sonosite.   1358 Angioplasty of the left basilic axillary vein with Erickson 6 x 40 balloon.  1406 Angioplasty of the left basilic axillary vein with Dos Rios 35 8 x 80 balloon.  1410 Procedure completed; patient tolerated well. Sheath removed, manual pressure held.  1412 Left arm wrapped with ace bandage per Dr. Lynn. Dressing to left arm fistula, gauze and ace wrap; no bleeding noted.   1420 Patient on bed; comfort ensured.  1424 Patient taken to 2E via bed/transport/nurse. Pt alert and oriented x3; follows commands. Skin pink, warm, and dry. Respirations easy, regular, and nonlabored.

## 2025-04-22 NOTE — FLOWSHEET NOTE
Patient admitted to 2E08  Ambulatory for fisulagram .  Patient NPO.   Vital signs obtained.   Assessment and data collection intiated.   Oriented to room.  Policies and procedures for 2E explained.   All questions answered with no further questions at this time.   Fall prevention and safety precautions discussed with patient.     Explained patients right to have family, representative or physician notified of their admission.  Patient has Declined for physician to be notified.  Patient has Declined for family/representative to be notified.

## 2025-04-22 NOTE — PLAN OF CARE
Problem: Discharge Planning  Goal: Discharge to home or other facility with appropriate resources  4/22/2025 1514 by Joshua Cummings, RN  Outcome: Adequate for Discharge  4/22/2025 1140 by Joshua Cummings, RN  Outcome: Progressing

## 2025-04-22 NOTE — FLOWSHEET NOTE
Telemetry off. Left arm unchanged. Acewrap intact. Pt dressing for discharge to home. Friend present.

## 2025-04-22 NOTE — PLAN OF CARE
Problem: Discharge Planning  Goal: Discharge to home or other facility with appropriate resources  Outcome: Progressing   Pt to have fistulagram today.

## 2025-04-22 NOTE — FLOWSHEET NOTE
04/22/25 1455   AVS Reviewed   AVS & discharge instructions reviewed with patient and/or representative? Yes   Reviewed instructions with Patient   Level of Understanding Questions answered;Verbalized understanding

## 2025-04-22 NOTE — DISCHARGE INSTRUCTIONS
Ace wrap on during the day, off in the evenings    Elevate arm when you can    Ok to use arm for day to day activities    Follow up with Dr. Ruiz in 2 weeks, office will call to schedule appointment         Peripheral Artery or Venous Angioplasty: What to Expect at Home  Your Recovery  Peripheral artery angioplasty (say \"dnc-WYUG-br-jethro SÁNCHEZ-ter-anamaria TRIMBLEIOB-zfn-nn-plass-coleman\") and venous angioplasty are procedures that widen narrowed arteries or veins in the pelvis, legs, or arms. Your doctor used a tube called a catheter to find narrowed arteries or veins and then widened them.  You may have a bruise or a small lump where the catheter was put in a blood vessel. The area may feel sore for a few days after the procedure. You can do light activities at home. But don't do anything strenuous until your doctor says it is okay. This may be for several days.  After surgery, blood may flow better throughout your leg or arm. This can decrease pain, numbness, and cramping.  You will likely have regular checkups with your doctor to check your arteries or veins.  This care sheet gives you a general idea about how long it will take for you to recover. But each person recovers at a different pace. Follow the steps below to get better as quickly as possible.  How can you care for yourself at home?  Activity  Do not do strenuous exercise and do not lift anything heavy until your doctor says it is okay. This may be for several days. You can walk around your home and do light activity, such as cooking.  Go back to regular exercise when your doctor says it is okay.  If you work, you may need to take 1 or 2 days off. It depends on the type of work you do and how you feel.  If the catheter was placed in your groin, try not to walk up stairs for the first couple of days.  If the catheter was placed in your arm near your wrist, do not bend your wrist deeply for the first couple of days. Be careful using your hand to get into and out of a

## 2025-04-22 NOTE — FLOWSHEET NOTE
Received from specials radiology. Left arm ace wrap intact. No drainage noted. Pt alert and cooperative. Resting with easy resp

## 2025-05-06 ENCOUNTER — OFFICE VISIT (OUTPATIENT)
Age: 55
End: 2025-05-06

## 2025-05-06 VITALS
WEIGHT: 166.2 LBS | BODY MASS INDEX: 26.09 KG/M2 | HEIGHT: 67 IN | HEART RATE: 87 BPM | SYSTOLIC BLOOD PRESSURE: 119 MMHG | DIASTOLIC BLOOD PRESSURE: 80 MMHG

## 2025-05-06 DIAGNOSIS — T82.590D MECHANICAL COMPLICATION OF ARTERIOVENOUS FISTULA SURGICALLY CREATED, SUBSEQUENT ENCOUNTER: ICD-10-CM

## 2025-05-06 DIAGNOSIS — N18.6 END STAGE RENAL DISEASE (HCC): Primary | ICD-10-CM

## 2025-05-06 PROCEDURE — 99024 POSTOP FOLLOW-UP VISIT: CPT | Performed by: SURGERY

## 2025-05-06 NOTE — PROGRESS NOTES
[facility] [place] [facility]  [facility] VASCULAR SURGERY  [address], [address]  [facility] [address]  Dept: [phone]  Loc: [phone]     Patient: Lucero Brown  : 1970  MRN: [id number]  DOS: 2025            HPI:  Lucero Brown is a 55 y.o. female who returns to the office regarding her left upper extremity fistula.  Recall that she had significant left upper extremity edema after a basilic vein transposition.  The transposition is working well in terms of the perceived radial flow on examination and during fistulography.  Fistulography done by my partner revealed a stenosis in the transposition where it met the axillary vein.  This was treated well with angioplasty.  She still has edema but says that it is much improved.    Review of Systems    Vitals:    25 1022   BP: 119/80   BP Site: Right Upper Arm   Patient Position: Sitting   BP Cuff Size: Medium Adult   Pulse: 87   Weight: 75.4 kg (166 lb 3.2 oz)   Height: 1.702 m (5' 7\")          Physical Exam  On examination she has less edema but the arm is still at least twice the size of the right.  There is a palpable thrill.  The hand is warm and well-perfused.  She is not tense like she was previously.  She has less pain.    Assessment:  1. End stage renal disease (HCC)    2. Mechanical complication of arteriovenous fistula surgically created, subsequent encounter          Plan:  At this point I think the dialysis access center should wait to use the access until .  It is only a month old at this point.  We will see her back in a month to authorize its use    Electronically signed by:  Ajith Ruiz MD

## 2025-06-03 ENCOUNTER — OFFICE VISIT (OUTPATIENT)
Age: 55
End: 2025-06-03

## 2025-06-03 VITALS
HEART RATE: 82 BPM | BODY MASS INDEX: 26.71 KG/M2 | DIASTOLIC BLOOD PRESSURE: 93 MMHG | WEIGHT: 170.2 LBS | SYSTOLIC BLOOD PRESSURE: 170 MMHG | HEIGHT: 67 IN

## 2025-06-03 DIAGNOSIS — N18.6 END STAGE RENAL DISEASE (HCC): Primary | ICD-10-CM

## 2025-06-03 DIAGNOSIS — T82.590D MECHANICAL COMPLICATION OF ARTERIOVENOUS FISTULA SURGICALLY CREATED, SUBSEQUENT ENCOUNTER: ICD-10-CM

## 2025-06-03 PROCEDURE — 99024 POSTOP FOLLOW-UP VISIT: CPT | Performed by: SURGERY

## 2025-06-03 RX ORDER — CINACALCET 30 MG/1
30 TABLET, FILM COATED ORAL DAILY
COMMUNITY
Start: 2025-05-13

## 2025-06-03 NOTE — PROGRESS NOTES
[facility] [place] [facility]  [facility] VASCULAR SURGERY  [address], [address]  [place] [address]  Dept: [phone]  Loc: [phone]     Patient: Lucero Brwon  : 1970  MRN: [id number]  DOS: 6/3/2025            HPI:  Lucero Brown is a 55 y.o. female who returns to the office regarding her left upper extremity fistula.  Recall that she has a basilic vein transposition.  He continues to have a palpable thrill and does not have significant pulsatility.  Her left upper extremity edema has improved.  Interestingly she is complaining now of face and shoulder edema.  I think she has a significant innominate stenosis however her upper extremity edema has improved.  The fistulography was done by my partner and we did not note any central vein stenosis.  Her face and shoulder swelling is subtle.  She does have chest wall collaterals.  Her fistula was constructed on 2025.    Review of Systems    Vitals:    25 1001   BP: (!) 170/93   BP Site: Right Upper Arm   Patient Position: Sitting   BP Cuff Size: Medium Adult   Pulse: 82   Weight: 77.2 kg (170 lb 3.2 oz)   Height: 1.702 m (5' 7\")          Physical Exam  Her exam is as noted above.  There is an excellent thrill.  She has a palpable distal radial pulse.  The hand is warm and well-perfused.  The arm has reduced this edema by quite a bit.  The face and shoulder especially in the supraclavicular fossa is edematous.  She has no swallowing issues.  She has no breathing issues.    Assessment:  1. End stage renal disease (HCC)    2. Mechanical complication of arteriovenous fistula surgically created, subsequent encounter          Plan:  At this time I would like to see if this fistula even works before contemplating doing any further work with it.  We will go ahead and approve its use during dialysis.  If it works over the next month then I would continue to have her rely on collaterals and probably schedule

## 2025-06-03 NOTE — PATIENT INSTRUCTIONS
If you receive a survey asking about your care experience, please respond. Your answers will help ensure you receive high-quality care at this office. Thank you!    Your Medical Assistant today: Shilrey PHILLIPS  Thank you for coming to our office! It was a pleasure to serve you.

## 2025-07-25 ENCOUNTER — OFFICE VISIT (OUTPATIENT)
Age: 55
End: 2025-07-25
Payer: COMMERCIAL

## 2025-07-25 VITALS
HEART RATE: 84 BPM | SYSTOLIC BLOOD PRESSURE: 155 MMHG | DIASTOLIC BLOOD PRESSURE: 99 MMHG | HEIGHT: 67 IN | WEIGHT: 166.2 LBS | BODY MASS INDEX: 26.09 KG/M2

## 2025-07-25 DIAGNOSIS — T82.9XXD COMPLICATION OF ARTERIOVENOUS DIALYSIS FISTULA, SUBSEQUENT ENCOUNTER: Primary | ICD-10-CM

## 2025-07-25 PROCEDURE — 3017F COLORECTAL CA SCREEN DOC REV: CPT | Performed by: SURGERY

## 2025-07-25 PROCEDURE — 3077F SYST BP >= 140 MM HG: CPT | Performed by: SURGERY

## 2025-07-25 PROCEDURE — 3080F DIAST BP >= 90 MM HG: CPT | Performed by: SURGERY

## 2025-07-25 PROCEDURE — 99213 OFFICE O/P EST LOW 20 MIN: CPT | Performed by: SURGERY

## 2025-07-25 PROCEDURE — G8419 CALC BMI OUT NRM PARAM NOF/U: HCPCS | Performed by: SURGERY

## 2025-07-25 PROCEDURE — 4004F PT TOBACCO SCREEN RCVD TLK: CPT | Performed by: SURGERY

## 2025-07-25 PROCEDURE — G8428 CUR MEDS NOT DOCUMENT: HCPCS | Performed by: SURGERY

## 2025-07-25 NOTE — PROGRESS NOTES
[facility] [place] [facility]  [facility] VASCULAR SURGERY  [address], [address]  [place] [address]  Dept: [phone]  Loc: [phone]     Patient: Lucero Brown  : 1970  MRN: [id number]  DOS: 2025    Referring provider:  KORIN Castillo        HPI:  Lucero Brown is a 55 y.o. female who comes to the office for tunneled dialysis catheter removal.  She has a functional left upper arm AV dialysis fistula.      Past Medical History:   Diagnosis Date    Acute sinusitis 2020    Akathisia 2021    Anaphylactic shock, unspecified, initial encounter 2024    Aneurysm 2017    Anxiety     Arthropathy of right shoulder 10/19/2023    Cardiac tamponade 2025    Chronic kidney disease     Dental abscess 2021    right lower jaw/tooth    Diastolic congestive heart failure (HCC) 2025    Diverticulitis 2025    Dysphagia 2025    Eczema     all her life    GERD (gastroesophageal reflux disease)     History of pericardiectomy 2025    Hypertension     Hypertensive emergency 2021    Iron deficiency anemia, unspecified 2024    Mastoiditis     Mild protein-calorie malnutrition 2024    Myocardial infarction (HCC) 2025    Pericardial effusion 2025    Pneumonia 2025    Thyroid disease     having thyroid removed 13     Family History   Problem Relation Age of Onset    High Blood Pressure Mother     Cancer Mother     Other (aneurism) Mother     Heart Disease Father     Depression Father     Cancer Maternal Grandfather         N/a    Diabetes Paternal Grandfather     Heart Disease Paternal Grandfather       Social History     Socioeconomic History    Marital status: Single     Spouse name: Not on file    Number of children: 3    Years of education: 12    Highest education level: Not on file   Occupational History    Occupation: unemployed   Tobacco Use    Smoking status: Some Days     Current

## 2025-07-25 NOTE — ADDENDUM NOTE
Addended by: CYRIL SALGADO on: 7/25/2025 12:32 PM     Modules accepted: Orders, Level of Service    
Addended by: GEORGE DUBOIS on: 7/25/2025 12:37 PM     Modules accepted: Orders    
Addended by: GEORGE DUBOIS on: 7/25/2025 12:39 PM     Modules accepted: Orders    
Patient information on fall and injury prevention

## 2025-07-28 ENCOUNTER — HOSPITAL ENCOUNTER (OUTPATIENT)
Dept: INTERVENTIONAL RADIOLOGY/VASCULAR | Age: 55
Discharge: HOME | End: 2025-07-30
Attending: SURGERY

## 2025-07-28 DIAGNOSIS — T82.9XXD COMPLICATION OF ARTERIOVENOUS DIALYSIS FISTULA, SUBSEQUENT ENCOUNTER: ICD-10-CM

## 2025-07-28 PROCEDURE — 93990 DOPPLER FLOW TESTING: CPT

## 2025-07-29 ENCOUNTER — TELEPHONE (OUTPATIENT)
Age: 55
End: 2025-07-29

## 2025-07-29 DIAGNOSIS — T82.9XXD COMPLICATION OF ARTERIOVENOUS DIALYSIS FISTULA, SUBSEQUENT ENCOUNTER: Primary | ICD-10-CM

## 2025-07-29 NOTE — TELEPHONE ENCOUNTER
Patient had their vascular duplex hemodialysis access left yesterday. Can you please look over results and let us know how you would like to proceed? Does patient need to follow up? Thank you

## 2025-07-30 NOTE — TELEPHONE ENCOUNTER
Lucero Brown is scheduled for Left Upper Extremity Fistulogram with Dr Lowry on 8/4/25 at 10:00. Pt agreed to date/time.     Surgery instructions are as follows:  - Arrive to Same Day Surgery at [facility] at 8:30 am  - NPO after midnight the night before surgery  - No medication holds  - Will need  upon discharge     All instructions discussed with pt, she verbalized understanding. She denied questions or concerns at this time.     Primary insurance is Medicare. No prior authorization is necessary.

## 2025-08-04 ENCOUNTER — HOSPITAL ENCOUNTER (OUTPATIENT)
Dept: INTERVENTIONAL RADIOLOGY/VASCULAR | Age: 55
Discharge: HOME | End: 2025-08-04
Attending: SURGERY | Admitting: SURGERY
Payer: MEDICARE

## 2025-08-04 ENCOUNTER — APPOINTMENT (OUTPATIENT)
Dept: GENERAL RADIOLOGY | Age: 55
End: 2025-08-04
Attending: SURGERY
Payer: MEDICARE

## 2025-08-04 VITALS
HEART RATE: 75 BPM | SYSTOLIC BLOOD PRESSURE: 153 MMHG | RESPIRATION RATE: 11 BRPM | OXYGEN SATURATION: 98 % | BODY MASS INDEX: 26.63 KG/M2 | HEIGHT: 67 IN | TEMPERATURE: 97.8 F | DIASTOLIC BLOOD PRESSURE: 99 MMHG | WEIGHT: 169.7 LBS

## 2025-08-04 DIAGNOSIS — Z98.890 S/P ARTERIOVENOUS (AV) FISTULA CREATION: Primary | ICD-10-CM

## 2025-08-04 DIAGNOSIS — T82.9XXD COMPLICATION OF ARTERIOVENOUS DIALYSIS FISTULA, SUBSEQUENT ENCOUNTER: ICD-10-CM

## 2025-08-04 LAB
ANION GAP SERPL CALC-SCNC: 16 MEQ/L (ref 8–16)
BUN SERPL-MCNC: 33 MG/DL (ref 8–23)
CALCIUM SERPL-MCNC: 9.6 MG/DL (ref 8.6–10)
CHLORIDE SERPL-SCNC: 100 MEQ/L (ref 98–111)
CO2 SERPL-SCNC: 23 MEQ/L (ref 22–29)
CREAT SERPL-MCNC: 8.5 MG/DL (ref 0.5–0.9)
DEPRECATED RDW RBC AUTO: 50.6 FL (ref 35–45)
ERYTHROCYTE [DISTWIDTH] IN BLOOD BY AUTOMATED COUNT: 14.6 % (ref 11.5–14.5)
GFR SERPL CREATININE-BSD FRML MDRD: 5 ML/MIN/1.73M2
GLUCOSE SERPL-MCNC: 90 MG/DL (ref 74–109)
HCT VFR BLD AUTO: 32.6 % (ref 37–47)
HGB BLD-MCNC: 9.9 GM/DL (ref 12–16)
MCH RBC QN AUTO: 29.1 PG (ref 26–33)
MCHC RBC AUTO-ENTMCNC: 30.4 GM/DL (ref 32.2–35.5)
MCV RBC AUTO: 95.9 FL (ref 81–99)
PLATELET # BLD AUTO: 137 THOU/MM3 (ref 130–400)
PMV BLD AUTO: 10.9 FL (ref 9.4–12.4)
POTASSIUM SERPL-SCNC: 5.1 MEQ/L (ref 3.5–5.2)
RBC # BLD AUTO: 3.4 MILL/MM3 (ref 4.2–5.4)
SODIUM SERPL-SCNC: 139 MEQ/L (ref 135–145)
WBC # BLD AUTO: 4.5 THOU/MM3 (ref 4.8–10.8)

## 2025-08-04 PROCEDURE — 80048 BASIC METABOLIC PNL TOTAL CA: CPT

## 2025-08-04 PROCEDURE — 36901 INTRO CATH DIALYSIS CIRCUIT: CPT

## 2025-08-04 PROCEDURE — 6360000002 HC RX W HCPCS: Performed by: SURGERY

## 2025-08-04 PROCEDURE — 7100000010 HC PHASE II RECOVERY - FIRST 15 MIN: Performed by: SURGERY

## 2025-08-04 PROCEDURE — 36415 COLL VENOUS BLD VENIPUNCTURE: CPT

## 2025-08-04 PROCEDURE — C1769 GUIDE WIRE: HCPCS

## 2025-08-04 PROCEDURE — 2580000003 HC RX 258: Performed by: SURGERY

## 2025-08-04 PROCEDURE — 85027 COMPLETE CBC AUTOMATED: CPT

## 2025-08-04 PROCEDURE — 6370000000 HC RX 637 (ALT 250 FOR IP): Performed by: SURGERY

## 2025-08-04 PROCEDURE — 36907 BALO ANGIOP CTR DIALYSIS SEG: CPT

## 2025-08-04 PROCEDURE — 7100000011 HC PHASE II RECOVERY - ADDTL 15 MIN: Performed by: SURGERY

## 2025-08-04 PROCEDURE — 71045 X-RAY EXAM CHEST 1 VIEW: CPT

## 2025-08-04 PROCEDURE — 6360000004 HC RX CONTRAST MEDICATION: Performed by: SURGERY

## 2025-08-04 RX ORDER — SENNOSIDES 8.6 MG
325 CAPSULE ORAL ONCE
Status: COMPLETED | OUTPATIENT
Start: 2025-08-04 | End: 2025-08-04

## 2025-08-04 RX ORDER — SODIUM CHLORIDE 9 MG/ML
INJECTION, SOLUTION INTRAVENOUS PRN
Status: DISCONTINUED | OUTPATIENT
Start: 2025-08-04 | End: 2025-08-04 | Stop reason: HOSPADM

## 2025-08-04 RX ORDER — HYDRALAZINE HYDROCHLORIDE 20 MG/ML
20 INJECTION INTRAMUSCULAR; INTRAVENOUS EVERY 4 HOURS PRN
Status: DISCONTINUED | OUTPATIENT
Start: 2025-08-04 | End: 2025-08-04 | Stop reason: HOSPADM

## 2025-08-04 RX ORDER — ACETAMINOPHEN 325 MG/1
650 TABLET ORAL EVERY 6 HOURS SCHEDULED
Status: DISCONTINUED | OUTPATIENT
Start: 2025-08-04 | End: 2025-08-04 | Stop reason: HOSPADM

## 2025-08-04 RX ORDER — IOPAMIDOL 510 MG/ML
INJECTION, SOLUTION INTRAVASCULAR PRN
Status: COMPLETED | OUTPATIENT
Start: 2025-08-04 | End: 2025-08-04

## 2025-08-04 RX ORDER — HEPARIN SODIUM 1000 [USP'U]/ML
INJECTION, SOLUTION INTRAVENOUS; SUBCUTANEOUS PRN
Status: COMPLETED | OUTPATIENT
Start: 2025-08-04 | End: 2025-08-04

## 2025-08-04 RX ORDER — MIDAZOLAM HYDROCHLORIDE 1 MG/ML
INJECTION, SOLUTION INTRAMUSCULAR; INTRAVENOUS PRN
Status: COMPLETED | OUTPATIENT
Start: 2025-08-04 | End: 2025-08-04

## 2025-08-04 RX ORDER — SODIUM CHLORIDE 0.9 % (FLUSH) 0.9 %
5-40 SYRINGE (ML) INJECTION EVERY 12 HOURS SCHEDULED
Status: DISCONTINUED | OUTPATIENT
Start: 2025-08-04 | End: 2025-08-04 | Stop reason: HOSPADM

## 2025-08-04 RX ORDER — CLOPIDOGREL BISULFATE 75 MG/1
300 TABLET ORAL ONCE
Status: COMPLETED | OUTPATIENT
Start: 2025-08-04 | End: 2025-08-04

## 2025-08-04 RX ORDER — CLOPIDOGREL BISULFATE 75 MG/1
75 TABLET ORAL DAILY
Qty: 30 TABLET | Refills: 0 | Status: SHIPPED | OUTPATIENT
Start: 2025-08-04

## 2025-08-04 RX ORDER — FENTANYL CITRATE 50 UG/ML
INJECTION, SOLUTION INTRAMUSCULAR; INTRAVENOUS PRN
Status: COMPLETED | OUTPATIENT
Start: 2025-08-04 | End: 2025-08-04

## 2025-08-04 RX ORDER — LIDOCAINE HYDROCHLORIDE 20 MG/ML
INJECTION, SOLUTION EPIDURAL; INFILTRATION; INTRACAUDAL; PERINEURAL PRN
Status: COMPLETED | OUTPATIENT
Start: 2025-08-04 | End: 2025-08-04

## 2025-08-04 RX ORDER — SODIUM CHLORIDE 0.9 % (FLUSH) 0.9 %
5-40 SYRINGE (ML) INJECTION PRN
Status: DISCONTINUED | OUTPATIENT
Start: 2025-08-04 | End: 2025-08-04 | Stop reason: HOSPADM

## 2025-08-04 RX ORDER — LABETALOL HYDROCHLORIDE 5 MG/ML
20 INJECTION, SOLUTION INTRAVENOUS
Status: DISCONTINUED | OUTPATIENT
Start: 2025-08-04 | End: 2025-08-04 | Stop reason: HOSPADM

## 2025-08-04 RX ORDER — HYDROCODONE BITARTRATE AND ACETAMINOPHEN 5; 325 MG/1; MG/1
1 TABLET ORAL EVERY 4 HOURS PRN
Refills: 0 | Status: DISCONTINUED | OUTPATIENT
Start: 2025-08-04 | End: 2025-08-04 | Stop reason: HOSPADM

## 2025-08-04 RX ORDER — ASPIRIN 325 MG
81 TABLET ORAL DAILY
Qty: 30 TABLET | Refills: 5 | Status: SHIPPED | OUTPATIENT
Start: 2025-08-04

## 2025-08-04 RX ORDER — PROTAMINE SULFATE 10 MG/ML
INJECTION, SOLUTION INTRAVENOUS PRN
Status: COMPLETED | OUTPATIENT
Start: 2025-08-04 | End: 2025-08-04

## 2025-08-04 RX ADMIN — ASPIRIN 325 MG: 325 TABLET, COATED ORAL at 13:03

## 2025-08-04 RX ADMIN — HYDRALAZINE HYDROCHLORIDE 20 MG: 20 INJECTION INTRAMUSCULAR; INTRAVENOUS at 13:12

## 2025-08-04 RX ADMIN — FENTANYL CITRATE 25 MCG: 50 INJECTION, SOLUTION INTRAMUSCULAR; INTRAVENOUS at 11:43

## 2025-08-04 RX ADMIN — HEPARIN SODIUM 5000 UNITS: 1000 INJECTION INTRAVENOUS; SUBCUTANEOUS at 11:34

## 2025-08-04 RX ADMIN — ACETAMINOPHEN 650 MG: 325 TABLET ORAL at 13:03

## 2025-08-04 RX ADMIN — FENTANYL CITRATE 25 MCG: 50 INJECTION, SOLUTION INTRAMUSCULAR; INTRAVENOUS at 11:59

## 2025-08-04 RX ADMIN — IOPAMIDOL 40 ML: 510 INJECTION, SOLUTION INTRAVASCULAR at 12:17

## 2025-08-04 RX ADMIN — SODIUM CHLORIDE 1000 ML: 0.9 INJECTION, SOLUTION INTRAVENOUS at 09:01

## 2025-08-04 RX ADMIN — CLOPIDOGREL BISULFATE 300 MG: 75 TABLET, FILM COATED ORAL at 13:03

## 2025-08-04 RX ADMIN — FENTANYL CITRATE 25 MCG: 50 INJECTION, SOLUTION INTRAMUSCULAR; INTRAVENOUS at 11:48

## 2025-08-04 RX ADMIN — PROTAMINE SULFATE 20 MG: 10 INJECTION, SOLUTION INTRAVENOUS at 12:11

## 2025-08-04 RX ADMIN — LIDOCAINE HYDROCHLORIDE 5 ML: 20 INJECTION, SOLUTION EPIDURAL; INFILTRATION; INTRACAUDAL; PERINEURAL at 12:17

## 2025-08-04 RX ADMIN — MIDAZOLAM 1 MG: 1 INJECTION INTRAMUSCULAR; INTRAVENOUS at 11:28

## 2025-08-04 RX ADMIN — FENTANYL CITRATE 25 MCG: 50 INJECTION, SOLUTION INTRAMUSCULAR; INTRAVENOUS at 11:28

## 2025-08-04 RX ADMIN — MIDAZOLAM 1 MG: 1 INJECTION INTRAMUSCULAR; INTRAVENOUS at 11:46

## 2025-08-04 ASSESSMENT — PAIN DESCRIPTION - ORIENTATION: ORIENTATION: LEFT;MID

## 2025-08-04 ASSESSMENT — ENCOUNTER SYMPTOMS
RHINORRHEA: 0
ABDOMINAL PAIN: 0
CHEST TIGHTNESS: 0
SHORTNESS OF BREATH: 1

## 2025-08-04 ASSESSMENT — PAIN DESCRIPTION - LOCATION: LOCATION: CHEST

## 2025-08-04 ASSESSMENT — PAIN SCALES - GENERAL: PAINLEVEL_OUTOF10: 5

## 2025-09-02 ENCOUNTER — OFFICE VISIT (OUTPATIENT)
Age: 55
End: 2025-09-02
Payer: MEDICARE

## 2025-09-02 VITALS
SYSTOLIC BLOOD PRESSURE: 128 MMHG | DIASTOLIC BLOOD PRESSURE: 87 MMHG | HEIGHT: 67 IN | HEART RATE: 88 BPM | WEIGHT: 168.6 LBS | BODY MASS INDEX: 26.46 KG/M2

## 2025-09-02 DIAGNOSIS — Z98.890 S/P ARTERIOVENOUS (AV) FISTULA CREATION: Primary | ICD-10-CM

## 2025-09-02 DIAGNOSIS — N18.6 END STAGE KIDNEY DISEASE (HCC): ICD-10-CM

## 2025-09-02 PROCEDURE — 3079F DIAST BP 80-89 MM HG: CPT | Performed by: SURGERY

## 2025-09-02 PROCEDURE — 3017F COLORECTAL CA SCREEN DOC REV: CPT | Performed by: SURGERY

## 2025-09-02 PROCEDURE — G8419 CALC BMI OUT NRM PARAM NOF/U: HCPCS | Performed by: SURGERY

## 2025-09-02 PROCEDURE — 99213 OFFICE O/P EST LOW 20 MIN: CPT | Performed by: SURGERY

## 2025-09-02 PROCEDURE — G8427 DOCREV CUR MEDS BY ELIG CLIN: HCPCS | Performed by: SURGERY

## 2025-09-02 PROCEDURE — 4004F PT TOBACCO SCREEN RCVD TLK: CPT | Performed by: SURGERY

## 2025-09-02 PROCEDURE — 3074F SYST BP LT 130 MM HG: CPT | Performed by: SURGERY

## (undated) DEVICE — SUTURE PERMAHAND SZ 4-0 L18IN NONABSORBABLE BLK SILK BRAID A183H

## (undated) DEVICE — FORCEP RAD JAW W/NEEDLE 160CM

## (undated) DEVICE — CLIP LIG SM TI 6 BLU HNDL FOR OPN AND ENDOSCP SGL APPL

## (undated) DEVICE — BLADE OPHTH D5MM 15DEG GRN W/ RND KNURLED HNDL MICRO-SHARP

## (undated) DEVICE — TAPE UMBILICAL W1/16XL30IN COTTON ROUND NONRADIOPAQUE

## (undated) DEVICE — SET ADMIN 25ML L117IN PMP MOD CK VLV RLER CLMP 2 SMRTSITE

## (undated) DEVICE — CONMED SCOPE SAVER BITE BLOCK, 20X27 MM: Brand: SCOPE SAVER

## (undated) DEVICE — ENDO KIT: Brand: MEDLINE INDUSTRIES, INC.

## (undated) DEVICE — Device: Brand: DEFENDO VALVE AND CONNECTOR KIT

## (undated) DEVICE — DRESSING TRNSPAR W5XL4.5IN FLM SHT SEMIPERMEABLE WIND

## (undated) DEVICE — Device

## (undated) DEVICE — VASCULAR: Brand: MEDLINE INDUSTRIES, INC.

## (undated) DEVICE — PAD,NON-ADHERENT,3X8,STERILE,LF,1/PK: Brand: MEDLINE

## (undated) DEVICE — SHEET, T, LAPAROTOMY, STERILE: Brand: MEDLINE

## (undated) DEVICE — SYRINGE MED 10ML LUERLOCK TIP W/O SFTY DISP

## (undated) DEVICE — SUTURE NONABSORBABLE MONOFILAMENT 4-0 P-3 18 IN ETHILON 699H

## (undated) DEVICE — SUTURE VICRYL COATED PLU 4 0 UN SH TAPER POINT CIRCLE BRAIDED

## (undated) DEVICE — CLIP LIG M TI 6 SIL HNDL FOR OPN AND ENDOSCP SGL APPL

## (undated) DEVICE — HYPODERMIC SAFETY NEEDLE: Brand: MAGELLAN

## (undated) DEVICE — GLOVE SURG SZ 7.5 L11.73IN FNGR THK9.8MIL STRW LTX POLYMER

## (undated) DEVICE — DRESSING TRNSPAR W4XL10IN FLM MIC POR SURESITE 123

## (undated) DEVICE — SUTURE PERMA-HAND SZ 2-0 L30IN NONABSORBABLE BLK L26MM SH K833H

## (undated) DEVICE — DECANTER FLD 9IN ST BG FOR ASEP TRNSF OF FLD

## (undated) DEVICE — SPONGE GZ W4XL4IN COT 12 PLY TYP VII WVN C FLD DSGN STERILE

## (undated) DEVICE — SOLUTION IV 1000ML 0.45% SOD CHL PH 5 INJ USP VIAFLX PLAS